# Patient Record
Sex: FEMALE | Race: WHITE | Employment: OTHER | URBAN - METROPOLITAN AREA
[De-identification: names, ages, dates, MRNs, and addresses within clinical notes are randomized per-mention and may not be internally consistent; named-entity substitution may affect disease eponyms.]

---

## 2017-01-01 ENCOUNTER — RESULTS ONLY (OUTPATIENT)
Dept: LAB | Facility: CLINIC | Age: 82
End: 2017-01-01

## 2017-01-01 ENCOUNTER — INFUSION THERAPY VISIT (OUTPATIENT)
Dept: INFUSION THERAPY | Facility: CLINIC | Age: 82
End: 2017-01-01
Attending: INTERNAL MEDICINE
Payer: COMMERCIAL

## 2017-01-01 ENCOUNTER — TRANSFERRED RECORDS (OUTPATIENT)
Dept: HEALTH INFORMATION MANAGEMENT | Facility: CLINIC | Age: 82
End: 2017-01-01

## 2017-01-01 ENCOUNTER — HOSPITAL ENCOUNTER (OUTPATIENT)
Dept: LAB | Facility: CLINIC | Age: 82
Discharge: HOME | End: 2017-09-05
Attending: INTERNAL MEDICINE | Admitting: INTERNAL MEDICINE
Payer: COMMERCIAL

## 2017-01-01 ENCOUNTER — HOSPITAL ENCOUNTER (OUTPATIENT)
Dept: LAB | Facility: CLINIC | Age: 82
Discharge: HOME | End: 2017-12-11
Attending: INTERNAL MEDICINE | Admitting: INTERNAL MEDICINE
Payer: COMMERCIAL

## 2017-01-01 ENCOUNTER — HOSPITAL ENCOUNTER (OUTPATIENT)
Facility: CLINIC | Age: 82
Discharge: HOME | End: 2017-11-27
Attending: INTERNAL MEDICINE | Admitting: INTERNAL MEDICINE
Payer: COMMERCIAL

## 2017-01-01 ENCOUNTER — HOSPITAL ENCOUNTER (OUTPATIENT)
Dept: LAB | Facility: CLINIC | Age: 82
Discharge: HOME | End: 2017-04-03
Attending: INTERNAL MEDICINE | Admitting: INTERNAL MEDICINE
Payer: COMMERCIAL

## 2017-01-01 ENCOUNTER — HOSPITAL ENCOUNTER (OUTPATIENT)
Facility: CLINIC | Age: 82
Setting detail: SPECIMEN
Discharge: HOME | End: 2017-11-03
Attending: INTERNAL MEDICINE | Admitting: INTERNAL MEDICINE
Payer: COMMERCIAL

## 2017-01-01 ENCOUNTER — HOSPITAL ENCOUNTER (OUTPATIENT)
Dept: LAB | Facility: CLINIC | Age: 82
Discharge: HOME | End: 2017-08-21
Attending: INTERNAL MEDICINE | Admitting: INTERNAL MEDICINE
Payer: COMMERCIAL

## 2017-01-01 ENCOUNTER — HOSPITAL ENCOUNTER (OUTPATIENT)
Dept: LAB | Facility: CLINIC | Age: 82
Discharge: HOME | End: 2017-07-10
Attending: INTERNAL MEDICINE | Admitting: INTERNAL MEDICINE
Payer: COMMERCIAL

## 2017-01-01 ENCOUNTER — MEDICAL CORRESPONDENCE (OUTPATIENT)
Dept: HEALTH INFORMATION MANAGEMENT | Facility: CLINIC | Age: 82
End: 2017-01-01

## 2017-01-01 ENCOUNTER — HOSPITAL ENCOUNTER (OUTPATIENT)
Dept: LAB | Facility: CLINIC | Age: 82
Discharge: HOME | End: 2017-05-30
Attending: INTERNAL MEDICINE | Admitting: INTERNAL MEDICINE
Payer: COMMERCIAL

## 2017-01-01 ENCOUNTER — HOSPITAL ENCOUNTER (OUTPATIENT)
Dept: LAB | Facility: CLINIC | Age: 82
Discharge: HOME | End: 2017-10-02
Attending: INTERNAL MEDICINE | Admitting: INTERNAL MEDICINE
Payer: COMMERCIAL

## 2017-01-01 ENCOUNTER — HOSPITAL ENCOUNTER (OUTPATIENT)
Dept: LAB | Facility: CLINIC | Age: 82
Discharge: HOME | End: 2017-03-20
Attending: INTERNAL MEDICINE | Admitting: INTERNAL MEDICINE
Payer: COMMERCIAL

## 2017-01-01 ENCOUNTER — HOSPITAL ENCOUNTER (OUTPATIENT)
Dept: LAB | Facility: CLINIC | Age: 82
Discharge: HOME | End: 2017-05-15
Attending: INTERNAL MEDICINE | Admitting: INTERNAL MEDICINE
Payer: COMMERCIAL

## 2017-01-01 ENCOUNTER — HOSPITAL ENCOUNTER (OUTPATIENT)
Dept: LAB | Facility: CLINIC | Age: 82
Discharge: HOME | End: 2017-10-18
Attending: INTERNAL MEDICINE | Admitting: INTERNAL MEDICINE
Payer: COMMERCIAL

## 2017-01-01 ENCOUNTER — HOSPITAL ENCOUNTER (OUTPATIENT)
Dept: LAB | Facility: CLINIC | Age: 82
Discharge: HOME | End: 2017-11-27
Attending: INTERNAL MEDICINE | Admitting: INTERNAL MEDICINE
Payer: COMMERCIAL

## 2017-01-01 ENCOUNTER — HOSPITAL ENCOUNTER (OUTPATIENT)
Dept: LAB | Facility: CLINIC | Age: 82
Discharge: HOME | End: 2017-12-27
Attending: INTERNAL MEDICINE | Admitting: INTERNAL MEDICINE
Payer: COMMERCIAL

## 2017-01-01 ENCOUNTER — HOSPITAL ENCOUNTER (OUTPATIENT)
Dept: LAB | Facility: CLINIC | Age: 82
Discharge: HOME | End: 2017-05-01
Attending: INTERNAL MEDICINE | Admitting: INTERNAL MEDICINE
Payer: COMMERCIAL

## 2017-01-01 ENCOUNTER — HOSPITAL ENCOUNTER (OUTPATIENT)
Dept: LAB | Facility: CLINIC | Age: 82
Discharge: HOME | End: 2017-09-18
Attending: INTERNAL MEDICINE | Admitting: INTERNAL MEDICINE
Payer: COMMERCIAL

## 2017-01-01 ENCOUNTER — HOSPITAL ENCOUNTER (OUTPATIENT)
Dept: LAB | Facility: CLINIC | Age: 82
Discharge: HOME | End: 2017-08-07
Attending: INTERNAL MEDICINE | Admitting: INTERNAL MEDICINE
Payer: COMMERCIAL

## 2017-01-01 ENCOUNTER — HOSPITAL ENCOUNTER (OUTPATIENT)
Dept: LAB | Facility: CLINIC | Age: 82
Discharge: HOME | End: 2017-06-12
Attending: FAMILY MEDICINE | Admitting: INTERNAL MEDICINE
Payer: COMMERCIAL

## 2017-01-01 ENCOUNTER — HOSPITAL ENCOUNTER (OUTPATIENT)
Dept: LAB | Facility: CLINIC | Age: 82
Discharge: HOME | End: 2017-07-24
Attending: INTERNAL MEDICINE | Admitting: INTERNAL MEDICINE
Payer: COMMERCIAL

## 2017-01-01 ENCOUNTER — HOSPITAL ENCOUNTER (OUTPATIENT)
Dept: LAB | Facility: CLINIC | Age: 82
Discharge: HOME | End: 2017-06-26
Attending: INTERNAL MEDICINE | Admitting: INTERNAL MEDICINE
Payer: COMMERCIAL

## 2017-01-01 VITALS
OXYGEN SATURATION: 95 % | SYSTOLIC BLOOD PRESSURE: 133 MMHG | DIASTOLIC BLOOD PRESSURE: 60 MMHG | RESPIRATION RATE: 18 BRPM | TEMPERATURE: 97.7 F | HEART RATE: 92 BPM

## 2017-01-01 VITALS
RESPIRATION RATE: 18 BRPM | SYSTOLIC BLOOD PRESSURE: 136 MMHG | DIASTOLIC BLOOD PRESSURE: 74 MMHG | OXYGEN SATURATION: 96 % | TEMPERATURE: 98.1 F | HEART RATE: 90 BPM

## 2017-01-01 VITALS
SYSTOLIC BLOOD PRESSURE: 115 MMHG | DIASTOLIC BLOOD PRESSURE: 51 MMHG | TEMPERATURE: 97.8 F | OXYGEN SATURATION: 100 % | RESPIRATION RATE: 16 BRPM | HEART RATE: 80 BPM

## 2017-01-01 VITALS
SYSTOLIC BLOOD PRESSURE: 165 MMHG | TEMPERATURE: 97.5 F | DIASTOLIC BLOOD PRESSURE: 90 MMHG | RESPIRATION RATE: 16 BRPM | OXYGEN SATURATION: 97 % | HEART RATE: 92 BPM

## 2017-01-01 VITALS
DIASTOLIC BLOOD PRESSURE: 67 MMHG | OXYGEN SATURATION: 98 % | HEART RATE: 90 BPM | SYSTOLIC BLOOD PRESSURE: 127 MMHG | RESPIRATION RATE: 16 BRPM | TEMPERATURE: 97.6 F

## 2017-01-01 VITALS
RESPIRATION RATE: 16 BRPM | TEMPERATURE: 97.7 F | HEART RATE: 86 BPM | DIASTOLIC BLOOD PRESSURE: 55 MMHG | SYSTOLIC BLOOD PRESSURE: 128 MMHG

## 2017-01-01 VITALS
HEART RATE: 92 BPM | OXYGEN SATURATION: 95 % | SYSTOLIC BLOOD PRESSURE: 145 MMHG | RESPIRATION RATE: 16 BRPM | TEMPERATURE: 97.6 F | DIASTOLIC BLOOD PRESSURE: 76 MMHG

## 2017-01-01 VITALS
TEMPERATURE: 98.6 F | SYSTOLIC BLOOD PRESSURE: 171 MMHG | OXYGEN SATURATION: 93 % | BODY MASS INDEX: 27.88 KG/M2 | HEART RATE: 87 BPM | HEIGHT: 59 IN | RESPIRATION RATE: 16 BRPM | DIASTOLIC BLOOD PRESSURE: 60 MMHG | WEIGHT: 138.3 LBS

## 2017-01-01 VITALS
SYSTOLIC BLOOD PRESSURE: 118 MMHG | RESPIRATION RATE: 16 BRPM | TEMPERATURE: 97.7 F | OXYGEN SATURATION: 98 % | DIASTOLIC BLOOD PRESSURE: 56 MMHG | HEART RATE: 88 BPM

## 2017-01-01 VITALS
OXYGEN SATURATION: 92 % | HEART RATE: 92 BPM | DIASTOLIC BLOOD PRESSURE: 54 MMHG | RESPIRATION RATE: 18 BRPM | TEMPERATURE: 97.6 F | SYSTOLIC BLOOD PRESSURE: 110 MMHG

## 2017-01-01 VITALS
RESPIRATION RATE: 16 BRPM | DIASTOLIC BLOOD PRESSURE: 67 MMHG | TEMPERATURE: 98.2 F | OXYGEN SATURATION: 97 % | HEART RATE: 96 BPM | SYSTOLIC BLOOD PRESSURE: 131 MMHG

## 2017-01-01 VITALS
RESPIRATION RATE: 16 BRPM | TEMPERATURE: 97.8 F | DIASTOLIC BLOOD PRESSURE: 63 MMHG | HEART RATE: 99 BPM | SYSTOLIC BLOOD PRESSURE: 152 MMHG | OXYGEN SATURATION: 99 %

## 2017-01-01 VITALS
HEART RATE: 100 BPM | TEMPERATURE: 97.4 F | SYSTOLIC BLOOD PRESSURE: 157 MMHG | DIASTOLIC BLOOD PRESSURE: 75 MMHG | OXYGEN SATURATION: 99 % | RESPIRATION RATE: 16 BRPM

## 2017-01-01 VITALS
DIASTOLIC BLOOD PRESSURE: 58 MMHG | RESPIRATION RATE: 18 BRPM | TEMPERATURE: 97.4 F | SYSTOLIC BLOOD PRESSURE: 133 MMHG | HEART RATE: 86 BPM | OXYGEN SATURATION: 95 %

## 2017-01-01 VITALS
TEMPERATURE: 98.2 F | HEART RATE: 85 BPM | DIASTOLIC BLOOD PRESSURE: 61 MMHG | SYSTOLIC BLOOD PRESSURE: 123 MMHG | RESPIRATION RATE: 20 BRPM

## 2017-01-01 VITALS
DIASTOLIC BLOOD PRESSURE: 69 MMHG | TEMPERATURE: 98 F | OXYGEN SATURATION: 93 % | SYSTOLIC BLOOD PRESSURE: 145 MMHG | HEART RATE: 91 BPM

## 2017-01-01 VITALS
RESPIRATION RATE: 16 BRPM | OXYGEN SATURATION: 95 % | DIASTOLIC BLOOD PRESSURE: 82 MMHG | SYSTOLIC BLOOD PRESSURE: 131 MMHG | HEART RATE: 90 BPM | TEMPERATURE: 97.5 F

## 2017-01-01 VITALS — SYSTOLIC BLOOD PRESSURE: 124 MMHG | TEMPERATURE: 97.7 F | DIASTOLIC BLOOD PRESSURE: 65 MMHG | HEART RATE: 95 BPM

## 2017-01-01 VITALS
TEMPERATURE: 98.5 F | RESPIRATION RATE: 18 BRPM | DIASTOLIC BLOOD PRESSURE: 73 MMHG | SYSTOLIC BLOOD PRESSURE: 123 MMHG | HEART RATE: 96 BPM | OXYGEN SATURATION: 97 %

## 2017-01-01 DIAGNOSIS — D64.9 ANEMIA: ICD-10-CM

## 2017-01-01 DIAGNOSIS — D46.9 MDS (MYELODYSPLASTIC SYNDROME) (H): ICD-10-CM

## 2017-01-01 DIAGNOSIS — D46.9 MDS (MYELODYSPLASTIC SYNDROME) (H): Primary | ICD-10-CM

## 2017-01-01 LAB
ABO + RH BLD: NORMAL
BLD GP AB SCN SERPL QL: NORMAL
BLD PROD TYP BPU: NORMAL
BLD UNIT ID BPU: 0
BLOOD BANK CMNT PATIENT-IMP: NORMAL
BLOOD PRODUCT CODE: NORMAL
BPU ID: NORMAL
NUM BPU REQUESTED: 1
NUM BPU REQUESTED: 2
SPECIMEN EXP DATE BLD: NORMAL
TRANSFUSION STATUS PATIENT QL: NORMAL

## 2017-01-01 PROCEDURE — 96366 THER/PROPH/DIAG IV INF ADDON: CPT

## 2017-01-01 PROCEDURE — 25000128 H RX IP 250 OP 636: Performed by: INTERNAL MEDICINE

## 2017-01-01 PROCEDURE — 96365 THER/PROPH/DIAG IV INF INIT: CPT

## 2017-01-01 PROCEDURE — 86923 COMPATIBILITY TEST ELECTRIC: CPT | Performed by: INTERNAL MEDICINE

## 2017-01-01 PROCEDURE — P9016 RBC LEUKOCYTES REDUCED: HCPCS | Performed by: INTERNAL MEDICINE

## 2017-01-01 PROCEDURE — 86901 BLOOD TYPING SEROLOGIC RH(D): CPT | Performed by: INTERNAL MEDICINE

## 2017-01-01 PROCEDURE — 86850 RBC ANTIBODY SCREEN: CPT | Performed by: INTERNAL MEDICINE

## 2017-01-01 PROCEDURE — 36430 TRANSFUSION BLD/BLD COMPNT: CPT

## 2017-01-01 PROCEDURE — 86900 BLOOD TYPING SEROLOGIC ABO: CPT | Performed by: INTERNAL MEDICINE

## 2017-01-01 PROCEDURE — 36415 COLL VENOUS BLD VENIPUNCTURE: CPT | Performed by: INTERNAL MEDICINE

## 2017-01-01 PROCEDURE — 40000935 ZZH STATISTIC OUTPATIENT (NON-OBS) EVE

## 2017-01-01 PROCEDURE — 25000125 ZZHC RX 250: Performed by: INTERNAL MEDICINE

## 2017-01-01 PROCEDURE — 40000936 ZZH STATISTIC OUTPATIENT (NON-OBS) NIGHT

## 2017-01-01 PROCEDURE — 40000141 ZZH STATISTIC PERIPHERAL IV START W/O US GUIDANCE

## 2017-01-01 RX ADMIN — SODIUM CHLORIDE 1000 MG: 0.9 INJECTION, SOLUTION INTRAVENOUS at 12:49

## 2017-01-01 RX ADMIN — SODIUM CHLORIDE 1000 MG: 0.9 INJECTION, SOLUTION INTRAVENOUS at 13:12

## 2017-01-01 RX ADMIN — SODIUM CHLORIDE 2000 MG: 0.9 INJECTION, SOLUTION INTRAVENOUS at 12:29

## 2017-01-01 RX ADMIN — SODIUM CHLORIDE 2000 MG: 0.9 INJECTION, SOLUTION INTRAVENOUS at 09:20

## 2017-01-01 RX ADMIN — SODIUM CHLORIDE 1000 MG: 0.9 INJECTION, SOLUTION INTRAVENOUS at 08:32

## 2017-01-01 RX ADMIN — SODIUM CHLORIDE 1000 MG: 0.9 INJECTION, SOLUTION INTRAVENOUS at 10:53

## 2017-01-01 RX ADMIN — SODIUM CHLORIDE 2000 MG: 0.9 INJECTION, SOLUTION INTRAVENOUS at 11:29

## 2017-01-01 RX ADMIN — SODIUM CHLORIDE 2000 MG: 0.9 INJECTION, SOLUTION INTRAVENOUS at 09:09

## 2017-01-01 RX ADMIN — DEFEROXAMINE MESYLATE 1000 MG: 500 INJECTION, POWDER, LYOPHILIZED, FOR SOLUTION INTRAMUSCULAR; INTRAVENOUS; SUBCUTANEOUS at 18:22

## 2017-01-01 RX ADMIN — DEFEROXAMINE MESYLATE 1000 MG: 500 INJECTION, POWDER, LYOPHILIZED, FOR SOLUTION INTRAMUSCULAR; INTRAVENOUS; SUBCUTANEOUS at 09:36

## 2017-01-01 RX ADMIN — DEFEROXAMINE MESYLATE 1000 MG: 500 INJECTION, POWDER, LYOPHILIZED, FOR SOLUTION INTRAMUSCULAR; INTRAVENOUS; SUBCUTANEOUS at 12:37

## 2017-01-01 RX ADMIN — SODIUM CHLORIDE 1000 MG: 0.9 INJECTION, SOLUTION INTRAVENOUS at 13:07

## 2017-01-01 RX ADMIN — DEFEROXAMINE MESYLATE 1000 MG: 500 INJECTION, POWDER, LYOPHILIZED, FOR SOLUTION INTRAMUSCULAR; INTRAVENOUS; SUBCUTANEOUS at 12:00

## 2017-01-01 RX ADMIN — SODIUM CHLORIDE 1000 MG: 0.9 INJECTION, SOLUTION INTRAVENOUS at 10:16

## 2017-01-01 RX ADMIN — DEFEROXAMINE MESYLATE 2000 MG: 2 INJECTION, POWDER, LYOPHILIZED, FOR SOLUTION INTRAMUSCULAR; INTRAVENOUS; SUBCUTANEOUS at 12:33

## 2017-01-01 RX ADMIN — SODIUM CHLORIDE 2000 MG: 0.9 INJECTION, SOLUTION INTRAVENOUS at 11:07

## 2017-01-01 RX ADMIN — DEFEROXAMINE MESYLATE 2000 MG: 2 INJECTION, POWDER, LYOPHILIZED, FOR SOLUTION INTRAMUSCULAR; INTRAVENOUS; SUBCUTANEOUS at 11:45

## 2017-01-01 RX ADMIN — SODIUM CHLORIDE 2000 MG: 0.9 INJECTION, SOLUTION INTRAVENOUS at 12:51

## 2017-01-01 RX ADMIN — DEFEROXAMINE MESYLATE 1000 MG: 500 INJECTION, POWDER, LYOPHILIZED, FOR SOLUTION INTRAMUSCULAR; INTRAVENOUS; SUBCUTANEOUS at 08:30

## 2017-01-01 RX ADMIN — SODIUM CHLORIDE 2000 MG: 0.9 INJECTION, SOLUTION INTRAVENOUS at 09:29

## 2017-01-18 ENCOUNTER — HOSPITAL ENCOUNTER (OUTPATIENT)
Dept: LAB | Facility: CLINIC | Age: 82
Discharge: HOME | End: 2017-01-18
Attending: INTERNAL MEDICINE | Admitting: INTERNAL MEDICINE
Payer: COMMERCIAL

## 2017-01-18 ENCOUNTER — TRANSFERRED RECORDS (OUTPATIENT)
Dept: HEALTH INFORMATION MANAGEMENT | Facility: CLINIC | Age: 82
End: 2017-01-18

## 2017-01-18 DIAGNOSIS — D46.9 MDS (MYELODYSPLASTIC SYNDROME) (H): ICD-10-CM

## 2017-01-18 LAB
ABO + RH BLD: NORMAL
ABO + RH BLD: NORMAL
BLD GP AB SCN SERPL QL: NORMAL
BLOOD BANK CMNT PATIENT-IMP: NORMAL
NUM BPU REQUESTED: 2
SPECIMEN EXP DATE BLD: NORMAL

## 2017-01-18 PROCEDURE — 86923 COMPATIBILITY TEST ELECTRIC: CPT | Performed by: INTERNAL MEDICINE

## 2017-01-18 PROCEDURE — 36415 COLL VENOUS BLD VENIPUNCTURE: CPT | Performed by: INTERNAL MEDICINE

## 2017-01-18 PROCEDURE — 86850 RBC ANTIBODY SCREEN: CPT | Performed by: INTERNAL MEDICINE

## 2017-01-18 PROCEDURE — 86900 BLOOD TYPING SEROLOGIC ABO: CPT | Performed by: INTERNAL MEDICINE

## 2017-01-18 PROCEDURE — 86901 BLOOD TYPING SEROLOGIC RH(D): CPT | Performed by: INTERNAL MEDICINE

## 2017-01-20 ENCOUNTER — INFUSION THERAPY VISIT (OUTPATIENT)
Dept: INFUSION THERAPY | Facility: CLINIC | Age: 82
End: 2017-01-20
Attending: INTERNAL MEDICINE
Payer: COMMERCIAL

## 2017-01-20 VITALS
DIASTOLIC BLOOD PRESSURE: 85 MMHG | OXYGEN SATURATION: 95 % | SYSTOLIC BLOOD PRESSURE: 162 MMHG | HEART RATE: 96 BPM | RESPIRATION RATE: 16 BRPM | TEMPERATURE: 98.2 F

## 2017-01-20 DIAGNOSIS — D64.9 ANEMIA: ICD-10-CM

## 2017-01-20 DIAGNOSIS — D46.9 MDS (MYELODYSPLASTIC SYNDROME) (H): Primary | ICD-10-CM

## 2017-01-20 LAB
BLD PROD TYP BPU: NORMAL
BLD PROD TYP BPU: NORMAL
BLD UNIT ID BPU: 0
BLD UNIT ID BPU: 0
BLOOD PRODUCT CODE: NORMAL
BLOOD PRODUCT CODE: NORMAL
BPU ID: NORMAL
BPU ID: NORMAL
TRANSFUSION STATUS PATIENT QL: NORMAL

## 2017-01-20 PROCEDURE — 25000128 H RX IP 250 OP 636: Performed by: INTERNAL MEDICINE

## 2017-01-20 PROCEDURE — 25000125 ZZHC RX 250: Performed by: INTERNAL MEDICINE

## 2017-01-20 PROCEDURE — 36430 TRANSFUSION BLD/BLD COMPNT: CPT

## 2017-01-20 PROCEDURE — P9016 RBC LEUKOCYTES REDUCED: HCPCS | Performed by: INTERNAL MEDICINE

## 2017-01-20 PROCEDURE — 96366 THER/PROPH/DIAG IV INF ADDON: CPT

## 2017-01-20 PROCEDURE — 96365 THER/PROPH/DIAG IV INF INIT: CPT

## 2017-01-20 RX ADMIN — DEFEROXAMINE MESYLATE 1000 MG: 500 INJECTION, POWDER, LYOPHILIZED, FOR SOLUTION INTRAMUSCULAR; INTRAVENOUS; SUBCUTANEOUS at 11:16

## 2017-01-20 NOTE — PROGRESS NOTES
Infusion Nursing Note:  Josy Thomson presents today for 2 units of PRBC's and Desferal.    Patient seen by provider today: No   present during visit today: Not Applicable.    Note: Patient feeling well today.    Intravenous Access:  Peripheral IV placed- 2 placed, one line for the blood and one line for the Desferal.    Treatment Conditions:  Blood transfusion consent signed 10/14/16.  Hemoglobin 3.9.      Post Infusion Assessment:  Patient tolerated transfusion without incident.  Blood return noted pre and post infusion.  Site patent and intact, free from redness, edema or discomfort.  No evidence of extravasations.  Access discontinued per protocol.    Discharge Plan:   Discharge instructions reviewed with: Patient and daughter.  Patient and/or family verbalized understanding of discharge instructions and all questions answered.  Patient discharged in stable condition accompanied by: daughter.  Departure Mode: Wheelchair.    Yanni Masterson RN

## 2017-02-01 ENCOUNTER — TRANSFERRED RECORDS (OUTPATIENT)
Dept: HEALTH INFORMATION MANAGEMENT | Facility: CLINIC | Age: 82
End: 2017-02-01

## 2017-02-01 ENCOUNTER — HOSPITAL ENCOUNTER (OUTPATIENT)
Dept: LAB | Facility: CLINIC | Age: 82
Discharge: HOME | End: 2017-02-01
Attending: INTERNAL MEDICINE | Admitting: INTERNAL MEDICINE
Payer: COMMERCIAL

## 2017-02-01 ENCOUNTER — MEDICAL CORRESPONDENCE (OUTPATIENT)
Dept: HEALTH INFORMATION MANAGEMENT | Facility: CLINIC | Age: 82
End: 2017-02-01

## 2017-02-01 DIAGNOSIS — D46.9 MDS (MYELODYSPLASTIC SYNDROME) (H): ICD-10-CM

## 2017-02-01 DIAGNOSIS — D46.9 MDS (MYELODYSPLASTIC SYNDROME) (H): Primary | ICD-10-CM

## 2017-02-01 LAB
ABO + RH BLD: NORMAL
ABO + RH BLD: NORMAL
BLD GP AB SCN SERPL QL: NORMAL
BLD PROD TYP BPU: NORMAL
BLOOD BANK CMNT PATIENT-IMP: NORMAL
NUM BPU REQUESTED: 2
SPECIMEN EXP DATE BLD: NORMAL

## 2017-02-01 PROCEDURE — 86850 RBC ANTIBODY SCREEN: CPT | Performed by: INTERNAL MEDICINE

## 2017-02-01 PROCEDURE — 36415 COLL VENOUS BLD VENIPUNCTURE: CPT | Performed by: INTERNAL MEDICINE

## 2017-02-01 PROCEDURE — 86923 COMPATIBILITY TEST ELECTRIC: CPT | Performed by: INTERNAL MEDICINE

## 2017-02-01 PROCEDURE — 86901 BLOOD TYPING SEROLOGIC RH(D): CPT | Performed by: INTERNAL MEDICINE

## 2017-02-01 PROCEDURE — 86900 BLOOD TYPING SEROLOGIC ABO: CPT | Performed by: INTERNAL MEDICINE

## 2017-02-03 ENCOUNTER — INFUSION THERAPY VISIT (OUTPATIENT)
Dept: INFUSION THERAPY | Facility: CLINIC | Age: 82
End: 2017-02-03
Attending: PEDIATRICS
Payer: COMMERCIAL

## 2017-02-03 VITALS
OXYGEN SATURATION: 98 % | RESPIRATION RATE: 18 BRPM | HEART RATE: 93 BPM | SYSTOLIC BLOOD PRESSURE: 115 MMHG | TEMPERATURE: 97.9 F | DIASTOLIC BLOOD PRESSURE: 47 MMHG

## 2017-02-03 DIAGNOSIS — D46.9 MDS (MYELODYSPLASTIC SYNDROME) (H): Primary | ICD-10-CM

## 2017-02-03 DIAGNOSIS — D64.9 ANEMIA: ICD-10-CM

## 2017-02-03 PROCEDURE — 96365 THER/PROPH/DIAG IV INF INIT: CPT

## 2017-02-03 PROCEDURE — P9016 RBC LEUKOCYTES REDUCED: HCPCS | Performed by: INTERNAL MEDICINE

## 2017-02-03 PROCEDURE — 96366 THER/PROPH/DIAG IV INF ADDON: CPT

## 2017-02-03 PROCEDURE — 25000128 H RX IP 250 OP 636: Performed by: INTERNAL MEDICINE

## 2017-02-03 PROCEDURE — 36430 TRANSFUSION BLD/BLD COMPNT: CPT

## 2017-02-03 RX ADMIN — DEFEROXAMINE MESYLATE 1000 MG: 500 INJECTION, POWDER, LYOPHILIZED, FOR SOLUTION INTRAMUSCULAR; INTRAVENOUS; SUBCUTANEOUS at 08:41

## 2017-02-03 NOTE — PROGRESS NOTES
Infusion Nursing Note:  Josy Thomson presents today for 2 unit's PRBC's with Desferal.    Patient seen by provider today: No   present during visit today: Not Applicable.    Note: N/A.    Intravenous Access:  Peripheral IV placed x's 2.    Treatment Conditions:  HGB    4.7   Blood transfusion consent signed 10/14/2016.      Post Infusion Assessment:  Patient tolerated infusion without incident.  Blood return noted pre and post infusion.  Site patent and intact, free from redness, edema or discomfort.  No evidence of extravasations.  Access discontinued per protocol.    Discharge Plan:   Discharge instructions reviewed with: Patient and Family.  Patient and/or family verbalized understanding of discharge instructions and all questions answered.  AVS to patient via [employer].  Patient will return as needed for next appointment.   Patient discharged in stable condition accompanied by: daughter.  Departure Mode: Wheelchair.    Mili Dickerson RN

## 2017-02-27 ENCOUNTER — TRANSFERRED RECORDS (OUTPATIENT)
Dept: HEALTH INFORMATION MANAGEMENT | Facility: CLINIC | Age: 82
End: 2017-02-27

## 2017-03-01 ENCOUNTER — HOSPITAL ENCOUNTER (OUTPATIENT)
Dept: LAB | Facility: CLINIC | Age: 82
Discharge: HOME | End: 2017-03-01
Attending: INTERNAL MEDICINE | Admitting: INTERNAL MEDICINE
Payer: COMMERCIAL

## 2017-03-01 DIAGNOSIS — D46.9 MDS (MYELODYSPLASTIC SYNDROME) (H): ICD-10-CM

## 2017-03-01 PROCEDURE — 36415 COLL VENOUS BLD VENIPUNCTURE: CPT | Performed by: INTERNAL MEDICINE

## 2017-03-01 PROCEDURE — 86901 BLOOD TYPING SEROLOGIC RH(D): CPT | Performed by: INTERNAL MEDICINE

## 2017-03-01 PROCEDURE — 86923 COMPATIBILITY TEST ELECTRIC: CPT | Performed by: INTERNAL MEDICINE

## 2017-03-01 PROCEDURE — 86850 RBC ANTIBODY SCREEN: CPT | Performed by: INTERNAL MEDICINE

## 2017-03-01 PROCEDURE — 86900 BLOOD TYPING SEROLOGIC ABO: CPT | Performed by: INTERNAL MEDICINE

## 2017-03-03 ENCOUNTER — INFUSION THERAPY VISIT (OUTPATIENT)
Dept: INFUSION THERAPY | Facility: CLINIC | Age: 82
End: 2017-03-03
Attending: INTERNAL MEDICINE
Payer: COMMERCIAL

## 2017-03-03 VITALS
OXYGEN SATURATION: 98 % | HEART RATE: 102 BPM | TEMPERATURE: 98.2 F | RESPIRATION RATE: 18 BRPM | SYSTOLIC BLOOD PRESSURE: 147 MMHG | DIASTOLIC BLOOD PRESSURE: 68 MMHG

## 2017-03-03 DIAGNOSIS — D64.9 ANEMIA: ICD-10-CM

## 2017-03-03 DIAGNOSIS — D46.9 MDS (MYELODYSPLASTIC SYNDROME) (H): Primary | ICD-10-CM

## 2017-03-03 PROCEDURE — 96365 THER/PROPH/DIAG IV INF INIT: CPT

## 2017-03-03 PROCEDURE — P9016 RBC LEUKOCYTES REDUCED: HCPCS | Performed by: INTERNAL MEDICINE

## 2017-03-03 PROCEDURE — 25000128 H RX IP 250 OP 636: Performed by: INTERNAL MEDICINE

## 2017-03-03 PROCEDURE — 36430 TRANSFUSION BLD/BLD COMPNT: CPT

## 2017-03-03 PROCEDURE — 96366 THER/PROPH/DIAG IV INF ADDON: CPT

## 2017-03-03 RX ADMIN — DEFEROXAMINE MESYLATE 1000 MG: 500 INJECTION, POWDER, LYOPHILIZED, FOR SOLUTION INTRAMUSCULAR; INTRAVENOUS; SUBCUTANEOUS at 08:26

## 2017-03-03 NOTE — PROGRESS NOTES
Infusion Nursing Note:  Josy Thomson presents today for 2 units of PRBC's and Desferal.    Patient seen by provider today: No   present during visit today: Not Applicable.    Note: N/A.    Intravenous Access:  Peripheral IV placed- 2 placed, one line for the blood and one line for the Desferal.    Treatment Conditions:  Blood transfusion consent signed 10/14/16.      Post Infusion Assessment:  Patient tolerated transfusion without incident.  Blood return noted pre and post infusion.  Site patent and intact, free from redness, edema or discomfort.  No evidence of extravasations.  Access discontinued per protocol.    Discharge Plan:   Discharge instructions reviewed with: Patient.  Patient and/or family verbalized understanding of discharge instructions and all questions answered.  Patient discharged in stable condition accompanied by: daughter.  Departure Mode: Wheelchair.    Yanni Masterson RN

## 2017-03-03 NOTE — MR AVS SNAPSHOT
"              After Visit Summary   3/3/2017    Josy Thomson    MRN: [id number]           Patient Information     Date Of Birth          12/12/1927        Visit Information        Provider Department      3/3/2017 8:00 AM RH INFUSION CHAIR 3 [facility] Infusion Services        Today's Diagnoses     MDS (myelodysplastic syndrome) (H)    -  1    Anemia           Follow-ups after your visit        Future tests that were ordered for you today     Open Standing Orders        Priority Remaining Interval Expires Ordered    Red blood cell prepare order unit Routine 99/100 CONDITIONAL (SPECIFY) BLOOD  2/28/2017            Who to contact     If you have questions or need follow up information about today's clinic visit or your schedule please contact [facility] INFUSION SERVICES directly at [phone].  Normal or non-critical lab and imaging results will be communicated to you by SumZerohart, letter or phone within 4 business days after the clinic has received the results. If you do not hear from us within 7 days, please contact the clinic through PublikDemandt or phone. If you have a critical or abnormal lab result, we will notify you by phone as soon as possible.  Submit refill requests through Yushino or call your pharmacy and they will forward the refill request to us. Please allow 3 business days for your refill to be completed.          Additional Information About Your Visit        SumZerohart Information     Yushino lets you send messages to your doctor, view your test results, renew your prescriptions, schedule appointments and more. To sign up, go to [email or web address] . Click on \"Log in\" on the left side of the screen, which will take you to the Welcome page. Then click on \"Sign up Now\" on the right side of the page.     You will be asked to enter the access code listed below, as well as some personal information. Please follow the directions to create your username and password.   "   Your access code is: [email or web address]  Expires: 2017  8:34 AM     Your access code will  in 90 days. If you need help or a new code, please call your [place] clinic or [phone].        Care EveryWhere ID     This is your Care EveryWhere ID. This could be used by other organizations to access your [place] medical records  [id number]        Your Vitals Were     Pulse Temperature Respirations Pulse Oximetry          102 98.2  F (36.8  C) (Tympanic) 18 98%         Blood Pressure from Last 3 Encounters:   17 147/68   17 115/47   17 162/85    Weight from Last 3 Encounters:   11/17/15 64.9 kg (143 lb)   10/12/15 63.5 kg (140 lb)   10/27/12 77.1 kg (170 lb)              We Performed the Following     Obtain affirmation of informed consent     Red blood cell prepare order unit     Transfuse red blood cell unit     Transfuse red blood cell unit     Treatment Conditions        Primary Care Provider Office Phone # Fax #    Abeba Bradford [phone]       [facility] [address] [place] [address]        Thank you!     Thank you for choosing [facility] INFUSION SERVICES  for your care. Our goal is always to provide you with excellent care. Hearing back from our patients is one way we can continue to improve our services. Please take a few minutes to complete the written survey that you may receive in the mail after your visit with us. Thank you!             Your Updated Medication List - Protect others around you: Learn how to safely use, store and throw away your medicines at www.disposemymeds.org.          This list is accurate as of: 3/3/17 12:02 PM.  Always use your most recent med list.                   Brand Name Dispense Instructions for use    acetaminophen 325 MG tablet    TYLENOL    250 tablet    Take 2 tablets by mouth every 4 hours as needed.       B COMPLEX 1 PO      Take by mouth daily       calcium carb 1250 mg (500 mg  jourdan)/vitamin D 200 units 500-200 MG-UNIT per tablet    OSCAL with D     Take 1 tablet by mouth 2 times daily (with meals).       cyanocobalamin 1000 MCG/ML injection    VITAMIN B12     Inject 1 mL into the muscle every 30 days       DARBEPOETIN KEVIN-POLYSORBATE IJ          EFFEXOR XR 75 MG 24 hr capsule   Generic drug:  venlafaxine      Take 75 mg by mouth daily.       EXJADE PO      Take 1,500 mg by mouth every morning (before breakfast)       HYDROMORPHONE HCL PO          losartan-hydrochlorothiazide 100-25 MG per tablet    HYZAAR     Take 1 tablet by mouth daily.       metoprolol 100 MG 24 hr tablet    TOPROL-XL     Take 100 mg by mouth daily.       omeprazole 20 MG CR capsule    priLOSEC     Take 40 mg by mouth daily       VITAMIN MIXTURE PO      Take 2 tablets by mouth daily Occuvite for eye health

## 2017-03-22 NOTE — MR AVS SNAPSHOT
"              After Visit Summary   3/22/2017    Josy Thomson    MRN: [id number]           Patient Information     Date Of Birth          12/12/1927        Visit Information        Provider Department      3/22/2017 9:00 AM RH INFUSION CHAIR 7 [facility] Infusion Services        Today's Diagnoses     MDS (myelodysplastic syndrome) (H)    -  1       Follow-ups after your visit        Future tests that were ordered for you today     Open Standing Orders        Priority Remaining Interval Expires Ordered    Red blood cell prepare order unit Routine 99/100 CONDITIONAL (SPECIFY) BLOOD  3/20/2017            Who to contact     If you have questions or need follow up information about today's clinic visit or your schedule please contact [facility] INFUSION SERVICES directly at [phone].  Normal or non-critical lab and imaging results will be communicated to you by Movilehart, letter or phone within 4 business days after the clinic has received the results. If you do not hear from us within 7 days, please contact the clinic through [employer] or phone. If you have a critical or abnormal lab result, we will notify you by phone as soon as possible.  Submit refill requests through [employer] or call your pharmacy and they will forward the refill request to us. Please allow 3 business days for your refill to be completed.          Additional Information About Your Visit        Movilehart Information     General [employer] lets you send messages to your doctor, view your test results, renew your prescriptions, schedule appointments and more. To sign up, go to [email or web address] . Click on \"Log in\" on the left side of the screen, which will take you to the Welcome page. Then click on \"Sign up Now\" on the right side of the page.     You will be asked to enter the access code listed below, as well as some personal information. Please follow the directions to create your username and password.     Your access " code is: [email or web address]  Expires: 2017  9:34 AM     Your access code will  in 90 days. If you need help or a new code, please call your [place] clinic or [phone].        Care EveryWhere ID     This is your Care EveryWhere ID. This could be used by other organizations to access your [place] medical records  [id number]        Your Vitals Were     Pulse Temperature Respirations Pulse Oximetry          92 97.5  F (36.4  C) (Tympanic) 16 97%         Blood Pressure from Last 3 Encounters:   17 165/90   17 147/68   17 115/47    Weight from Last 3 Encounters:   11/17/15 64.9 kg (143 lb)   10/12/15 63.5 kg (140 lb)   10/27/12 77.1 kg (170 lb)              We Performed the Following     Obtain affirmation of informed consent     Red blood cell prepare order unit     Transfuse red blood cell unit     Transfuse red blood cell unit     Treatment Conditions        Primary Care Provider Office Phone # Fax #    Abeba Meron Bradford [phone]       [facility] [address] [place] [address]        Thank you!     Thank you for choosing [facility] INFUSION SERVICES  for your care. Our goal is always to provide you with excellent care. Hearing back from our patients is one way we can continue to improve our services. Please take a few minutes to complete the written survey that you may receive in the mail after your visit with us. Thank you!             Your Updated Medication List - Protect others around you: Learn how to safely use, store and throw away your medicines at www.disposemymeds.org.          This list is accurate as of: 3/22/17  1:19 PM.  Always use your most recent med list.                   Brand Name Dispense Instructions for use    acetaminophen 325 MG tablet    TYLENOL    250 tablet    Take 2 tablets by mouth every 4 hours as needed.       B COMPLEX 1 PO      Take by mouth daily       calcium carb 1250 mg (500 mg [place])/vitamin D 200  units 500-200 MG-UNIT per tablet    OSCAL with D     Take 1 tablet by mouth 2 times daily (with meals).       cyanocobalamin 1000 MCG/ML injection    VITAMIN B12     Inject 1 mL into the muscle every 30 days       DARBEPOETIN KEVIN-POLYSORBATE IJ          EFFEXOR XR 75 MG 24 hr capsule   Generic drug:  venlafaxine      Take 75 mg by mouth daily.       EXJADE PO      Take 1,500 mg by mouth every morning (before breakfast)       HYDROMORPHONE HCL PO          losartan-hydrochlorothiazide 100-25 MG per tablet    HYZAAR     Take 1 tablet by mouth daily.       metoprolol 100 MG 24 hr tablet    TOPROL-XL     Take 100 mg by mouth daily.       omeprazole 20 MG CR capsule    priLOSEC     Take 40 mg by mouth daily       VITAMIN MIXTURE PO      Take 2 tablets by mouth daily Occuvite for eye health

## 2017-03-22 NOTE — PROGRESS NOTES
Infusion Nursing Note:  Josy Thomson presents today for 2 units prbc with desferal.    Patient seen by provider today: No   present during visit today: Not Applicable.    Note: N/A.    Intravenous Access:  Peripheral IV placed.    Treatment Conditions:  Blood transfusion consent signed 10/12/16.  hgb  4.4      Post Infusion Assessment:  Patient tolerated infusion without incident.  Site patent and intact, free from redness, edema or discomfort.  No evidence of extravasations.  Access discontinued per protocol.    Discharge Plan:   Patient and/or family verbalized understanding of discharge instructions and all questions answered.  Patient discharged in stable condition accompanied by: self and daughter.  Departure Mode: Wheelchair.    Donna Mccullough RN

## 2017-04-04 NOTE — PROGRESS NOTES
Infusion Nursing Note:  Josy Thomson presents today for 2 units PRBC and desferal.    Patient seen by provider today: No   present during visit today: Not Applicable.    Note: N/A.    Intravenous Access:  2 Peripheral IV placed.    Treatment Conditions:  Blood transfusion consent signed 10/14/16.      Post Infusion Assessment:  Patient tolerated infusion without incident.  Site patent and intact, free from redness, edema or discomfort.  No evidence of extravasations.  Access discontinued per protocol.    Discharge Plan:   Patient declined prescription refills.  Discharge instructions reviewed with: Patient.  Patient and/or family verbalized understanding of discharge instructions and all questions answered.  Copy of AVS reviewed with patient and/or family.   Patient discharged in stable condition accompanied by: daughter.  Departure Mode: Wheelchair.    Kadi Sears RN

## 2017-04-04 NOTE — MR AVS SNAPSHOT
"              After Visit Summary   4/4/2017    Josy Thomson    MRN: [id number]           Patient Information     Date Of Birth          12/12/1927        Visit Information        Provider Department      4/4/2017 8:00 AM RH INFUSION CHAIR 9 [facility] Infusion Services        Today's Diagnoses     MDS (myelodysplastic syndrome) (H)    -  1    Anemia           Follow-ups after your visit        Future tests that were ordered for you today     Open Standing Orders        Priority Remaining Interval Expires Ordered    Red blood cell prepare order unit Routine 99/100 CONDITIONAL (SPECIFY) BLOOD  4/3/2017            Who to contact     If you have questions or need follow up information about today's clinic visit or your schedule please contact [facility] INFUSION SERVICES directly at [phone].  Normal or non-critical lab and imaging results will be communicated to you by MK2Mediahart, letter or phone within 4 business days after the clinic has received the results. If you do not hear from us within 7 days, please contact the clinic through LoraxAgt or phone. If you have a critical or abnormal lab result, we will notify you by phone as soon as possible.  Submit refill requests through T[employer] or call your pharmacy and they will forward the refill request to us. Please allow 3 business days for your refill to be completed.          Additional Information About Your Visit        MK2Mediahart Information     [employer] lets you send messages to your doctor, view your test results, renew your prescriptions, schedule appointments and more. To sign up, go to [email or web address] . Click on \"Log in\" on the left side of the screen, which will take you to the Welcome page. Then click on \"Sign up Now\" on the right side of the page.     You will be asked to enter the access code listed below, as well as some personal information. Please follow the directions to create your username and password.   "   Your access code is: [email or web address]  Expires: 2017  9:34 AM     Your access code will  in 90 days. If you need help or a new code, please call your [place] clinic or [phone].        Care EveryWhere ID     This is your Care EveryWhere ID. This could be used by other organizations to access your [place] medical records  [id number]        Your Vitals Were     Pulse Temperature Respirations Pulse Oximetry          92 97.6  F (36.4  C) (Tympanic) 16 95%         Blood Pressure from Last 3 Encounters:   17 145/76   17 165/90   17 147/68    Weight from Last 3 Encounters:   11/17/15 64.9 kg (143 lb)   10/12/15 63.5 kg (140 lb)   10/27/12 77.1 kg (170 lb)              We Performed the Following     Obtain affirmation of informed consent     Red blood cell prepare order unit     Transfuse red blood cell unit     Transfuse red blood cell unit     Treatment Conditions        Primary Care Provider Office Phone # Fax #    Abeba Bradford [phone]       [facility] [address] [place] [address]        Thank you!     Thank you for choosing [facility] INFUSION SERVICES  for your care. Our goal is always to provide you with excellent care. Hearing back from our patients is one way we can continue to improve our services. Please take a few minutes to complete the written survey that you may receive in the mail after your visit with us. Thank you!             Your Updated Medication List - Protect others around you: Learn how to safely use, store and throw away your medicines at www.disposemymeds.org.          This list is accurate as of: 17 11:30 AM.  Always use your most recent med list.                   Brand Name Dispense Instructions for use    acetaminophen 325 MG tablet    TYLENOL    250 tablet    Take 2 tablets by mouth every 4 hours as needed.       B COMPLEX 1 PO      Take by mouth daily       calcium carb 1250 mg (500 mg  jourdan)/vitamin D 200 units 500-200 MG-UNIT per tablet    OSCAL with D     Take 1 tablet by mouth 2 times daily (with meals).       cyanocobalamin 1000 MCG/ML injection    VITAMIN B12     Inject 1 mL into the muscle every 30 days       DARBEPOETIN KEVIN-POLYSORBATE IJ          EFFEXOR XR 75 MG 24 hr capsule   Generic drug:  venlafaxine      Take 75 mg by mouth daily.       EXJADE PO      Take 1,500 mg by mouth every morning (before breakfast)       HYDROMORPHONE HCL PO          losartan-hydrochlorothiazide 100-25 MG per tablet    HYZAAR     Take 1 tablet by mouth daily.       metoprolol 100 MG 24 hr tablet    TOPROL-XL     Take 100 mg by mouth daily.       omeprazole 20 MG CR capsule    priLOSEC     Take 40 mg by mouth daily       VITAMIN MIXTURE PO      Take 2 tablets by mouth daily Occuvite for eye health

## 2017-05-02 NOTE — MR AVS SNAPSHOT
"              After Visit Summary   5/2/2017    Josy Thomson    MRN: [id number]           Patient Information     Date Of Birth          12/12/1927        Visit Information        Provider Department      5/2/2017 12:00 PM RH INFUSION CHAIR 1 [facility]        Today's Diagnoses     MDS (myelodysplastic syndrome) (H)    -  1    Anemia           Follow-ups after your visit        Future tests that were ordered for you today     Open Standing Orders        Priority Remaining Interval Expires Ordered    ABO/Rh type and screen Routine 24/25 prn 5/1/2018 5/1/2017    Red blood cell prepare order unit Routine 99/100 CONDITIONAL (SPECIFY) BLOOD  5/1/2017            Who to contact     If you have questions or need follow up information about today's clinic visit or your schedule please contact [facility] INF[facility] directly at [phone].  Normal or non-critical lab and imaging results will be communicated to you by iCeuticahart, letter or phone within 4 business days after the clinic has received the results. If you do not hear from us within 7 days, please contact the clinic through Navdyt or phone. If you have a critical or abnormal lab result, we will notify you by phone as soon as possible.  Submit refill requests through Encite or call your pharmacy and they will forward the refill request to us. Please allow 3 business days for your refill to be completed.          Additional Information About Your Visit        MyChart Information     Encite lets you send messages to your doctor, view your test results, renew your prescriptions, schedule appointments and more. To sign up, go to [email or web address] . Click on \"Log in\" on the left side of the screen, which will take you to the Welcome page. Then click on \"Sign up Now\" on the right side of the page.     You will be asked to enter the access code listed below, as well as some personal information. Please " follow the directions to create your username and password.     Your access code is: [email or web address]  Expires: 2017  9:34 AM     Your access code will  in 90 days. If you need help or a new code, please call your [place] clinic or [phone].        Care EveryWhere ID     This is your Care EveryWhere ID. This could be used by other organizations to access your [place] medical records  [id number]        Your Vitals Were     Pulse Temperature Respirations Pulse Oximetry          96 98.5  F (36.9  C) (Tympanic) 18 97%         Blood Pressure from Last 3 Encounters:   17 123/73   17 145/76   17 165/90    Weight from Last 3 Encounters:   11/17/15 64.9 kg (143 lb)   10/12/15 63.5 kg (140 lb)   10/27/12 77.1 kg (170 lb)              We Performed the Following     Obtain affirmation of informed consent     Red blood cell prepare order unit     Transfuse red blood cell unit     Transfuse red blood cell unit     Treatment Conditions          Today's Medication Changes          These changes are accurate as of: 17  4:11 PM.  If you have any questions, ask your nurse or doctor.               Stop taking these medicines if you haven't already. Please contact your care team if you have questions.     JOSS VALENZUELA                    Primary Care Provider Office Phone # Fax #    Abeba Meron Bradford [phone]       [facility] [address] [place] [address]        Thank you!     Thank you for choosing [facility] INFUSION SERVICES  for your care. Our goal is always to provide you with excellent care. Hearing back from our patients is one way we can continue to improve our services. Please take a few minutes to complete the written survey that you may receive in the mail after your visit with us. Thank you!             Your Updated Medication List - Protect others around you: Learn how to safely use, store and throw away your medicines at  www.disposemymeds.org.          This list is accurate as of: 5/2/17  4:11 PM.  Always use your most recent med list.                   Brand Name Dispense Instructions for use    acetaminophen 325 MG tablet    TYLENOL    250 tablet    Take 2 tablets by mouth every 4 hours as needed.       B COMPLEX 1 PO      Take by mouth daily       calcium carb 1250 mg (500 mg [place])/vitamin D 200 units 500-200 MG-UNIT per tablet    OSCAL with D     Take 1 tablet by mouth 2 times daily (with meals).       cyanocobalamin 1000 MCG/ML injection    VITAMIN B12     Inject 1 mL into the muscle every 30 days       DARBEPOETIN KEVIN-POLYSORBATE IJ          deferoxamine      Inject 1,000 mg into the vein once With blood transfusions       EFFEXOR XR 75 MG 24 hr capsule   Generic drug:  venlafaxine      Take 75 mg by mouth daily.       HYDROMORPHONE HCL PO          losartan-hydrochlorothiazide 100-25 MG per tablet    HYZAAR     Take 1 tablet by mouth daily.       metoprolol 100 MG 24 hr tablet    TOPROL-XL     Take 100 mg by mouth daily.       omeprazole 20 MG CR capsule    priLOSEC     Take 40 mg by mouth daily       VITAMIN MIXTURE PO      Take 2 tablets by mouth daily Occuvite for eye health

## 2017-05-02 NOTE — PROGRESS NOTES
Infusion Nursing Note:  Josy Thomson presents today for 2 units PRBC with desferral.    Patient seen by provider today: No   present during visit today: Not Applicable.    Note: HGB 4.0    Intravenous Access:  Peripheral IVs placed.    Treatment Conditions:  Blood transfusion consent signed 10/12/16.  Had desferral 1000mg over time of infusion with different site    Post Infusion Assessment:  Patient tolerated infusion without incident.  Blood return noted pre and post infusion.  Site patent and intact, free from redness, edema or discomfort.  Access discontinued per protocol.    Discharge Plan:   Discharge instructions reviewed with: Patient and Family.  Patient and/or family verbalized understanding of discharge instructions and all questions answered.  Patient discharged in stable condition accompanied by: self and daughters.  Departure Mode: Ambulatory.    Kacie Brooks RN

## 2017-05-17 NOTE — MR AVS SNAPSHOT
"              After Visit Summary   5/17/2017    Josy Thomson    MRN: [id number]           Patient Information     Date Of Birth          12/12/1927        Visit Information        Provider Department      5/17/2017 11:00 AM RH INFUSION CHAIR 3 [facility] Infusion [facility]        Today's Diagnoses     MDS (myelodysplastic syndrome) (H)    -  1    Anemia           Follow-ups after your visit        Future tests that were ordered for you today     Open Standing Orders        Priority Remaining Interval Expires Ordered    Red blood cell prepare order unit Routine 99/100 CONDITIONAL (SPECIFY) BLOOD  5/15/2017            Who to contact     If you have questions or need follow up information about today's clinic visit or your schedule please contact [facility] IN[facility] directly at [phone].  Normal or non-critical lab and imaging results will be communicated to you by Encisionhart, letter or phone within 4 business days after the clinic has received the results. If you do not hear from us within 7 days, please contact the clinic through e[employer]st or phone. If you have a critical or abnormal lab result, we will notify you by phone as soon as possible.  Submit refill requests through [employer] or call your pharmacy and they will forward the refill request to us. Please allow 3 business days for your refill to be completed.          Additional Information About Your Visit        EncisionharCloudOpt Information     [employer] lets you send messages to your doctor, view your test results, renew your prescriptions, schedule appointments and more. To sign up, go to [email or web address] . Click on \"Log in\" on the left side of the screen, which will take you to the Welcome page. Then click on \"Sign up Now\" on the right side of the page.     You will be asked to enter the access code listed below, as well as some personal information. Please follow the directions to create your username and " password.     Your access code is: [email or web address]  Expires: 8/15/2017  4:29 PM     Your access code will  in 90 days. If you need help or a new code, please call your [place] clinic or [phone].        Care EveryWhere ID     This is your Care EveryWhere ID. This could be used by other organizations to access your [place] medical records  [id number]        Your Vitals Were     Pulse Temperature Respirations Pulse Oximetry          92 97.6  F (36.4  C) (Tympanic) 18 92%         Blood Pressure from Last 3 Encounters:   17 110/54   17 123/73   17 145/76    Weight from Last 3 Encounters:   11/17/15 64.9 kg (143 lb)   10/12/15 63.5 kg (140 lb)   10/27/12 77.1 kg (170 lb)              We Performed the Following     Red blood cell prepare order unit     Transfuse red blood cell unit     Transfuse red blood cell unit        Primary Care Provider Office Phone # Fax #    Abeba Meron Bradford [phone]       [facility] [address] [place] [address]        Thank you!     Thank you for choosing [facility] INFUSION [facility]  for your care. Our goal is always to provide you with excellent care. Hearing back from our patients is one way we can continue to improve our services. Please take a few minutes to complete the written survey that you may receive in the mail after your visit with us. Thank you!             Your Updated Medication List - Protect others around you: Learn how to safely use, store and throw away your medicines at www.disposemymeds.org.          This list is accurate as of: 17  4:29 PM.  Always use your most recent med list.                   Brand Name Dispense Instructions for use    acetaminophen 325 MG tablet    TYLENOL    250 tablet    Take 2 tablets by mouth every 4 hours as needed.       B COMPLEX 1 PO      Take by mouth daily       calcium carb 1250 mg (500 mg [place])/vitamin D 200 units 500-200 MG-UNIT per tablet     OSCAL with D     Take 1 tablet by mouth 2 times daily (with meals).       cyanocobalamin 1000 MCG/ML injection    VITAMIN B12     Inject 1 mL into the muscle every 30 days       DARBEPOETIN KEVIN-POLYSORBATE IJ          deferoxamine      Inject 1,000 mg into the vein once With blood transfusions       EFFEXOR XR 75 MG 24 hr capsule   Generic drug:  venlafaxine      Take 75 mg by mouth daily.       HYDROMORPHONE HCL PO          losartan-hydrochlorothiazide 100-25 MG per tablet    HYZAAR     Take 1 tablet by mouth daily.       metoprolol 100 MG 24 hr tablet    TOPROL-XL     Take 100 mg by mouth daily.       omeprazole 20 MG CR capsule    priLOSEC     Take 40 mg by mouth daily       VITAMIN MIXTURE PO      Take 2 tablets by mouth daily Occuvite for eye health

## 2017-05-17 NOTE — PROGRESS NOTES
Infusion Nursing Note:  Josy Thomson presents today for 2 units PRBC.    Patient seen by provider today: No   present during visit today: Not Applicable.    Note: Both units started at 150ml/hr with max rate of 250ml/hr.    Intravenous Access:  Peripheral IV placed.    Treatment Conditions:  Blood transfusion consent signed 10/14/17.      Post Infusion Assessment:  Patient tolerated infusion without incident.  Blood return noted pre and post infusion.  Site patent and intact, free from redness, edema or discomfort.  No evidence of extravasations.  Access discontinued per protocol.    Discharge Plan:   Patient declined prescription refills.  Discharge instructions reviewed with: Patient.  Patient and/or family verbalized understanding of discharge instructions and all questions answered.  Patient discharged in stable condition accompanied by: daughter.  Departure Mode: Ambulatory.    Kadi Sears RN

## 2017-05-31 NOTE — PROGRESS NOTES
Infusion Nursing Note:  Josy Thomson presents today for 1 unit PRBC/Desferral.    Patient seen by provider today: No   present during visit today: Not Applicable.    Note: Pt's daughter states iron elevated and holding units to  One and not 2 units.  assissted by Daughter to bathroom x2    Intravenous Access:  Peripheral IV placed.x2    Treatment Conditions:  Results reviewed, labs MET treatment parameters, ok to proceed with treatment.  Blood transfusion consent signed 10/12/16  4.4 hgb 5/30.      Post Infusion Assessment:  Patient tolerated infusions without incident.  Blood return noted pre and post infusion.  Site patent and intact, free from redness, edema or discomfort.  Access discontinued per protocol.    Discharge Plan:   Discharge instructions reviewed with: Patient and Family.  Patient and/or family verbalized understanding of discharge instructions and all questions answered.  Patient discharged in stable condition accompanied by: self and daughter.  Departure Mode: Wheelchair.    Kacie Brooks RN      Above hgb was per geovanny 4.4 on 5/15/17  5/30 hgb 5.2  -desferral ran over 2 hors    .Kacie Brooks RN

## 2017-05-31 NOTE — MR AVS SNAPSHOT
"              After Visit Summary   5/31/2017    Josy Thomson    MRN: [id number]           Patient Information     Date Of Birth          12/12/1927        Visit Information        Provider Department      5/31/2017 11:00 AM RH INFUSION CHAIR 10 [facility] Infusion [facility]        Today's Diagnoses     MDS (myelodysplastic syndrome) (H)    -  1    Anemia           Follow-ups after your visit        Future tests that were ordered for you today     Open Standing Orders        Priority Remaining Interval Expires Ordered    Red blood cell prepare order unit Routine 99/100 CONDITIONAL (SPECIFY) BLOOD  5/30/2017            Who to contact     If you have questions or need follow up information about today's clinic visit or your schedule please contact [facility] INFUSION [facility] directly at [phone].  Normal or non-critical lab and imaging results will be communicated to you by Frockadvisorhart, letter or phone within 4 business days after the clinic has received the results. If you do not hear from us within 7 days, please contact the clinic through [employer] or phone. If you have a critical or abnormal lab result, we will notify you by phone as soon as possible.  Submit refill requests through CloudTalk or call your pharmacy and they will forward the refill request to us. Please allow 3 business days for your refill to be completed.          Additional Information About Your Visit        FrockadvisorharTaiMed [employer]ologics Information     CloudTalk lets you send messages to your doctor, view your test results, renew your prescriptions, schedule appointments and more. To sign up, go to [email or web address] . Click on \"Log in\" on the left side of the screen, which will take you to the Welcome page. Then click on \"Sign up Now\" on the right side of the page.     You will be asked to enter the access code listed below, as well as some personal information. Please follow the directions to create your username and " password.     Your access code is: [email or web address]  Expires: 8/15/2017  4:29 PM     Your access code will  in 90 days. If you need help or a new code, please call your [place] clinic or [phone].        Care EveryWhere ID     This is your Care EveryWhere ID. This could be used by other organizations to access your [place] medical records  [id number]        Your Vitals Were     Pulse Temperature Respirations Pulse Oximetry          92 97.7  F (36.5  C) (Tympanic) 18 95%         Blood Pressure from Last 3 Encounters:   17 133/60   17 110/54   17 123/73    Weight from Last 3 Encounters:   11/17/15 64.9 kg (143 lb)   10/12/15 63.5 kg (140 lb)   10/27/12 77.1 kg (170 lb)              We Performed the Following     Transfuse red blood cell unit        Primary Care Provider Office Phone # Fax #    Abeba Meron Bradford [phone]       [facility] [address] [place] [address]        Thank you!     Thank you for choosing [facility] INFUSION SERVICES  for your care. Our goal is always to provide you with excellent care. Hearing back from our patients is one way we can continue to improve our services. Please take a few minutes to complete the written survey that you may receive in the mail after your visit with us. Thank you!             Your Updated Medication List - Protect others around you: Learn how to safely use, store and throw away your medicines at www.disposemymeds.org.          This list is accurate as of: 17  5:28 PM.  Always use your most recent med list.                   Brand Name Dispense Instructions for use    acetaminophen 325 MG tablet    TYLENOL    250 tablet    Take 2 tablets by mouth every 4 hours as needed.       B COMPLEX 1 PO      Take by mouth daily       calcium carb 1250 mg (500 mg [place])/vitamin D 200 units 500-200 MG-UNIT per tablet    OSCAL with D     Take 1 tablet by mouth 2 times daily (with meals).        cyanocobalamin 1000 MCG/ML injection    VITAMIN B12     Inject 1 mL into the muscle every 30 days       DARBEPOETIN KEVIN-POLYSORBATE IJ          deferoxamine      Inject 1,000 mg into the vein once With blood transfusions       EFFEXOR XR 75 MG 24 hr capsule   Generic drug:  venlafaxine      Take 75 mg by mouth daily.       HYDROMORPHONE HCL PO          losartan-hydrochlorothiazide 100-25 MG per tablet    HYZAAR     Take 1 tablet by mouth daily.       metoprolol 100 MG 24 hr tablet    TOPROL-XL     Take 100 mg by mouth daily.       omeprazole 20 MG CR capsule    priLOSEC     Take 40 mg by mouth daily       VITAMIN MIXTURE PO      Take 2 tablets by mouth daily Occuvite for eye health

## 2017-06-14 NOTE — MR AVS SNAPSHOT
"              After Visit Summary   2017    Josy Thomson    MRN: [id number]           Patient Information     Date Of Birth          1927        Visit Information        Provider Department      2017 9:00 AM RH INFUSION CHAIR 7 [facility] Infusion [facility]        Today's Diagnoses     MDS (myelodysplastic syndrome) (H)    -  1    Anemia           Follow-ups after your visit        Who to contact     If you have questions or need follow up information about today's clinic visit or your schedule please contact [facility] INFUSION SERVICES directly at [phone].  Normal or non-critical lab and imaging results will be communicated to you by Nirvanixhart, letter or phone within 4 business days after the clinic has received the results. If you do not hear from us within 7 days, please contact the clinic through [employer] or phone. If you have a critical or abnormal lab result, we will notify you by phone as soon as possible.  Submit refill requests through [employer] or call your pharmacy and they will forward the refill request to us. Please allow 3 business days for your refill to be completed.          Additional Information About Your Visit        MyChart Information     [employer] lets you send messages to your doctor, view your test results, renew your prescriptions, schedule appointments and more. To sign up, go to [email or web address] . Click on \"Log in\" on the left side of the screen, which will take you to the Welcome page. Then click on \"Sign up Now\" on the right side of the page.     You will be asked to enter the access code listed below, as well as some personal information. Please follow the directions to create your username and password.     Your access code is: [email or web address]  Expires: 8/15/2017  4:29 PM     Your access code will  in 90 days. If you need help or a new code, please call your [place] clinic or [phone].        Care EveryWhere ID     " This is your Care EveryWhere ID. This could be used by other organizations to access your [place] medical records  [id number]        Your Vitals Were     Pulse Temperature Respirations Pulse Oximetry          96 98.2  F (36.8  C) (Tympanic) 16 97%         Blood Pressure from Last 3 Encounters:   06/14/17 131/67   05/31/17 133/60   05/17/17 110/54    Weight from Last 3 Encounters:   11/17/15 64.9 kg (143 lb)   10/12/15 63.5 kg (140 lb)   10/27/12 77.1 kg (170 lb)              We Performed the Following     Transfuse red blood cell unit     Transfuse red blood cell unit        Primary Care Provider Office Phone # Fax #    Abeba Bradford [phone]       [facility] [address] [place] [address]        Thank you!     Thank you for choosing [facility] INFUSION SERVICES  for your care. Our goal is always to provide you with excellent care. Hearing back from our patients is one way we can continue to improve our services. Please take a few minutes to complete the written survey that you may receive in the mail after your visit with us. Thank you!             Your Updated Medication List - Protect others around you: Learn how to safely use, store and throw away your medicines at www.disposemymeds.org.          This list is accurate as of: 6/14/17  1:03 PM.  Always use your most recent med list.                   Brand Name Dispense Instructions for use    acetaminophen 325 MG tablet    TYLENOL    250 tablet    Take 2 tablets by mouth every 4 hours as needed.       B COMPLEX 1 PO      Take by mouth daily       calcium carb 1250 mg (500 mg [place])/vitamin D 200 units 500-200 MG-UNIT per tablet    OSCAL with D     Take 1 tablet by mouth 2 times daily (with meals).       cyanocobalamin 1000 MCG/ML injection    VITAMIN B12     Inject 1 mL into the muscle every 30 days       DARBEPOETIN KEVIN-POLYSORBATE IJ          deferoxamine      Inject 1,000 mg into the vein once  With blood transfusions       EFFEXOR XR 75 MG 24 hr capsule   Generic drug:  venlafaxine      Take 75 mg by mouth daily.       HYDROMORPHONE HCL PO          losartan-hydrochlorothiazide 100-25 MG per tablet    HYZAAR     Take 1 tablet by mouth daily.       metoprolol 100 MG 24 hr tablet    TOPROL-XL     Take 100 mg by mouth daily.       omeprazole 20 MG CR capsule    priLOSEC     Take 40 mg by mouth daily       VITAMIN MIXTURE PO      Take 2 tablets by mouth daily Occuvite for eye health

## 2017-06-14 NOTE — PROGRESS NOTES
Infusion Nursing Note:  Josy Thomson presents today for 2 units PRBC, desferal.    Patient seen by provider today: No   present during visit today: Not Applicable.    Note: N/A.    Intravenous Access:  Peripheral IV placed x 2.    Treatment Conditions:  Blood transfusion consent signed 10/14/17.    HGB 4.9      Post Infusion Assessment:  Patient tolerated infusion without incident.  Blood return noted pre and post infusion.  Site patent and intact, free from redness, edema or discomfort.  No evidence of extravasations.  Access discontinued per protocol.    Discharge Plan:   Patient discharged in stable condition accompanied by: self and daughter.  Departure Mode: Wheelchair.    Rachel Ordonez RN

## 2017-06-28 NOTE — MR AVS SNAPSHOT
"              After Visit Summary   6/28/2017    Josy Thomson    MRN: [id number]           Patient Information     Date Of Birth          12/12/1927        Visit Information        Provider Department      6/28/2017 12:30 PM [facility] INFUSION CHAIR 7 [facility]        Today's Diagnoses     MDS (myelodysplastic syndrome) (H)    -  1    Anemia           Follow-ups after your visit        Future tests that were ordered for you today     Open Standing Orders        Priority Remaining Interval Expires Ordered    Red blood cell prepare order unit Routine 98/100 CONDITIONAL (SPECIFY) BLOOD  6/26/2017            Who to contact     If you have questions or need follow up information about today's clinic visit or your schedule please contact [facility] INFUSION SERVICES directly at [phone].  Normal or non-critical lab and imaging results will be communicated to you by Askuityhart, letter or phone within 4 business days after the clinic has received the results. If you do not hear from us within 7 days, please contact the clinic through TestPlantt or phone. If you have a critical or abnormal lab result, we will notify you by phone as soon as possible.  Submit refill requests through [employer]astic[employer] or call your pharmacy and they will forward the refill request to us. Please allow 3 business days for your refill to be completed.          Additional Information About Your Visit        AskuityharShippo Information     [employer] lets you send messages to your doctor, view your test results, renew your prescriptions, schedule appointments and more. To sign up, go to [email or web address] . Click on \"Log in\" on the left side of the screen, which will take you to the Welcome page. Then click on \"Sign up Now\" on the right side of the page.     You will be asked to enter the access code listed below, as well as some personal information. Please follow the directions to create your username and " password.     Your access code is: [email or web address]  Expires: 8/15/2017  4:29 PM     Your access code will  in 90 days. If you need help or a new code, please call your [place] clinic or [phone].        Care EveryWhere ID     This is your Care EveryWhere ID. This could be used by other organizations to access your [place] medical records  [id number]        Your Vitals Were     Pulse Temperature Respirations Pulse Oximetry          88 97.7  F (36.5  C) (Tympanic) 16 98%         Blood Pressure from Last 3 Encounters:   17 118/56   17 131/67   17 133/60    Weight from Last 3 Encounters:   11/17/15 64.9 kg (143 lb)   10/12/15 63.5 kg (140 lb)   10/27/12 77.1 kg (170 lb)              We Performed the Following     Transfuse red blood cell unit        Primary Care Provider Office Phone # Fax #    Abeba Meron Bradford [phone]       [facility] [address] [place] [address]        Equal Access to Services     [facility]: Hadii aad ku hadasho Soomaali, waaxda luqadaha, qaybta kaalmada adeegyada, zia milliganin hayaan otoniel sheets . So [facility] [phone].    ATENCIÓN: Si habla español, tiene a joe disposición servicios gratuitos de asistencia lingüística. [facility] [phone].    We comply with applicable federal civil rights laws and [place] laws. We do not discriminate on the basis of race, color, national origin, age, disability sex, sexual orientation or gender identity.            Thank you!     Thank you for choosing [facility] INFUSION SERVICES  for your care. Our goal is always to provide you with excellent care. Hearing back from our patients is one way we can continue to improve our services. Please take a few minutes to complete the written survey that you may receive in the mail after your visit with us. Thank you!             Your Updated Medication List - Protect others around you: Learn how to safely use, store and  throw away your medicines at www.disposemymeds.org.          This list is accurate as of: 6/28/17  2:27 PM.  Always use your most recent med list.                   Brand Name Dispense Instructions for use Diagnosis    acetaminophen 325 MG tablet    TYLENOL    250 tablet    Take 2 tablets by mouth every 4 hours as needed.    OA (osteoarthritis)       B COMPLEX 1 PO      Take by mouth daily        calcium carb 1250 mg (500 mg [place])/vitamin D 200 units 500-200 MG-UNIT per tablet    OSCAL with D     Take 1 tablet by mouth 2 times daily (with meals).        cyanocobalamin 1000 MCG/ML injection    VITAMIN B12     Inject 1 mL into the muscle every 30 days        DARBEPOETIN KEVIN-POLYSORBATE IJ           deferoxamine      Inject 1,000 mg into the vein once With blood transfusions        EFFEXOR XR 75 MG 24 hr capsule   Generic drug:  venlafaxine      Take 75 mg by mouth daily.        HYDROMORPHONE HCL PO           losartan-hydrochlorothiazide 100-25 MG per tablet    HYZAAR     Take 1 tablet by mouth daily.        metoprolol 100 MG 24 hr tablet    TOPROL-XL     Take 100 mg by mouth daily.        omeprazole 20 MG CR capsule    priLOSEC     Take 40 mg by mouth daily        VITAMIN MIXTURE PO      Take 2 tablets by mouth daily Occuvite for eye health

## 2017-06-28 NOTE — PROGRESS NOTES
Infusion Nursing Note:  Josy Thomson presents today for desferal and 1 unit PRBC.    Patient seen by provider today: No   present during visit today: Not Applicable.    Note: N/A.    Intravenous Access:  Peripheral IV placed.    Treatment Conditions:  Blood transfusion consent signed 10/14/16.      Post Infusion Assessment:  Patient tolerated infusion without incident.  Blood return noted pre and post infusion.  Site patent and intact, free from redness, edema or discomfort.  No evidence of extravasations.  Access discontinued per protocol.    Discharge Plan:   Patient declined prescription refills.  Discharge instructions reviewed with: Patient.  Patient and/or family verbalized understanding of discharge instructions and all questions answered.  Patient discharged in stable condition accompanied by: daughter.  Departure Mode: Wheelchair.    Kadi Sears RN

## 2017-07-12 NOTE — PROGRESS NOTES
Infusion Nursing Note:  Josy Thomson presents today for 2 units prbc and Desferal  Patient seen by provider today: No   present during visit today: Not Applicable.    Note: N/A.    Intravenous Access:  Peripheral IV placed x 2    Treatment Conditions:  Blood transfusion consent signed 10/12/16  hgb 4.8      Post Infusion Assessment:  Patient tolerated infusion without incident.  Site patent and intact, free from redness, edema or discomfort.  No evidence of extravasations.  Access discontinued per protocol.    Discharge Plan:   Patient and/or family verbalized understanding of discharge instructions and all questions answered.  AVS to patient via ClarisonicHART.  Patient will return prn for next appointment.   Patient discharged in stable condition accompanied by: self and daughter.  Departure Mode: Wheelchair.    Donna Mccullough RN

## 2017-07-12 NOTE — MR AVS SNAPSHOT
"              After Visit Summary   7/12/2017    Josy Thomson    MRN: [id number]           Patient Information     Date Of Birth          12/12/1927        Visit Information        Provider Department      7/12/2017 8:00 AM RH INFUSION CHAIR 10 [facility]        Today's Diagnoses     MDS (myelodysplastic syndrome) (H)    -  1    Anemia           Follow-ups after your visit        Future tests that were ordered for you today     Open Standing Orders        Priority Remaining Interval Expires Ordered    Red blood cell prepare order unit Routine 99/100 CONDITIONAL (SPECIFY) BLOOD  7/10/2017            Who to contact     If you have questions or need follow up information about today's clinic visit or your schedule please contact [facility] INFUSION SERVICES directly at [phone].  Normal or non-critical lab and imaging results will be communicated to you by Solarte Healthhart, letter or phone within 4 business days after the clinic has received the results. If you do not hear from us within 7 days, please contact the clinic through Rani Therapeutic[employer] or phone. If you have a critical or abnormal lab result, we will notify you by phone as soon as possible.  Submit refill requests through [email or web address] or call your pharmacy and they will forward the refill request to us. Please allow 3 business days for your refill to be completed.          Additional Information About Your Visit        Solarte HealthharZyga Information     [email or web address] lets you send messages to your doctor, view your test results, renew your prescriptions, schedule appointments and more. To sign up, go to [email or web address] . Click on \"Log in\" on the left side of the screen, which will take you to the Welcome page. Then click on \"Sign up Now\" on the right side of the page.     You will be asked to enter the access code listed below, as well as some personal information. Please follow the directions to create your username and " password.     Your access code is: [email or web address]  Expires: 8/15/2017  4:29 PM     Your access code will  in 90 days. If you need help or a new code, please call your [place] clinic or [phone].        Care EveryWhere ID     This is your Care EveryWhere ID. This could be used by other organizations to access your [place] medical records  [id number]        Your Vitals Were     Pulse Temperature Respirations Pulse Oximetry          86 97.4  F (36.3  C) (Tympanic) 18 95%         Blood Pressure from Last 3 Encounters:   17 133/58   17 118/56   17 131/67    Weight from Last 3 Encounters:   11/17/15 64.9 kg (143 lb)   10/12/15 63.5 kg (140 lb)   10/27/12 77.1 kg (170 lb)              We Performed the Following     Transfuse red blood cell unit     Transfuse red blood cell unit        Primary Care Provider Office Phone # Fax #    Abeba Meron Bradford [phone]       [facility] [address] [place] [address]        Equal Access to Services     [facility]: Hadii aad ku hadasho Soomaali, waaxda luqadaha, qaybta kaalmada adeegyada, waxay idiin hayaan aderosalind colladoarajesus lariley . So [facility] [phone].    ATENCIÓN: Si habla español, tiene a joe disposición servicios gratuitos de asistencia lingüística. Llame al [phone].    We comply with applicable federal civil rights laws and [place] laws. We do not discriminate on the basis of race, color, national origin, age, disability sex, sexual orientation or gender identity.            Thank you!     Thank you for choosing [facility] INFUSION SERVICES  for your care. Our goal is always to provide you with excellent care. Hearing back from our patients is one way we can continue to improve our services. Please take a few minutes to complete the written survey that you may receive in the mail after your visit with us. Thank you!             Your Updated Medication List - Protect others around you: Learn  how to safely use, store and throw away your medicines at www.disposemymeds.org.          This list is accurate as of: 7/12/17  2:42 PM.  Always use your most recent med list.                   Brand Name Dispense Instructions for use Diagnosis    acetaminophen 325 MG tablet    TYLENOL    250 tablet    Take 2 tablets by mouth every 4 hours as needed.    OA (osteoarthritis)       B COMPLEX 1 PO      Take by mouth daily        calcium carb 1250 mg (500 mg [place])/vitamin D 200 units 500-200 MG-UNIT per tablet    OSCAL with D     Take 1 tablet by mouth 2 times daily (with meals).        cyanocobalamin 1000 MCG/ML injection    VITAMIN B12     Inject 1 mL into the muscle every 30 days        DARBEPOETIN KEVIN-POLYSORBATE IJ           deferoxamine      Inject 1,000 mg into the vein once With blood transfusions        EFFEXOR XR 75 MG 24 hr capsule   Generic drug:  venlafaxine      Take 75 mg by mouth daily.        HYDROMORPHONE HCL PO           losartan-hydrochlorothiazide 100-25 MG per tablet    HYZAAR     Take 1 tablet by mouth daily.        metoprolol 100 MG 24 hr tablet    TOPROL-XL     Take 100 mg by mouth daily.        omeprazole 20 MG CR capsule    priLOSEC     Take 40 mg by mouth daily        VITAMIN MIXTURE PO      Take 2 tablets by mouth daily Occuvite for eye health

## 2017-07-25 NOTE — PROGRESS NOTES
Infusion Nursing Note:  Josy Thomson presents today for 2 units PRBC/desferral.    Patient seen by provider today: No   present during visit today: Not Applicable.    Note: hgb 5.1.    Intravenous Access:  Peripheral IV placedx2    Treatment Conditions:  Blood transfusion consent signed 10/ 12 /16.      Post Infusion Assessment:  Patient tolerated infusion without incident.  Blood return noted pre and post infusion.  Site patent and intact, free from redness, edema or discomfort.  No evidence of extravasations.  Access discontinued per protocol.    Discharge Plan:   Discharge instructions reviewed with: Patient and Family.  Patient and/or family verbalized understanding of discharge instructions and all questions answered.  Patient discharged in stable condition accompanied by: self and daughter.  Departure Mode: Wheelchair.    Kacie Brooks RN

## 2017-08-09 NOTE — PROGRESS NOTES
Infusion Nursing Note:  Josy Thomson presents today for 1 unit packed red blood cells and Desferal.    Patient seen by provider today: No   present during visit today: Not Applicable.    Note: 2 gm of Desferal infused over 2.5 hours. Blood ran at 150 ml/hr for entire infusion simply to finish blood at approx time of Desferal completion.     Intravenous Access:  Peripheral IV placed.    Treatment Conditions:  Blood transfusion consent signed 10/14/17.  Hgb 5.3 on 8/7/17.      Post Infusion Assessment:  Patient tolerated infusion without incident.  Blood return noted pre and post infusion.  Site patent and intact, free from redness, edema or discomfort.  No evidence of extravasations.  Access discontinued per protocol.    Discharge Plan:   Patient declined prescription refills.  Copy of AVS reviewed with patient and/or family.  Patient will f/u with her provider regarding future appt's. None scheduled at this time.   Patient discharged in stable condition accompanied by: daughter.  Departure Mode: Wheelchair.    Wanda Lombardi RN

## 2017-08-09 NOTE — MR AVS SNAPSHOT
"              After Visit Summary   8/9/2017    Josy Thomson    MRN: [id number]           Patient Information     Date Of Birth          12/12/1927        Visit Information        Provider Department      8/9/2017 10:30 AM RH INFUSION CHAIR 1 [facility]fusion [facility]        Today's Diagnoses     MDS (myelodysplastic syndrome) (H)    -  1    Anemia           Follow-ups after your visit        Future tests that were ordered for you today     Open Standing Orders        Priority Remaining Interval Expires Ordered    Red blood cell prepare order unit Routine 99/100 CONDITIONAL (SPECIFY) BLOOD  8/9/2017            Who to contact     If you have questions or need follow up information about today's clinic visit or your schedule please contact [facility] INFUSION SERVICES directly at [phone].  Normal or non-critical lab and imaging results will be communicated to you by "[employer]hart, letter or phone within 4 business days after the clinic has received the results. If you do not hear from us within 7 days, please contact the clinic through ArQulet or phone. If you have a critical or abnormal lab result, we will notify you by phone as soon as possible.  Submit refill requests through CheckBonus or call your pharmacy and they will forward the refill request to us. Please allow 3 business days for your refill to be completed.          Additional Information About Your Visit        "[employer]harAeonmed Medical Treatment Information     CheckBonus lets you send messages to your doctor, view your test results, renew your prescriptions, schedule appointments and more. To sign up, go to [email or web address] . Click on \"Log in\" on the left side of the screen, which will take you to the Welcome page. Then click on \"Sign up Now\" on the right side of the page.     You will be asked to enter the access code listed below, as well as some personal information. Please follow the directions to create your username and password.   "   Your access code is: [email or web address]  Expires: 8/15/2017  4:29 PM     Your access code will  in 90 days. If you need help or a new code, please call your [place] clinic or [phone].        Care EveryWhere ID     This is your Care EveryWhere ID. This could be used by other organizations to access your [place] medical records  [id number]        Your Vitals Were     Pulse Temperature Respirations Pulse Oximetry          80 97.8  F (36.6  C) (Tympanic) 16 100%         Blood Pressure from Last 3 Encounters:   17 115/51   17 136/74   17 133/58    Weight from Last 3 Encounters:   11/17/15 64.9 kg (143 lb)   10/12/15 63.5 kg (140 lb)   10/27/12 77.1 kg (170 lb)              We Performed the Following     Transfuse red blood cell unit        Primary Care Provider Office Phone # Fax #    Abeba Meron Bradford [phone]       [facility] [address] [place] [address]        Equal Access to Services     [facility] : Hadii aad ku hadasho Soomaali, waaxda luqadaha, qaybta kaalmada adeegyada, zia sheets . So [facility] [phone].    ATENCIÓN: Si habla español, tiene a joe disposición servicios gratuitos de asistencia lingüística. Ll[facility] [phone].    We comply with applicable federal civil rights laws and [place] laws. We do not discriminate on the basis of race, color, national origin, age, disability sex, sexual orientation or gender identity.            Thank you!     Thank you for choosing [facility] INF[facility]  for your care. Our goal is always to provide you with excellent care. Hearing back from our patients is one way we can continue to improve our services. Please take a few minutes to complete the written survey that you may receive in the mail after your visit with us. Thank you!             Your Updated Medication List - Protect others around you: Learn how to safely use, store and throw away your  medicines at www.disposemymeds.org.          This list is accurate as of: 8/9/17  3:26 PM.  Always use your most recent med list.                   Brand Name Dispense Instructions for use Diagnosis    acetaminophen 325 MG tablet    TYLENOL    250 tablet    Take 2 tablets by mouth every 4 hours as needed.    OA (osteoarthritis)       B COMPLEX 1 PO      Take by mouth daily        calcium carb 1250 mg (500 mg [place])/vitamin D 200 units 500-200 MG-UNIT per tablet    OSCAL with D     Take 1 tablet by mouth 2 times daily (with meals).        cyanocobalamin 1000 MCG/ML injection    VITAMIN B12     Inject 1 mL into the muscle every 30 days        DARBEPOETIN KEVIN-POLYSORBATE IJ           deferoxamine      Inject 1,000 mg into the vein once With blood transfusions        EFFEXOR XR 75 MG 24 hr capsule   Generic drug:  venlafaxine      Take 75 mg by mouth daily.        HYDROMORPHONE HCL PO           losartan-hydrochlorothiazide 100-25 MG per tablet    HYZAAR     Take 1 tablet by mouth daily.        metoprolol 100 MG 24 hr tablet    TOPROL-XL     Take 100 mg by mouth daily.        omeprazole 20 MG CR capsule    priLOSEC     Take 40 mg by mouth daily        VITAMIN MIXTURE PO      Take 2 tablets by mouth daily Occuvite for eye health

## 2017-08-23 NOTE — PROGRESS NOTES
Infusion Nursing Note:  Josy Thomson presents today for 2 units prbc with Desferal.    Patient seen by provider today: No   present during visit today: Not Applicable.    Note: N/A.    Intravenous Access:  Peripheral IV placed x 2    Treatment Conditions:  Blood transfusion consent signed 10/12/16.  hgb 4.6      Post Infusion Assessment:  Patient tolerated infusion without incident.  Site patent and intact, free from redness, edema or discomfort.  No evidence of extravasations.  Access discontinued per protocol.    Discharge Plan:   Patient and/or family verbalized understanding of discharge instructions and all questions answered.  Copy of AVS reviewed with patient and/or family.  Patient will return prnfor next appointment.  Patient discharged in stable condition accompanied by: self and daughter.  Departure Mode: Wheelchair.    Donna Mccullough RN

## 2017-08-23 NOTE — MR AVS SNAPSHOT
"              After Visit Summary   8/23/2017    Josy Thomson    MRN: [id number]           Patient Information     Date Of Birth          12/12/1927        Visit Information        Provider Department      8/23/2017 11:00 AM RH INFUSION CHAIR 10 [facility] Infusion [facility]        Today's Diagnoses     MDS (myelodysplastic syndrome) (H)    -  1    Anemia           Follow-ups after your visit        Future tests that were ordered for you today     Open Standing Orders        Priority Remaining Interval Expires Ordered    Red blood cell prepare order unit Routine 99/100 CONDITIONAL (SPECIFY) BLOOD  8/22/2017            Who to contact     If you have questions or need follow up information about today's clinic visit or your schedule please contact [facility] INFUSION [facility] directly at [phone].  Normal or non-critical lab and imaging results will be communicated to you by Ocean Seedhart, letter or phone within 4 business days after the clinic has received the results. If you do not hear from us within 7 days, please contact the clinic through yourdeliveryt or phone. If you have a critical or abnormal lab result, we will notify you by phone as soon as possible.  Submit refill requests through What's Trending or call your pharmacy and they will forward the refill request to us. Please allow 3 business days for your refill to be completed.          Additional Information About Your Visit        Ocean SeedharServoy Information     What's Trending lets you send messages to your doctor, view your test results, renew your prescriptions, schedule appointments and more. To sign up, go to [email or web address] . Click on \"Log in\" on the left side of the screen, which will take you to the Welcome page. Then click on \"Sign up Now\" on the right side of the page.     You will be asked to enter the access code listed below, as well as some personal information. Please follow the directions to create your username and " password.     Your access code is: [email or web address]  Expires: 2017  3:49 PM     Your access code will  in 90 days. If you need help or a new code, please call your [facility]] or [phone].        Care EveryWhere ID     This is your Care EveryWhere ID. This could be used by other organizations to access your [place] medical records  [id number]        Your Vitals Were     Pulse Temperature Respirations Pulse Oximetry          90 97.6  F (36.4  C) (Tympanic) 16 98%         Blood Pressure from Last 3 Encounters:   17 127/67   17 115/51   17 136/74    Weight from Last 3 Encounters:   11/17/15 64.9 kg (143 lb)   10/12/15 63.5 kg (140 lb)   10/27/12 77.1 kg (170 lb)              We Performed the Following     Transfuse red blood cell unit     Transfuse red blood cell unit        Primary Care Provider Office Phone # Fax #    Abeba Meron Bradford [phone]       [facility] [address] [place] [address]        Equal Access to Services     [facility]: Hadii aad ku hadasho Soomaali, waaxda luqadaha, qaybta kaalmada adeegyada, waxay idiin hayaan otoniel sheets . So [facility] [phone].    ATENCIÓN: Si habla español, tiene a joe disposición servicios gratuitos de asistencia lingüística. Llame al [phone].    We comply with applicable federal civil rights laws and [place] laws. We do not discriminate on the basis of race, color, national origin, age, disability sex, sexual orientation or gender identity.            Thank you!     Thank you for choosing [facility] INFUSION SERVICES  for your care. Our goal is always to provide you with excellent care. Hearing back from our patients is one way we can continue to improve our services. Please take a few minutes to complete the written survey that you may receive in the mail after your visit with us. Thank you!             Your Updated Medication List - Protect others around you:  Learn how to safely use, store and throw away your medicines at www.disposemymeds.org.          This list is accurate as of: 8/23/17  3:49 PM.  Always use your most recent med list.                   Brand Name Dispense Instructions for use Diagnosis    acetaminophen 325 MG tablet    TYLENOL    250 tablet    Take 2 tablets by mouth every 4 hours as needed.    OA (osteoarthritis)       B COMPLEX 1 PO      Take by mouth daily        calcium carb 1250 mg (500 mg [place])/vitamin D 200 units 500-200 MG-UNIT per tablet    OSCAL with D     Take 1 tablet by mouth 2 times daily (with meals).        cyanocobalamin 1000 MCG/ML injection    VITAMIN B12     Inject 1 mL into the muscle every 30 days        DARBEPOETIN KEVIN-POLYSORBATE IJ           deferoxamine      Inject 1,000 mg into the vein once With blood transfusions        EFFEXOR XR 75 MG 24 hr capsule   Generic drug:  venlafaxine      Take 75 mg by mouth daily.        HYDROMORPHONE HCL PO           losartan-hydrochlorothiazide 100-25 MG per tablet    HYZAAR     Take 1 tablet by mouth daily.        metoprolol 100 MG 24 hr tablet    TOPROL-XL     Take 100 mg by mouth daily.        omeprazole 20 MG CR capsule    priLOSEC     Take 40 mg by mouth daily        VITAMIN MIXTURE PO      Take 2 tablets by mouth daily Occuvite for eye health

## 2017-09-06 NOTE — PROGRESS NOTES
Infusion Nursing Note:  Josy Thomson presents today for Desferal, 1 unit prbc.    Patient seen by provider today: No   present during visit today: Not Applicable.    Note: N/A.    Intravenous Access:  Peripheral IV placed x 2    Treatment Conditions:  Blood transfusion consent signed 10/12/16  hgb 5.1      Post Infusion Assessment:  Patient tolerated infusion without incident.  Site patent and intact, free from redness, edema or discomfort.  No evidence of extravasations.  Access discontinued per protocol.    Discharge Plan:   Patient and/or family verbalized understanding of discharge instructions and all questions answered.  Patient discharged in stable condition accompanied by: self and daughter.  Departure Mode: Wheelchair.    Donna Mccullough RN

## 2017-09-06 NOTE — MR AVS SNAPSHOT
"              After Visit Summary   9/6/2017    Josy Thomson    MRN: [id number]           Patient Information     Date Of Birth          12/12/1927        Visit Information        Provider Department      9/6/2017 10:30 AM RH INFUSION CHAIR 5 [facility] Infusion [facility]        Today's Diagnoses     MDS (myelodysplastic syndrome) (H)    -  1    Anemia           Follow-ups after your visit        Future tests that were ordered for you today     Open Standing Orders        Priority Remaining Interval Expires Ordered    Red blood cell prepare order unit Routine 99/100 CONDITIONAL (SPECIFY) BLOOD  9/5/2017            Who to contact     If you have questions or need follow up information about today's clinic visit or your schedule please contact [facility] INFUSION SERVICES directly at [phone].  Normal or non-critical lab and imaging results will be communicated to you by YOGASMOGAhart, letter or phone within 4 business days after the clinic has received the results. If you do not hear from us within 7 days, please contact the clinic through Mercantilat or phone. If you have a critical or abnormal lab result, we will notify you by phone as soon as possible.  Submit refill requests through Pillars4Life or call your pharmacy and they will forward the refill request to us. Please allow 3 business days for your refill to be completed.          Additional Information About Your Visit        YOGASMOGAharWorld First Information     Pillars4Life lets you send messages to your doctor, view your test results, renew your prescriptions, schedule appointments and more. To sign up, go to [email or web address] . Click on \"Log in\" on the left side of the screen, which will take you to the Welcome page. Then click on \"Sign up Now\" on the right side of the page.     You will be asked to enter the access code listed below, as well as some personal information. Please follow the directions to create your username and password.   "   Your access code is: [email or web address]  Expires: 2017  3:49 PM     Your access code will  in 90 days. If you need help or a new code, please call your [place] clinic or [phone].        Care EveryWhere ID     This is your Care EveryWhere ID. This could be used by other organizations to access your [place] medical records  [id number]        Your Vitals Were     Pulse Temperature Respirations Pulse Oximetry          99 97.8  F (36.6  C) (Tympanic) 16 99%         Blood Pressure from Last 3 Encounters:   17 152/63   17 127/67   17 115/51    Weight from Last 3 Encounters:   11/17/15 64.9 kg (143 lb)   10/12/15 63.5 kg (140 lb)   10/27/12 77.1 kg (170 lb)              We Performed the Following     Transfuse red blood cell unit        Primary Care Provider Office Phone # Fax #    Abeba Meron Bradford [phone]       [facility] [address] [place] [address]        Equal Access to Services     [facility] : Hadii aad ku hadasho Soomaali, waaxda luqadaha, qaybta kaalmada adeaaron, zia sheets . So [facility] [phone].    ATENCIÓN: Si habla español, tiene a joe disposición servicios gratuitos de asistencia lingüística. Ll[facility] [phone].    We comply with applicable federal civil rights laws and [place] laws. We do not discriminate on the basis of race, color, national origin, age, disability sex, sexual orientation or gender identity.            Thank you!     Thank you for choosing [facility]  for your care. Our goal is always to provide you with excellent care. Hearing back from our patients is one way we can continue to improve our services. Please take a few minutes to complete the written survey that you may receive in the mail after your visit with us. Thank you!             Your Updated Medication List - Protect others around you: Learn how to safely use, store and throw away your  medicines at www.disposemymeds.org.          This list is accurate as of: 9/6/17  1:03 PM.  Always use your most recent med list.                   Brand Name Dispense Instructions for use Diagnosis    acetaminophen 325 MG tablet    TYLENOL    250 tablet    Take 2 tablets by mouth every 4 hours as needed.    OA (osteoarthritis)       B COMPLEX 1 PO      Take by mouth daily        calcium carb 1250 mg (500 mg [place])/vitamin D 200 units 500-200 MG-UNIT per tablet    OSCAL with D     Take 1 tablet by mouth 2 times daily (with meals).        cyanocobalamin 1000 MCG/ML injection    VITAMIN B12     Inject 1 mL into the muscle every 30 days        DARBEPOETIN KEVIN-POLYSORBATE IJ           deferoxamine      Inject 1,000 mg into the vein once With blood transfusions        EFFEXOR XR 75 MG 24 hr capsule   Generic drug:  venlafaxine      Take 75 mg by mouth daily.        HYDROMORPHONE HCL PO           losartan-hydrochlorothiazide 100-25 MG per tablet    HYZAAR     Take 1 tablet by mouth daily.        metoprolol 100 MG 24 hr tablet    TOPROL-XL     Take 100 mg by mouth daily.        omeprazole 20 MG CR capsule    priLOSEC     Take 40 mg by mouth daily        VITAMIN MIXTURE PO      Take 2 tablets by mouth daily Occuvite for eye health

## 2017-09-19 NOTE — MR AVS SNAPSHOT
"              After Visit Summary   9/19/2017    Josy Thomson    MRN: [id number]           Patient Information     Date Of Birth          12/12/1927        Visit Information        Provider Department      9/19/2017 8:00 AM RH INFUSION CHAIR 8 [facility] Infusion [facility]        Today's Diagnoses     MDS (myelodysplastic syndrome) (H)    -  1    Anemia           Follow-ups after your visit        Future tests that were ordered for you today     Open Standing Orders        Priority Remaining Interval Expires Ordered    Red blood cell prepare order unit Routine 98/100 CONDITIONAL (SPECIFY) BLOOD  9/18/2017            Who to contact     If you have questions or need follow up information about today's clinic visit or your schedule please contact [facility] INFUSION SERVICES directly at [phone].  Normal or non-critical lab and imaging results will be communicated to you by SASH Senior Home Sale Serviceshart, letter or phone within 4 business days after the clinic has received the results. If you do not hear from us within 7 days, please contact the clinic through Easy Taxit or phone. If you have a critical or abnormal lab result, we will notify you by phone as soon as possible.  Submit refill requests through Advise Only or call your pharmacy and they will forward the refill request to us. Please allow 3 business days for your refill to be completed.          Additional Information About Your Visit        SASH Senior Home Sale ServicesharStratatech Corporation Information     Advise Only lets you send messages to your doctor, view your test results, renew your prescriptions, schedule appointments and more. To sign up, go to [email or web address] . Click on \"Log in\" on the left side of the screen, which will take you to the Welcome page. Then click on \"Sign up Now\" on the right side of the page.     You will be asked to enter the access code listed below, as well as some personal information. Please follow the directions to create your username and password.   "   Your access code is: [email or web address]  Expires: 2017  3:49 PM     Your access code will  in 90 days. If you need help or a new code, please call your [place] clinic or [phone].        Care EveryWhere ID     This is your Care EveryWhere ID. This could be used by other organizations to access your [place] medical records  [id number]        Your Vitals Were     Pulse Temperature Respirations Pulse Oximetry          100 97.4  F (36.3  C) (Tympanic) 16 99%         Blood Pressure from Last 3 Encounters:   17 157/75   17 152/63   17 127/67    Weight from Last 3 Encounters:   11/17/15 64.9 kg (143 lb)   10/12/15 63.5 kg (140 lb)   10/27/12 77.1 kg (170 lb)              We Performed the Following     Transfuse red blood cell unit     Transfuse red blood cell unit        Primary Care Provider Office Phone # Fax #    bAeba Meron Bradford [phone]       [facility] [address] [place] [address]        Equal Access to Services     [facility] : Hadii aad ku hadasho Soomaali, waaxda luqadaha, qaybta kaalmada adeegyada, zia serrato hayhienn otoniel sheets . So [facility] [phone].    ATENCIÓN: Si habla español, tiene a joe disposición servicios gratuitos de asistencia lingüística. Llame al [phone].    We comply with applicable federal civil rights laws and [place] laws. We do not discriminate on the basis of race, color, national origin, age, disability sex, sexual orientation or gender identity.            Thank you!     Thank you for choosing [facility] INFUSION SERVICES  for your care. Our goal is always to provide you with excellent care. Hearing back from our patients is one way we can continue to improve our services. Please take a few minutes to complete the written survey that you may receive in the mail after your visit with us. Thank you!             Your Updated Medication List - Protect others around you: Learn how to  safely use, store and throw away your medicines at www.disposemymeds.org.          This list is accurate as of: 9/19/17 12:48 PM.  Always use your most recent med list.                   Brand Name Dispense Instructions for use Diagnosis    acetaminophen 325 MG tablet    TYLENOL    250 tablet    Take 2 tablets by mouth every 4 hours as needed.    OA (osteoarthritis)       B COMPLEX 1 PO      Take by mouth daily        calcium carb 1250 mg (500 mg [place])/vitamin D 200 units 500-200 MG-UNIT per tablet    OSCAL with D     Take 1 tablet by mouth 2 times daily (with meals).        cyanocobalamin 1000 MCG/ML injection    VITAMIN B12     Inject 1 mL into the muscle every 30 days        DARBEPOETIN KEVIN-POLYSORBATE IJ           deferoxamine      Inject 1,000 mg into the vein once With blood transfusions        EFFEXOR XR 75 MG 24 hr capsule   Generic drug:  venlafaxine      Take 75 mg by mouth daily.        HYDROMORPHONE HCL PO           losartan-hydrochlorothiazide 100-25 MG per tablet    HYZAAR     Take 1 tablet by mouth daily.        metoprolol 100 MG 24 hr tablet    TOPROL-XL     Take 100 mg by mouth daily.        omeprazole 20 MG CR capsule    priLOSEC     Take 40 mg by mouth daily        VITAMIN MIXTURE PO      Take 2 tablets by mouth daily Occuvite for eye health

## 2017-09-19 NOTE — PROGRESS NOTES
Infusion Nursing Note:  Josy Thomson presents today for 2 units of PRBC's and Desferal.    Patient seen by provider today: No   present during visit today: Not Applicable.    Note: N/A.    Intravenous Access:  Peripheral IV placed. 2 PIVs placed- one for Desferal and 1 for PRBC's.    Treatment Conditions:  Blood transfusion consent signed 10/12/16.  Hemoglobin 4.3.      Post Infusion Assessment:  Patient tolerated infusion without incident.  Blood return noted pre and post infusion.  Site patent and intact, free from redness, edema or discomfort.  No evidence of extravasations.  Access discontinued per protocol.    Discharge Plan:   Discharge instructions reviewed with: Patient and Family.  Patient and/or family verbalized understanding of discharge instructions and all questions answered.  Patient discharged in stable condition accompanied by: daughter.  Departure Mode: Wheelchair.    Yanni Masterson RN

## 2017-10-04 NOTE — MR AVS SNAPSHOT
"              After Visit Summary   10/4/2017    Josy Thomson    MRN: [id number]           Patient Information     Date Of Birth          12/12/1927        Visit Information        Provider Department      10/4/2017 12:00 PM RH INFUSION CHAIR 5 [facility] Infusion Services        Today's Diagnoses     MDS (myelodysplastic syndrome) (H)    -  1    Anemia           Follow-ups after your visit        Future tests that were ordered for you today     Open Standing Orders        Priority Remaining Interval Expires Ordered    Red blood cell prepare order unit Routine 99/100 CONDITIONAL (SPECIFY) BLOOD  10/3/2017            Who to contact     If you have questions or need follow up information about today's clinic visit or your schedule please contact [facility] INFUSION [facility] directly at [phone].  Normal or non-critical lab and imaging results will be communicated to you by Juvent Regenerative Technologies Corporationhart, letter or phone within 4 business days after the clinic has received the results. If you do not hear from us within 7 days, please contact the clinic through ContentWatcht or phone. If you have a critical or abnormal lab result, we will notify you by phone as soon as possible.  Submit refill requests through Convoe or call your pharmacy and they will forward the refill request to us. Please allow 3 business days for your refill to be completed.          Additional Information About Your Visit        Juvent Regenerative Technologies CorporationharStarfish Retention Solutions Information     Convoe lets you send messages to your doctor, view your test results, renew your prescriptions, schedule appointments and more. To sign up, go to [email or web address] . Click on \"Log in\" on the left side of the screen, which will take you to the Welcome page. Then click on \"Sign up Now\" on the right side of the page.     You will be asked to enter the access code listed below, as well as some personal information. Please follow the directions to create your username and " password.     Your access code is: [email or web address]  Expires: 2017  3:49 PM     Your access code will  in 90 days. If you need help or a new code, please call your [facility] or [phone].        Care EveryWhere ID     This is your Care EveryWhere ID. This could be used by other organizations to access your [place] medical records  [id number]        Your Vitals Were     Pulse Temperature                95 97.7  F (36.5  C) (Tympanic)           Blood Pressure from Last 3 Encounters:   10/04/17 124/65   17 157/75   17 152/63    Weight from Last 3 Encounters:   11/17/15 64.9 kg (143 lb)   10/12/15 63.5 kg (140 lb)   10/27/12 77.1 kg (170 lb)              We Performed the Following     Transfuse red blood cell unit     Transfuse red blood cell unit        Primary Care Provider Office Phone # Fax #    Abeba Merno Bradford [phone]       [facility] [address] [place] [address]        Equal Access to Services     [facility] : Hadii aad ku hadasho Soomaali, waaxda luqadaha, qaybta kaalmada adeegyada, zia serrato haylucy sheets . So [facility] [phone].    ATENCIÓN: Si habla español, tiene a joe disposición servicios gratuitos de asistencia lingüística. Llame al [phone].    We comply with applicable federal civil rights laws and [place] laws. We do not discriminate on the basis of race, color, national origin, age, disability, sex, sexual orientation, or gender identity.            Thank you!     Thank you for choosing [facility] INFUSION SERVICES  for your care. Our goal is always to provide you with excellent care. Hearing back from our patients is one way we can continue to improve our services. Please take a few minutes to complete the written survey that you may receive in the mail after your visit with us. Thank you!             Your Updated Medication List - Protect others around you: Learn how to safely use,  store and throw away your medicines at www.disposemymeds.org.          This list is accurate as of: 10/4/17  4:38 PM.  Always use your most recent med list.                   Brand Name Dispense Instructions for use Diagnosis    acetaminophen 325 MG tablet    TYLENOL    250 tablet    Take 2 tablets by mouth every 4 hours as needed.    OA (osteoarthritis)       B COMPLEX 1 PO      Take by mouth daily        calcium carb 1250 mg (500 mg [place])/vitamin D 200 units 500-200 MG-UNIT per tablet    OSCAL with D     Take 1 tablet by mouth 2 times daily (with meals).        cyanocobalamin 1000 MCG/ML injection    VITAMIN B12     Inject 1 mL into the muscle every 30 days        DARBEPOETIN KEVIN-POLYSORBATE IJ           deferoxamine      Inject 1,000 mg into the vein once With blood transfusions        EFFEXOR XR 75 MG 24 hr capsule   Generic drug:  venlafaxine      Take 75 mg by mouth daily.        HYDROMORPHONE HCL PO           losartan-hydrochlorothiazide 100-25 MG per tablet    HYZAAR     Take 1 tablet by mouth daily.        metoprolol 100 MG 24 hr tablet    TOPROL-XL     Take 100 mg by mouth daily.        omeprazole 20 MG CR capsule    priLOSEC     Take 40 mg by mouth daily        VITAMIN MIXTURE PO      Take 2 tablets by mouth daily Occuvite for eye health

## 2017-10-04 NOTE — PROGRESS NOTES
Infusion Nursing Note:  Josy Thomson presents today for 2U PRBC and Desferal..    Patient seen by provider today: No   present during visit today: Not Applicable.    Note: NA    Intravenous Access:  Peripheral IV placed X2--1 for blood and 1 for Desferal.    Treatment Conditions:  Blood transfusion consent signed 10/14/17.  Hgb 4.7 on 10/2/17 at [facility].      Post Infusion Assessment:  Patient tolerated infusion without incident.  Blood return noted pre and post infusion.  Site patent and intact, free from redness, edema or discomfort.  No evidence of extravasations.  Access discontinued per protocol.    Discharge Plan:   Discharge instructions reviewed with: Patient and daughter.  Patient discharged in stable condition accompanied by: daughter.  Departure Mode: Wheelchair.    Wanda Darnell RN

## 2017-10-20 NOTE — PROGRESS NOTES
Infusion Nursing Note:  Josy Thomson presents today for 2U PRBC and Desferral.    Patient seen by provider today: No   present during visit today: Not Applicable.    Note: N/A.    Intravenous Access:  Peripheral IV placed X2    Treatment Conditions:  Blood transfusion consent signed 10/20/17.  Hgb 4.7 on 10/16/17 at [facility].      Post Infusion Assessment:  Patient tolerated infusion without incident.  Blood return noted pre and post infusion.  Site patent and intact, free from redness, edema or discomfort.  No evidence of extravasations.  Access discontinued per protocol.    Discharge Plan:   Discharge instructions reviewed with: Patient and daughters.  Patient and/or family verbalized understanding of discharge instructions and all questions answered.  Patient discharged in stable condition accompanied by: daughter.  Departure Mode: Wheelchair.    Wanda Darnell RN

## 2017-10-20 NOTE — MR AVS SNAPSHOT
"              After Visit Summary   10/20/2017    Josy Thomson    MRN: [id number]           Patient Information     Date Of Birth          12/12/1927        Visit Information        Provider Department      10/20/2017 12:30 PM RH INFUSION CHAIR 11 [facility] Infusion Services        Today's Diagnoses     MDS (myelodysplastic syndrome) (H)    -  1    Anemia           Follow-ups after your visit        Future tests that were ordered for you today     Open Standing Orders        Priority Remaining Interval Expires Ordered    Red blood cell prepare order unit Routine 99/100 CONDITIONAL (SPECIFY) BLOOD  10/17/2017            Who to contact     If you have questions or need follow up information about today's clinic visit or your schedule please contact  INFUSION SERVICES directly at [phone].  Normal or non-critical lab and imaging results will be communicated to you by Aspen Aerogelshart, letter or phone within 4 business days after the clinic has received the results. If you do not hear from us within 7 days, please contact the clinic through XL Hybridst or phone. If you have a critical or abnormal lab result, we will notify you by phone as soon as possible.  Submit refill requests through paymio or call your pharmacy and they will forward the refill request to us. Please allow 3 business days for your refill to be completed.          Additional Information About Your Visit        Aspen AerogelsharStunable Information     paymio lets you send messages to your doctor, view your test results, renew your prescriptions, schedule appointments and more. To sign up, go to [email or web address] . Click on \"Log in\" on the left side of the screen, which will take you to the Welcome page. Then click on \"Sign up Now\" on the right side of the page.     You will be asked to enter the access code listed below, as well as some personal information. Please follow the directions to create your username and " password.     Your access code is: [email or web address]  Expires: 2017  3:49 PM     Your access code will  in 90 days. If you need help or a new code, please call your [facility] or [phone].        Care EveryWhere ID     This is your Care EveryWhere ID. This could be used by other organizations to access your [place] medical records  [id number]        Your Vitals Were     Pulse Temperature Respirations             86 97.7  F (36.5  C) (Tympanic) 16          Blood Pressure from Last 3 Encounters:   10/20/17 128/55   10/04/17 124/65   17 157/75    Weight from Last 3 Encounters:   11/17/15 64.9 kg (143 lb)   10/12/15 63.5 kg (140 lb)   10/27/12 77.1 kg (170 lb)              We Performed the Following     Transfuse red blood cell unit     Transfuse red blood cell unit        Primary Care Provider Office Phone # Fax #    Abeba Meron Bradford [phone]       [facility] [address] [place] [address]        Equal Access to Services     [facility]: Hadii aad ku hadasho Soomaali, waaxda luqadaha, qaybta kaalmada adeegyada, zia serrato hayhienn otoniel sheets . So [facility] [phone].    ATENCIÓN: Si habla español, tiene a joe disposición servicios gratuitos de asistencia lingüística. Llame al [phone].    We comply with applicable federal civil rights laws and [place] laws. We do not discriminate on the basis of race, color, national origin, age, disability, sex, sexual orientation, or gender identity.            Thank you!     Thank you for choosing [facility] INFUSION SERVICES  for your care. Our goal is always to provide you with excellent care. Hearing back from our patients is one way we can continue to improve our services. Please take a few minutes to complete the written survey that you may receive in the mail after your visit with us. Thank you!             Your Updated Medication List - Protect others around you: Learn how to  safely use, store and throw away your medicines at www.disposemymeds.org.          This list is accurate as of: 10/20/17  4:32 PM.  Always use your most recent med list.                   Brand Name Dispense Instructions for use Diagnosis    acetaminophen 325 MG tablet    TYLENOL    250 tablet    Take 2 tablets by mouth every 4 hours as needed.    OA (osteoarthritis)       B COMPLEX 1 PO      Take by mouth daily        calcium carb 1250 mg (500 mg [place])/vitamin D 200 units 500-200 MG-UNIT per tablet    OSCAL with D     Take 1 tablet by mouth 2 times daily (with meals).        cyanocobalamin 1000 MCG/ML injection    VITAMIN B12     Inject 1 mL into the muscle every 30 days        DARBEPOETIN KEVIN-POLYSORBATE IJ           deferoxamine      Inject 1,000 mg into the vein once With blood transfusions        EFFEXOR XR 75 MG 24 hr capsule   Generic drug:  venlafaxine      Take 75 mg by mouth daily.        HYDROMORPHONE HCL PO           losartan-hydrochlorothiazide 100-25 MG per tablet    HYZAAR     Take 1 tablet by mouth daily.        metoprolol 100 MG 24 hr tablet    TOPROL-XL     Take 100 mg by mouth daily.        omeprazole 20 MG CR capsule    priLOSEC     Take 40 mg by mouth daily        VITAMIN MIXTURE PO      Take 2 tablets by mouth daily Occuvite for eye health

## 2017-11-03 NOTE — PROGRESS NOTES
Infusion Nursing Note:  Josy Thomson presents today for blood transfusion  Patient seen by provider today: No   present during visit today: Not Applicable.    Note: N/A.    Intravenous Access:  Peripheral IV placed.    Treatment Conditions:  Lab Results   Component Value Date    HGB 6.3 10/12/2015     Lab Results   Component Value Date    WBC 2.5 10/12/2015      Lab Results   Component Value Date    ANEU 1.3 10/12/2015     Lab Results   Component Value Date     10/12/2015      Results reviewed, labs MET treatment parameters, ok to proceed with treatment.        Post Infusion Assessment:  Patient tolerated infusion without incident.  Site patent and intact, free from redness, edema or discomfort.  No evidence of extravasations.  Access discontinued per protocol.    Discharge Plan:   Discharge instructions reviewed with: Patient and Family.  Patient and/or family verbalized understanding of discharge instructions and all questions answered.  Copy of AVS reviewed with patient and/or family.  Patient will return prn for next appointment.  Patient discharged in stable condition accompanied by: daughter.  Departure Mode: Wheelchair.    Alyssa Riddle RN

## 2017-11-03 NOTE — MR AVS SNAPSHOT
"              After Visit Summary   11/3/2017    Josy Thomson    MRN: [id number]           Patient Information     Date Of Birth          1927        Visit Information        Provider Department      11/3/2017 10:30 AM  INFUSION CHAIR 9 [facility]        Today's Diagnoses     MDS (myelodysplastic syndrome) (H)    -  1    Anemia           Follow-ups after your visit        Who to contact     If you have questions or need follow up information about today's clinic visit or your schedule please contact [facility] directly at [phone].  Normal or non-critical lab and imaging results will be communicated to you by Pecabuhart, letter or phone within 4 business days after the clinic has received the results. If you do not hear from us within 7 days, please contact the clinic through Soluble Systemst or phone. If you have a critical or abnormal lab result, we will notify you by phone as soon as possible.  Submit refill requests through Honey or call your pharmacy and they will forward the refill request to us. Please allow 3 business days for your refill to be completed.          Additional Information About Your Visit        MyChart Information     Honey lets you send messages to your doctor, view your test results, renew your prescriptions, schedule appointments and more. To sign up, go to [email or web address] . Click on \"Log in\" on the left side of the screen, which will take you to the Welcome page. Then click on \"Sign up Now\" on the right side of the page.     You will be asked to enter the access code listed below, as well as some personal information. Please follow the directions to create your username and password.     Your access code is: [email or web address]  Expires: 2017  3:49 PM     Your access code will  in 90 days. If you need help or a new code, please call your [place] clinic or [phone].        Care EveryWhere ID     This " is your Care EveryWhere ID. This could be used by other organizations to access your [place] medical records  [id number]        Your Vitals Were     Pulse Temperature Respirations             85 98.2  F (36.8  C) (Oral) 20          Blood Pressure from Last 3 Encounters:   11/03/17 123/61   10/20/17 128/55   10/04/17 124/65    Weight from Last 3 Encounters:   11/17/15 64.9 kg (143 lb)   10/12/15 63.5 kg (140 lb)   10/27/12 77.1 kg (170 lb)              We Performed the Following     ABO/Rh type and screen     Blood component     Blood component     Transfuse red blood cell unit     Transfuse red blood cell unit        Primary Care Provider Office Phone # Fax #    Abeba Bradford [phone]       [facility] [address] [place] [address]        Equal Access to Services     [facility]: Hadii aad ku hadasho Soomaali, waaxda luqadaha, qaybta kaalmada adeegyada, waxay idiin hayaan otoniel sheets . So [facility] [phone].    ATENCIÓN: Si habla español, tiene a joe disposición servicios gratuitos de asistencia lingüística. Llame al [phone].    We comply with applicable federal civil rights laws and [place] laws. We do not discriminate on the basis of race, color, national origin, age, disability, sex, sexual orientation, or gender identity.            Thank you!     Thank you for choosing [facility] INFUSION CENTER  for your care. Our goal is always to provide you with excellent care. Hearing back from our patients is one way we can continue to improve our services. Please take a few minutes to complete the written survey that you may receive in the mail after your visit with us. Thank you!             Your Updated Medication List - Protect others around you: Learn how to safely use, store and throw away your medicines at www.disposemymeds.org.          This list is accurate as of: 11/3/17  4:13 PM.  Always use your most recent med list.                    Brand Name Dispense Instructions for use Diagnosis    acetaminophen 325 MG tablet    TYLENOL    250 tablet    Take 2 tablets by mouth every 4 hours as needed.    OA (osteoarthritis)       B COMPLEX 1 PO      Take by mouth daily        Calcium carb-Vitamin D 500 mg [place]-200 units 500-200 MG-UNIT per tablet    OSCAL with D;Oyster Shell Calcium     Take 1 tablet by mouth 2 times daily (with meals).        cyanocobalamin 1000 MCG/ML injection    VITAMIN B12     Inject 1 mL into the muscle every 30 days        DARBEPOETIN KEVIN-POLYSORBATE IJ           deferoxamine      Inject 1,000 mg into the vein once With blood transfusions        EFFEXOR XR 75 MG 24 hr capsule   Generic drug:  venlafaxine      Take 75 mg by mouth daily.        HYDROMORPHONE HCL PO           losartan-hydrochlorothiazide 100-25 MG per tablet    HYZAAR     Take 1 tablet by mouth daily.        metoprolol 100 MG 24 hr tablet    TOPROL-XL     Take 100 mg by mouth daily.        omeprazole 20 MG CR capsule    priLOSEC     Take 40 mg by mouth daily        VITAMIN MIXTURE PO      Take 2 tablets by mouth daily Occuvite for eye health

## 2017-11-28 NOTE — PLAN OF CARE
Problem: Patient Care Overview  Goal: Plan of Care/Patient Progress Review  Outcome: Improving  Pt is A+O x 4. Very [place]. VSS on RA. Completed 1 unit PRBCs without reactions. Second unit PRBCs is currently infusing, no reaction, will cont to monitor.  Desferal infused. Tolerated regular diet. Daughter Jazmine is at bedside. Plan to d/c home tonight.

## 2017-12-13 NOTE — MR AVS SNAPSHOT
After Visit Summary   12/13/2017    Josy Thomson    MRN: [id number]           Patient Information     Date Of Birth          12/12/1927        Visit Information        Provider Department      12/13/2017 12:30 PM RH INFUSION CHAIR 10 [facility] Infusion Services        Today's Diagnoses     MDS (myelodysplastic syndrome) (H)    -  1    Anemia           Follow-ups after your visit        Your next 10 appointments already scheduled     Dec 29, 2017 12:30 PM CST   Level 4 with RH INFUSION CHAIR 6   [facility] Infusion [facility] ([facility])    [facility]  [address]  [facility] [address]   [phone]              Future tests that were ordered for you today     Open Standing Orders        Priority Remaining Interval Expires Ordered    Red blood cell prepare order unit Routine 99/100 CONDITIONAL (SPECIFY) BLOOD  12/12/2017            Who to contact     If you have questions or need follow up information about today's clinic visit or your schedule please contact [facility] INFUSION SERVICES directly at [phone].  Normal or non-critical lab and imaging results will be communicated to you by Hopscotchhart, letter or phone within 4 business days after the clinic has received the results. If you do not hear from us within 7 days, please contact the clinic through SensorTecht or phone. If you have a critical or abnormal lab result, we will notify you by phone as soon as possible.  Submit refill requests through Rolith or call your pharmacy and they will forward the refill request to us. Please allow 3 business days for your refill to be completed.          Additional Information About Your Visit        Hopscotchhart Information     Rolith lets you send messages to your doctor, view your test results, renew your prescriptions, schedule appointments and more. To sign up, go to [email or web address] . Click on  "\"Log in\" on the left side of the screen, which will take you to the Welcome page. Then click on \"Sign up Now\" on the right side of the page.     You will be asked to enter the access code listed below, as well as some personal information. Please follow the directions to create your username and password.     Your access code is: [email or web address]  Expires: 3/13/2018  4:02 PM     Your access code will  in 90 days. If you need help or a new code, please call your [place] clinic or [phone].        Care EveryWhere ID     This is your Care EveryWhere ID. This could be used by other organizations to access your [place] medical records  [id number]        Your Vitals Were     Pulse Temperature Respirations Pulse Oximetry          90 97.5  F (36.4  C) (Tympanic) 16 95%         Blood Pressure from Last 3 Encounters:   17 131/82   17 171/60   17 123/61    Weight from Last 3 Encounters:   17 62.7 kg (138 lb 4.8 oz)   11/17/15 64.9 kg (143 lb)   10/12/15 63.5 kg (140 lb)              We Performed the Following     Transfuse red blood cell unit     Transfuse red blood cell unit        Primary Care Provider Office Phone # Fax #    Abeba Bradford [phone]       [facility] [address] [facility] [address]        Equal Access to Services     [facility]: Hadii aad ku hadasho Soomaali, waaxda luqadaha, qaybta kaalmada adeegyada, zia bello aderosalind sheets . So [facility] [phone].    ATENCIÓN: Si teresola katina, tiene a joe disposición servicios gratuitos de asistencia lingüística. Lexi al [phone].    We comply with applicable federal civil rights laws and [place] laws. We do not discriminate on the basis of race, color, national origin, age, disability, sex, sexual orientation, or gender identity.            Thank you!     Thank you for choosing [facility] INFUSION SERVICES  for your care. Our goal is always to provide you " with excellent care. Hearing back from our patients is one way we can continue to improve our services. Please take a few minutes to complete the written survey that you may receive in the mail after your visit with us. Thank you!             Your Updated Medication List - Protect others around you: Learn how to safely use, store and throw away your medicines at www.disposemymeds.org.          This list is accurate as of: 12/13/17  4:02 PM.  Always use your most recent med list.                   Brand Name Dispense Instructions for use Diagnosis    acetaminophen 325 MG tablet    TYLENOL    250 tablet    Take 2 tablets by mouth every 4 hours as needed.    OA (osteoarthritis)       B COMPLEX 1 PO      Take by mouth daily        Calcium carb-Vitamin D 500 mg [place]-200 units 500-200 MG-UNIT per tablet    OSCAL with D;Oyster Shell Calcium     Take 1 tablet by mouth 2 times daily (with meals).        cyanocobalamin 1000 MCG/ML injection    VITAMIN B12     Inject 1 mL into the muscle every 30 days        DARBEPOETIN KEVIN-POLYSORBATE IJ           deferoxamine      Inject 1,000 mg into the vein once With blood transfusions        EFFEXOR XR 75 MG 24 hr capsule   Generic drug:  venlafaxine      Take 75 mg by mouth daily.        HYDROMORPHONE HCL PO           losartan-hydrochlorothiazide 100-25 MG per tablet    HYZAAR     Take 1 tablet by mouth daily.        metoprolol 100 MG 24 hr tablet    TOPROL-XL     Take 100 mg by mouth daily.        omeprazole 20 MG CR capsule    priLOSEC     Take 40 mg by mouth daily        VITAMIN MIXTURE PO      Take 2 tablets by mouth daily Occuvite for eye health

## 2017-12-13 NOTE — PROGRESS NOTES
Infusion Nursing Note:  Josy Thomson presents today for 2 units packed red blood cells and 1 gm Desferral.    Patient seen by provider today: No   present during visit today: Not Applicable.    Note: Blood transfused at 250 ml/hr as in previous infusions at [facility]. Desferral given over 2 hours.    Intravenous Access:  Peripheral IV placed X 2    Treatment Conditions:  Blood transfusion consent signed 10/20/17.  Hgb 4.5 on 12/12/17      Post Infusion Assessment:  Patient tolerated infusion without incident.  Blood return noted pre and post infusion.  Site patent and intact, free from redness, edema or discomfort.  No evidence of extravasations.  Access discontinued per protocol.    Discharge Plan:   Copy of AVS reviewed with patient and/or family.  Patient will return 12/29/17 for next appointment.    Wanda Lombardi RN

## 2017-12-29 NOTE — MR AVS SNAPSHOT
After Visit Summary   12/29/2017    Josy Thomson    MRN: [id number]           Patient Information     Date Of Birth          12/12/1927        Visit Information        Provider Department      12/29/2017 12:30 PM RH INFUSION CHAIR 6 [facility] Infusion Services        Today's Diagnoses     MDS (myelodysplastic syndrome) (H)    -  1    Anemia           Follow-ups after your visit        Your next 10 appointments already scheduled     Uday 10, 2018 12:00 PM CST   Level 4 with RH INFUSION CHAIR 10   [facility] Infusion Services ([facility])    [facility] [place]  [address]  [address]  [place] [address]   [phone]              Future tests that were ordered for you today     Open Standing Orders        Priority Remaining Interval Expires Ordered    Red blood cell prepare order unit Routine 99/100 CONDITIONAL (SPECIFY) BLOOD  12/28/2017            Who to contact     If you have questions or need follow up information about today's clinic visit or your schedule please contact [facility] INFUSION SERVICES directly at [phone].  Normal or non-critical lab and imaging results will be communicated to you by Leapfrog Onlinehart, letter or phone within 4 business days after the clinic has received the results. If you do not hear from us within 7 days, please contact the clinic through Fetch Technologiest or phone. If you have a critical or abnormal lab result, we will notify you by phone as soon as possible.  Submit refill requests through wali or call your pharmacy and they will forward the refill request to us. Please allow 3 business days for your refill to be completed.          Additional Information About Your Visit        Leapfrog Onlinehart Information     wali lets you send messages to your doctor, view your test results, renew your prescriptions, schedule appointments and more. To sign up, go to [email or web address] . Click on  "\"Log in\" on the left side of the screen, which will take you to the Welcome page. Then click on \"Sign up Now\" on the right side of the page.     You will be asked to enter the access code listed below, as well as some personal information. Please follow the directions to create your username and password.     Your access code is: [email or web address]  Expires: 3/13/2018  4:02 PM     Your access code will  in 90 days. If you need help or a new code, please call your [place] clinic or [phone].        Care EveryWhere ID     This is your Care EveryWhere ID. This could be used by other organizations to access your [facility] medical records  [id number]        Your Vitals Were     Pulse Temperature Pulse Oximetry             91 98  F (36.7  C) 93%          Blood Pressure from Last 3 Encounters:   17 145/69   17 131/82   17 171/60    Weight from Last 3 Encounters:   17 62.7 kg (138 lb 4.8 oz)   11/17/15 64.9 kg (143 lb)   10/12/15 63.5 kg (140 lb)              We Performed the Following     Transfuse red blood cell unit     Transfuse red blood cell unit        Primary Care Provider Office Phone # Fax #    Abeba Bradford [phone]       [facility] [address] [place] [address]        Equal Access to Services     [facility]: Hadii aad ku hadasho Soomaali, waaxda luqadaha, qaybta kaalmada adeegyada, waxay ama sheets . So [facility] [phone].    ATENCIÓN: Si habla español, tiene a joe disposición servicios gratuitos de asistencia lingüística. Llame al [phone].    We comply with applicable federal civil rights laws and [place] laws. We do not discriminate on the basis of race, color, national origin, age, disability, sex, sexual orientation, or gender identity.            Thank you!     Thank you for choosing [facility] INFUSION SERVICES  for your care. Our goal is always to provide you with excellent care. " Hearing back from our patients is one way we can continue to improve our services. Please take a few minutes to complete the written survey that you may receive in the mail after your visit with us. Thank you!             Your Updated Medication List - Protect others around you: Learn how to safely use, store and throw away your medicines at www.disposemymeds.org.          This list is accurate as of: 12/29/17  4:20 PM.  Always use your most recent med list.                   Brand Name Dispense Instructions for use Diagnosis    acetaminophen 325 MG tablet    TYLENOL    250 tablet    Take 2 tablets by mouth every 4 hours as needed.    OA (osteoarthritis)       B COMPLEX 1 PO      Take by mouth daily        Calcium carb-Vitamin D 500 mg [place]-200 units 500-200 MG-UNIT per tablet    OSCAL with D;Oyster Shell Calcium     Take 1 tablet by mouth 2 times daily (with meals).        cyanocobalamin 1000 MCG/ML injection    VITAMIN B12     Inject 1 mL into the muscle every 30 days        DARBEPOETIN KEVIN-POLYSORBATE IJ           deferoxamine      Inject 1,000 mg into the vein once With blood transfusions        EFFEXOR XR 75 MG 24 hr capsule   Generic drug:  venlafaxine      Take 75 mg by mouth daily.        HYDROMORPHONE HCL PO           losartan-hydrochlorothiazide 100-25 MG per tablet    HYZAAR     Take 1 tablet by mouth daily.        metoprolol 100 MG 24 hr tablet    TOPROL-XL     Take 100 mg by mouth daily.        omeprazole 20 MG CR capsule    priLOSEC     Take 40 mg by mouth daily        VITAMIN MIXTURE PO      Take 2 tablets by mouth daily Occuvite for eye health

## 2017-12-29 NOTE — PROGRESS NOTES
Infusion Nursing Note:  Josy Thomson presents today for 2 units PRBC's and Desferal.    Patient seen by provider today: No   present during visit today: Not Applicable.    Note: Has c/o increased fatigue and slight SOB with exertion.    Intravenous Access:  Peripheral IV placed.    Treatment Conditions:  Blood transfusion consent signed 10/20/17.      Post Infusion Assessment:  Patient tolerated infusion without incident.  Site patent and intact, free from redness, edema or discomfort.  No evidence of extravasations.  Access discontinued per protocol.    Discharge Plan:   Discharge instructions reviewed with: Patient and Family.  Patient discharged in stable condition accompanied by: daughter.  Departure Mode: Wheelchair.    FRANK DOAN RN

## 2018-01-01 ENCOUNTER — APPOINTMENT (OUTPATIENT)
Dept: PHYSICAL THERAPY | Facility: CLINIC | Age: 83
DRG: 640 | End: 2018-01-01
Payer: COMMERCIAL

## 2018-01-01 ENCOUNTER — APPOINTMENT (OUTPATIENT)
Dept: GENERAL RADIOLOGY | Facility: CLINIC | Age: 83
DRG: 871 | End: 2018-01-01
Attending: EMERGENCY MEDICINE
Payer: COMMERCIAL

## 2018-01-01 ENCOUNTER — APPOINTMENT (OUTPATIENT)
Dept: GENERAL RADIOLOGY | Facility: CLINIC | Age: 83
DRG: 640 | End: 2018-01-01
Attending: HOSPITALIST
Payer: COMMERCIAL

## 2018-01-01 ENCOUNTER — TRANSFERRED RECORDS (OUTPATIENT)
Dept: HEALTH INFORMATION MANAGEMENT | Facility: CLINIC | Age: 83
End: 2018-01-01

## 2018-01-01 ENCOUNTER — APPOINTMENT (OUTPATIENT)
Dept: CT IMAGING | Facility: CLINIC | Age: 83
DRG: 871 | End: 2018-01-01
Attending: EMERGENCY MEDICINE
Payer: COMMERCIAL

## 2018-01-01 ENCOUNTER — APPOINTMENT (OUTPATIENT)
Dept: CT IMAGING | Facility: CLINIC | Age: 83
DRG: 640 | End: 2018-01-01
Attending: EMERGENCY MEDICINE
Payer: COMMERCIAL

## 2018-01-01 ENCOUNTER — HOSPITAL ENCOUNTER (OUTPATIENT)
Dept: LAB | Facility: CLINIC | Age: 83
Discharge: HOME | End: 2018-01-22
Attending: INTERNAL MEDICINE | Admitting: INTERNAL MEDICINE
Payer: COMMERCIAL

## 2018-01-01 ENCOUNTER — HOSPITAL ENCOUNTER (OUTPATIENT)
Dept: LAB | Facility: CLINIC | Age: 83
Discharge: HOME | End: 2018-01-08
Attending: INTERNAL MEDICINE | Admitting: INTERNAL MEDICINE
Payer: COMMERCIAL

## 2018-01-01 ENCOUNTER — INFUSION THERAPY VISIT (OUTPATIENT)
Dept: INFUSION THERAPY | Facility: CLINIC | Age: 83
End: 2018-01-01
Attending: INTERNAL MEDICINE
Payer: COMMERCIAL

## 2018-01-01 ENCOUNTER — HOSPITAL LABORATORY (OUTPATIENT)
Dept: OTHER | Facility: CLINIC | Age: 83
End: 2018-01-01

## 2018-01-01 ENCOUNTER — HOSPITAL ENCOUNTER (INPATIENT)
Facility: CLINIC | Age: 83
LOS: 6 days | Discharge: NURSING, REHAB OR LONG-TERM CARE | DRG: 640 | End: 2018-02-24
Attending: EMERGENCY MEDICINE | Admitting: HOSPITALIST
Payer: COMMERCIAL

## 2018-01-01 ENCOUNTER — HOSPITAL ENCOUNTER (INPATIENT)
Facility: CLINIC | Age: 83
LOS: 3 days | Discharge: HOSPICE | DRG: 871 | End: 2018-03-06
Attending: EMERGENCY MEDICINE | Admitting: INTERNAL MEDICINE
Payer: COMMERCIAL

## 2018-01-01 ENCOUNTER — APPOINTMENT (OUTPATIENT)
Dept: PHYSICAL THERAPY | Facility: CLINIC | Age: 83
DRG: 640 | End: 2018-01-01
Attending: PHYSICIAN ASSISTANT
Payer: COMMERCIAL

## 2018-01-01 ENCOUNTER — HOSPITAL ENCOUNTER (OUTPATIENT)
Dept: LAB | Facility: CLINIC | Age: 83
Discharge: HOME | End: 2018-02-05
Attending: INTERNAL MEDICINE | Admitting: INTERNAL MEDICINE
Payer: COMMERCIAL

## 2018-01-01 ENCOUNTER — DOCUMENTATION ONLY (OUTPATIENT)
Dept: OTHER | Facility: CLINIC | Age: 83
End: 2018-01-01

## 2018-01-01 VITALS
TEMPERATURE: 97.6 F | DIASTOLIC BLOOD PRESSURE: 72 MMHG | SYSTOLIC BLOOD PRESSURE: 143 MMHG | RESPIRATION RATE: 18 BRPM | HEART RATE: 82 BPM

## 2018-01-01 VITALS
OXYGEN SATURATION: 94 % | DIASTOLIC BLOOD PRESSURE: 80 MMHG | TEMPERATURE: 98.1 F | SYSTOLIC BLOOD PRESSURE: 160 MMHG | RESPIRATION RATE: 18 BRPM

## 2018-01-01 VITALS
OXYGEN SATURATION: 97 % | RESPIRATION RATE: 16 BRPM | WEIGHT: 131.7 LBS | SYSTOLIC BLOOD PRESSURE: 177 MMHG | DIASTOLIC BLOOD PRESSURE: 77 MMHG | HEART RATE: 111 BPM | BODY MASS INDEX: 26.6 KG/M2 | TEMPERATURE: 96.3 F

## 2018-01-01 VITALS
BODY MASS INDEX: 25.28 KG/M2 | HEART RATE: 98 BPM | HEIGHT: 59 IN | RESPIRATION RATE: 18 BRPM | DIASTOLIC BLOOD PRESSURE: 52 MMHG | WEIGHT: 125.4 LBS | SYSTOLIC BLOOD PRESSURE: 140 MMHG | OXYGEN SATURATION: 93 % | TEMPERATURE: 98.6 F

## 2018-01-01 VITALS
TEMPERATURE: 97.9 F | HEART RATE: 92 BPM | OXYGEN SATURATION: 99 % | RESPIRATION RATE: 16 BRPM | SYSTOLIC BLOOD PRESSURE: 132 MMHG | DIASTOLIC BLOOD PRESSURE: 59 MMHG

## 2018-01-01 DIAGNOSIS — E86.0 DEHYDRATION: ICD-10-CM

## 2018-01-01 DIAGNOSIS — J18.9 PNEUMONIA OF BOTH LOWER LOBES DUE TO INFECTIOUS ORGANISM: ICD-10-CM

## 2018-01-01 DIAGNOSIS — D64.9 ANEMIA, UNSPECIFIED TYPE: ICD-10-CM

## 2018-01-01 DIAGNOSIS — N17.9 ACUTE RENAL FAILURE, UNSPECIFIED ACUTE RENAL FAILURE TYPE (H): ICD-10-CM

## 2018-01-01 DIAGNOSIS — Z71.89 ADVANCED DIRECTIVES, COUNSELING/DISCUSSION: Chronic | ICD-10-CM

## 2018-01-01 DIAGNOSIS — D46.9 MDS (MYELODYSPLASTIC SYNDROME) (H): Primary | ICD-10-CM

## 2018-01-01 DIAGNOSIS — R41.0 CONFUSION: ICD-10-CM

## 2018-01-01 DIAGNOSIS — R41.0 ACUTE DELIRIUM: ICD-10-CM

## 2018-01-01 DIAGNOSIS — D64.9 ANEMIA: ICD-10-CM

## 2018-01-01 DIAGNOSIS — Z51.5 END OF LIFE CARE: Primary | ICD-10-CM

## 2018-01-01 DIAGNOSIS — D46.9 MDS (MYELODYSPLASTIC SYNDROME) (H): ICD-10-CM

## 2018-01-01 DIAGNOSIS — R65.20 SEVERE SEPSIS (H): ICD-10-CM

## 2018-01-01 DIAGNOSIS — D61.818 PANCYTOPENIA (H): ICD-10-CM

## 2018-01-01 DIAGNOSIS — A41.9 SEVERE SEPSIS (H): ICD-10-CM

## 2018-01-01 LAB
ABO + RH BLD: NORMAL
ACANTHOCYTES BLD QL SMEAR: ABNORMAL
ALBUMIN SERPL-MCNC: 1.7 G/DL (ref 3.4–5)
ALBUMIN SERPL-MCNC: 3.3 G/DL (ref 3.4–5)
ALBUMIN UR-MCNC: 10 MG/DL
ALBUMIN UR-MCNC: NEGATIVE MG/DL
ALBUMIN UR-MCNC: NEGATIVE MG/DL
ALP SERPL-CCNC: 101 U/L (ref 40–150)
ALP SERPL-CCNC: 152 U/L (ref 40–150)
ALT SERPL W P-5'-P-CCNC: 349 U/L (ref 0–50)
ALT SERPL W P-5'-P-CCNC: 38 U/L (ref 0–50)
AMORPH CRY #/AREA URNS HPF: ABNORMAL /HPF
ANION GAP SERPL CALCULATED.3IONS-SCNC: 10 MMOL/L (ref 3–14)
ANION GAP SERPL CALCULATED.3IONS-SCNC: 10 MMOL/L (ref 6–17)
ANION GAP SERPL CALCULATED.3IONS-SCNC: 2 MMOL/L (ref 3–14)
ANION GAP SERPL CALCULATED.3IONS-SCNC: 3 MMOL/L (ref 3–14)
ANION GAP SERPL CALCULATED.3IONS-SCNC: 4 MMOL/L (ref 3–14)
ANION GAP SERPL CALCULATED.3IONS-SCNC: 4 MMOL/L (ref 3–14)
ANION GAP SERPL CALCULATED.3IONS-SCNC: 5 MMOL/L (ref 3–14)
ANION GAP SERPL CALCULATED.3IONS-SCNC: 5 MMOL/L (ref 3–14)
ANION GAP SERPL CALCULATED.3IONS-SCNC: 6 MMOL/L (ref 3–14)
ANION GAP SERPL CALCULATED.3IONS-SCNC: 7 MMOL/L (ref 3–14)
ANION GAP SERPL CALCULATED.3IONS-SCNC: 7 MMOL/L (ref 3–14)
ANION GAP SERPL CALCULATED.3IONS-SCNC: 9 MMOL/L (ref 3–14)
ANION GAP SERPL CALCULATED.3IONS-SCNC: 9 MMOL/L (ref 3–14)
ANISOCYTOSIS BLD QL SMEAR: SLIGHT
ANISOCYTOSIS BLD QL SMEAR: SLIGHT
APPEARANCE UR: ABNORMAL
APPEARANCE UR: CLEAR
APPEARANCE UR: CLEAR
AST SERPL W P-5'-P-CCNC: 258 U/L (ref 0–45)
AST SERPL W P-5'-P-CCNC: 27 U/L (ref 0–45)
BACTERIA #/AREA URNS HPF: ABNORMAL /HPF
BACTERIA #/AREA URNS HPF: ABNORMAL /HPF
BACTERIA SPEC CULT: NO GROWTH
BACTERIA SPEC CULT: NORMAL
BACTERIA SPEC CULT: NORMAL
BASOPHILS # BLD AUTO: 0 10E9/L (ref 0–0.2)
BASOPHILS # BLD AUTO: 0.1 10E9/L (ref 0–0.2)
BASOPHILS NFR BLD AUTO: 0 %
BASOPHILS NFR BLD AUTO: 1 %
BASOPHILS NFR BLD AUTO: 1.9 %
BASOPHILS NFR BLD AUTO: 2 %
BASOPHILS NFR BLD AUTO: 3 %
BASOPHILS NFR BLD AUTO: 6 %
BILIRUB SERPL-MCNC: 0.9 MG/DL (ref 0.2–1.3)
BILIRUB SERPL-MCNC: 1 MG/DL (ref 0.2–1.3)
BILIRUB UR QL STRIP: NEGATIVE
BLD GP AB SCN SERPL QL: NORMAL
BLD PROD TYP BPU: NORMAL
BLD UNIT ID BPU: 0
BLOOD BANK CMNT PATIENT-IMP: NORMAL
BLOOD PRODUCT CODE: NORMAL
BPU ID: NORMAL
BUN SERPL-MCNC: 16 MG/DL (ref 7–30)
BUN SERPL-MCNC: 17 MG/DL (ref 7–30)
BUN SERPL-MCNC: 20 MG/DL (ref 7–30)
BUN SERPL-MCNC: 23 MG/DL (ref 7–30)
BUN SERPL-MCNC: 27 MG/DL (ref 7–30)
BUN SERPL-MCNC: 30 MG/DL (ref 7–30)
BUN SERPL-MCNC: 35 MG/DL (ref 7–30)
BUN SERPL-MCNC: 65 MG/DL (ref 7–30)
BUN SERPL-MCNC: 75 MG/DL (ref 7–30)
BUN SERPL-MCNC: 86 MG/DL (ref 7–30)
BUN SERPL-MCNC: 86 MG/DL (ref 7–30)
BUN SERPL-MCNC: 87 MG/DL (ref 7–30)
BUN SERPL-MCNC: 88 MG/DL (ref 7–30)
C COLI+JEJUNI+LARI FUSA STL QL NAA+PROBE: NOT DETECTED
C DIFF TOX B STL QL: NEGATIVE
CA-I BLD-SCNC: 4.4 MG/DL (ref 4.4–5.2)
CALCIUM SERPL-MCNC: 8.2 MG/DL (ref 8.5–10.1)
CALCIUM SERPL-MCNC: 8.3 MG/DL (ref 8.5–10.1)
CALCIUM SERPL-MCNC: 8.3 MG/DL (ref 8.5–10.1)
CALCIUM SERPL-MCNC: 8.5 MG/DL (ref 8.5–10.1)
CALCIUM SERPL-MCNC: 8.6 MG/DL (ref 8.5–10.1)
CALCIUM SERPL-MCNC: 8.7 MG/DL (ref 8.5–10.1)
CALCIUM SERPL-MCNC: 8.9 MG/DL (ref 8.5–10.1)
CALCIUM SERPL-MCNC: 8.9 MG/DL (ref 8.5–10.1)
CALCIUM SERPL-MCNC: 9 MG/DL (ref 8.5–10.1)
CHLORIDE BLD-SCNC: 99 MMOL/L (ref 94–109)
CHLORIDE SERPL-SCNC: 100 MMOL/L (ref 94–109)
CHLORIDE SERPL-SCNC: 101 MMOL/L (ref 94–109)
CHLORIDE SERPL-SCNC: 103 MMOL/L (ref 94–109)
CHLORIDE SERPL-SCNC: 104 MMOL/L (ref 94–109)
CHLORIDE SERPL-SCNC: 105 MMOL/L (ref 94–109)
CHLORIDE SERPL-SCNC: 106 MMOL/L (ref 94–109)
CHLORIDE SERPL-SCNC: 107 MMOL/L (ref 94–109)
CHLORIDE SERPL-SCNC: 108 MMOL/L (ref 94–109)
CHLORIDE SERPL-SCNC: 113 MMOL/L (ref 94–109)
CHLORIDE SERPL-SCNC: 118 MMOL/L (ref 94–109)
CO2 BLD-SCNC: 27 MMOL/L (ref 20–32)
CO2 BLDCOV-SCNC: 25 MMOL/L (ref 21–28)
CO2 SERPL-SCNC: 23 MMOL/L (ref 20–32)
CO2 SERPL-SCNC: 23 MMOL/L (ref 20–32)
CO2 SERPL-SCNC: 25 MMOL/L (ref 20–32)
CO2 SERPL-SCNC: 26 MMOL/L (ref 20–32)
CO2 SERPL-SCNC: 26 MMOL/L (ref 20–32)
CO2 SERPL-SCNC: 28 MMOL/L (ref 20–32)
CO2 SERPL-SCNC: 30 MMOL/L (ref 20–32)
CO2 SERPL-SCNC: 30 MMOL/L (ref 20–32)
CO2 SERPL-SCNC: 31 MMOL/L (ref 20–32)
CO2 SERPL-SCNC: 31 MMOL/L (ref 20–32)
CO2 SERPL-SCNC: 32 MMOL/L (ref 20–32)
CO2 SERPL-SCNC: 33 MMOL/L (ref 20–32)
COLOR UR AUTO: ABNORMAL
COLOR UR AUTO: YELLOW
COLOR UR AUTO: YELLOW
CREAT BLD-MCNC: 3.4 MG/DL (ref 0.52–1.04)
CREAT SERPL-MCNC: 0.59 MG/DL (ref 0.52–1.04)
CREAT SERPL-MCNC: 0.65 MG/DL (ref 0.52–1.04)
CREAT SERPL-MCNC: 0.68 MG/DL (ref 0.52–1.04)
CREAT SERPL-MCNC: 0.7 MG/DL (ref 0.52–1.04)
CREAT SERPL-MCNC: 0.75 MG/DL (ref 0.52–1.04)
CREAT SERPL-MCNC: 0.78 MG/DL (ref 0.52–1.04)
CREAT SERPL-MCNC: 0.88 MG/DL (ref 0.52–1.04)
CREAT SERPL-MCNC: 1.86 MG/DL (ref 0.52–1.04)
CREAT SERPL-MCNC: 2.03 MG/DL (ref 0.52–1.04)
CREAT SERPL-MCNC: 2.35 MG/DL (ref 0.52–1.04)
CREAT SERPL-MCNC: 2.45 MG/DL (ref 0.52–1.04)
CREAT SERPL-MCNC: 2.67 MG/DL (ref 0.52–1.04)
DIFFERENTIAL METHOD BLD: ABNORMAL
DOHLE BOD BLD QL SMEAR: PRESENT
EC STX1 GENE STL QL NAA+PROBE: NOT DETECTED
EC STX2 GENE STL QL NAA+PROBE: NOT DETECTED
ENTERIC PATHOGEN COMMENT: NORMAL
EOSINOPHIL # BLD AUTO: 0 10E9/L (ref 0–0.7)
EOSINOPHIL NFR BLD AUTO: 0 %
EOSINOPHIL NFR BLD AUTO: 0 %
EOSINOPHIL NFR BLD AUTO: 1 %
EOSINOPHIL NFR BLD AUTO: 1.9 %
EOSINOPHIL NFR BLD AUTO: 2 %
EOSINOPHIL NFR BLD AUTO: 3 %
EOSINOPHIL NFR BLD AUTO: 6 %
ERYTHROCYTE [DISTWIDTH] IN BLOOD BY AUTOMATED COUNT: 13.9 % (ref 10–15)
ERYTHROCYTE [DISTWIDTH] IN BLOOD BY AUTOMATED COUNT: 13.9 % (ref 10–15)
ERYTHROCYTE [DISTWIDTH] IN BLOOD BY AUTOMATED COUNT: 14 % (ref 10–15)
ERYTHROCYTE [DISTWIDTH] IN BLOOD BY AUTOMATED COUNT: 14 % (ref 10–15)
ERYTHROCYTE [DISTWIDTH] IN BLOOD BY AUTOMATED COUNT: 14.1 % (ref 10–15)
ERYTHROCYTE [DISTWIDTH] IN BLOOD BY AUTOMATED COUNT: 14.2 % (ref 10–15)
ERYTHROCYTE [DISTWIDTH] IN BLOOD BY AUTOMATED COUNT: 14.6 % (ref 10–15)
ERYTHROCYTE [DISTWIDTH] IN BLOOD BY AUTOMATED COUNT: 15 % (ref 10–15)
ERYTHROCYTE [DISTWIDTH] IN BLOOD BY AUTOMATED COUNT: 15.1 % (ref 10–15)
ERYTHROCYTE [DISTWIDTH] IN BLOOD BY AUTOMATED COUNT: 15.4 % (ref 10–15)
ERYTHROCYTE [SEDIMENTATION RATE] IN BLOOD BY WESTERGREN METHOD: 89 MM/H (ref 0–30)
FLUAV+FLUBV AG SPEC QL: NEGATIVE
FLUAV+FLUBV AG SPEC QL: NEGATIVE
FLUAV+FLUBV RNA SPEC QL NAA+PROBE: NEGATIVE
FLUAV+FLUBV RNA SPEC QL NAA+PROBE: POSITIVE
GFR SERPL CREATININE-BSD FRML MDRD: 13 ML/MIN/1.7M2
GFR SERPL CREATININE-BSD FRML MDRD: 17 ML/MIN/1.7M2
GFR SERPL CREATININE-BSD FRML MDRD: 19 ML/MIN/1.7M2
GFR SERPL CREATININE-BSD FRML MDRD: 19 ML/MIN/1.7M2
GFR SERPL CREATININE-BSD FRML MDRD: 23 ML/MIN/1.7M2
GFR SERPL CREATININE-BSD FRML MDRD: 25 ML/MIN/1.7M2
GFR SERPL CREATININE-BSD FRML MDRD: 61 ML/MIN/1.7M2
GFR SERPL CREATININE-BSD FRML MDRD: 69 ML/MIN/1.7M2
GFR SERPL CREATININE-BSD FRML MDRD: 73 ML/MIN/1.7M2
GFR SERPL CREATININE-BSD FRML MDRD: 78 ML/MIN/1.7M2
GFR SERPL CREATININE-BSD FRML MDRD: 81 ML/MIN/1.7M2
GFR SERPL CREATININE-BSD FRML MDRD: 85 ML/MIN/1.7M2
GFR SERPL CREATININE-BSD FRML MDRD: >90 ML/MIN/1.7M2
GLUCOSE BLD-MCNC: 75 MG/DL (ref 70–99)
GLUCOSE BLDC GLUCOMTR-MCNC: 100 MG/DL (ref 70–99)
GLUCOSE BLDC GLUCOMTR-MCNC: 140 MG/DL (ref 70–99)
GLUCOSE BLDC GLUCOMTR-MCNC: 75 MG/DL (ref 70–99)
GLUCOSE SERPL-MCNC: 103 MG/DL (ref 70–99)
GLUCOSE SERPL-MCNC: 105 MG/DL (ref 70–99)
GLUCOSE SERPL-MCNC: 108 MG/DL (ref 70–99)
GLUCOSE SERPL-MCNC: 109 MG/DL (ref 70–99)
GLUCOSE SERPL-MCNC: 117 MG/DL (ref 70–99)
GLUCOSE SERPL-MCNC: 118 MG/DL (ref 70–99)
GLUCOSE SERPL-MCNC: 120 MG/DL (ref 70–99)
GLUCOSE SERPL-MCNC: 121 MG/DL (ref 70–99)
GLUCOSE SERPL-MCNC: 133 MG/DL (ref 70–99)
GLUCOSE SERPL-MCNC: 74 MG/DL (ref 70–99)
GLUCOSE SERPL-MCNC: 95 MG/DL (ref 70–99)
GLUCOSE SERPL-MCNC: 96 MG/DL (ref 70–99)
GLUCOSE UR STRIP-MCNC: NEGATIVE MG/DL
HCT VFR BLD AUTO: 20.5 % (ref 35–47)
HCT VFR BLD AUTO: 21.3 % (ref 35–47)
HCT VFR BLD AUTO: 21.6 % (ref 35–47)
HCT VFR BLD AUTO: 21.6 % (ref 35–47)
HCT VFR BLD AUTO: 21.9 % (ref 35–47)
HCT VFR BLD AUTO: 22 % (ref 35–47)
HCT VFR BLD AUTO: 22.2 % (ref 35–47)
HCT VFR BLD AUTO: 22.9 % (ref 35–47)
HCT VFR BLD AUTO: 23.3 % (ref 35–47)
HCT VFR BLD AUTO: 23.6 % (ref 35–47)
HCT VFR BLD AUTO: 24.6 % (ref 35–47)
HCT VFR BLD AUTO: 24.8 % (ref 35–47)
HCT VFR BLD CALC: 55 %PCV (ref 35–47)
HGB BLD CALC-MCNC: 18.7 G/DL (ref 11.7–15.7)
HGB BLD-MCNC: 6.6 G/DL (ref 11.7–15.7)
HGB BLD-MCNC: 7 G/DL (ref 11.7–15.7)
HGB BLD-MCNC: 7.1 G/DL (ref 11.7–15.7)
HGB BLD-MCNC: 7.2 G/DL (ref 11.7–15.7)
HGB BLD-MCNC: 7.4 G/DL (ref 11.7–15.7)
HGB BLD-MCNC: 7.4 G/DL (ref 11.7–15.7)
HGB BLD-MCNC: 7.6 G/DL (ref 11.7–15.7)
HGB BLD-MCNC: 7.7 G/DL (ref 11.7–15.7)
HGB BLD-MCNC: 7.7 G/DL (ref 11.7–15.7)
HGB BLD-MCNC: 8 G/DL (ref 11.7–15.7)
HGB BLD-MCNC: 8.1 G/DL (ref 11.7–15.7)
HGB BLD-MCNC: 8.3 G/DL (ref 11.7–15.7)
HGB BLD-MCNC: 8.6 G/DL (ref 11.7–15.7)
HGB UR QL STRIP: NEGATIVE
HYALINE CASTS #/AREA URNS LPF: 5 /LPF (ref 0–2)
HYPOCHROMIA BLD QL: PRESENT
IMM GRANULOCYTES # BLD: 0 10E9/L (ref 0–0.4)
IMM GRANULOCYTES # BLD: 0 10E9/L (ref 0–0.4)
IMM GRANULOCYTES NFR BLD: 0 %
IMM GRANULOCYTES NFR BLD: 0 %
INTERPRETATION ECG - MUSE: NORMAL
INTERPRETATION ECG - MUSE: NORMAL
KETONES UR STRIP-MCNC: NEGATIVE MG/DL
LACTATE BLD-SCNC: 0.6 MMOL/L (ref 0.7–2)
LACTATE BLD-SCNC: 0.6 MMOL/L (ref 0.7–2)
LACTATE BLD-SCNC: 0.7 MMOL/L (ref 0.7–2)
LACTATE BLD-SCNC: 1.1 MMOL/L (ref 0.7–2.1)
LACTATE BLD-SCNC: 1.2 MMOL/L (ref 0.7–2)
LACTATE BLD-SCNC: 1.3 MMOL/L (ref 0.7–2)
LACTATE BLD-SCNC: 1.8 MMOL/L (ref 0.7–2)
LACTATE BLD-SCNC: 1.8 MMOL/L (ref 0.7–2)
LEUKOCYTE ESTERASE UR QL STRIP: ABNORMAL
LEUKOCYTE ESTERASE UR QL STRIP: NEGATIVE
LEUKOCYTE ESTERASE UR QL STRIP: NEGATIVE
LIPASE SERPL-CCNC: 26 U/L (ref 73–393)
LYMPHOCYTES # BLD AUTO: 0.1 10E9/L (ref 0.8–5.3)
LYMPHOCYTES # BLD AUTO: 0.2 10E9/L (ref 0.8–5.3)
LYMPHOCYTES # BLD AUTO: 0.3 10E9/L (ref 0.8–5.3)
LYMPHOCYTES # BLD AUTO: 0.3 10E9/L (ref 0.8–5.3)
LYMPHOCYTES # BLD AUTO: 0.4 10E9/L (ref 0.8–5.3)
LYMPHOCYTES NFR BLD AUTO: 18 %
LYMPHOCYTES NFR BLD AUTO: 21 %
LYMPHOCYTES NFR BLD AUTO: 27 %
LYMPHOCYTES NFR BLD AUTO: 27 %
LYMPHOCYTES NFR BLD AUTO: 30 %
LYMPHOCYTES NFR BLD AUTO: 30.8 %
LYMPHOCYTES NFR BLD AUTO: 32.4 %
LYMPHOCYTES NFR BLD AUTO: 38 %
LYMPHOCYTES NFR BLD AUTO: 40 %
Lab: NORMAL
MACROCYTES BLD QL SMEAR: PRESENT
MCH RBC QN AUTO: 28.3 PG (ref 26.5–33)
MCH RBC QN AUTO: 28.4 PG (ref 26.5–33)
MCH RBC QN AUTO: 28.5 PG (ref 26.5–33)
MCH RBC QN AUTO: 28.5 PG (ref 26.5–33)
MCH RBC QN AUTO: 28.6 PG (ref 26.5–33)
MCH RBC QN AUTO: 28.6 PG (ref 26.5–33)
MCH RBC QN AUTO: 28.8 PG (ref 26.5–33)
MCH RBC QN AUTO: 28.9 PG (ref 26.5–33)
MCH RBC QN AUTO: 28.9 PG (ref 26.5–33)
MCH RBC QN AUTO: 29 PG (ref 26.5–33)
MCH RBC QN AUTO: 29.1 PG (ref 26.5–33)
MCH RBC QN AUTO: 29.1 PG (ref 26.5–33)
MCHC RBC AUTO-ENTMCNC: 32 G/DL (ref 31.5–36.5)
MCHC RBC AUTO-ENTMCNC: 32.2 G/DL (ref 31.5–36.5)
MCHC RBC AUTO-ENTMCNC: 32.2 G/DL (ref 31.5–36.5)
MCHC RBC AUTO-ENTMCNC: 32.4 G/DL (ref 31.5–36.5)
MCHC RBC AUTO-ENTMCNC: 32.4 G/DL (ref 31.5–36.5)
MCHC RBC AUTO-ENTMCNC: 32.5 G/DL (ref 31.5–36.5)
MCHC RBC AUTO-ENTMCNC: 32.7 G/DL (ref 31.5–36.5)
MCHC RBC AUTO-ENTMCNC: 32.9 G/DL (ref 31.5–36.5)
MCHC RBC AUTO-ENTMCNC: 32.9 G/DL (ref 31.5–36.5)
MCHC RBC AUTO-ENTMCNC: 33 G/DL (ref 31.5–36.5)
MCHC RBC AUTO-ENTMCNC: 33.6 G/DL (ref 31.5–36.5)
MCHC RBC AUTO-ENTMCNC: 33.6 G/DL (ref 31.5–36.5)
MCV RBC AUTO: 86 FL (ref 78–100)
MCV RBC AUTO: 86 FL (ref 78–100)
MCV RBC AUTO: 87 FL (ref 78–100)
MCV RBC AUTO: 88 FL (ref 78–100)
MCV RBC AUTO: 89 FL (ref 78–100)
METAMYELOCYTES # BLD: 0 10E9/L
METAMYELOCYTES # BLD: 0.1 10E9/L
METAMYELOCYTES NFR BLD MANUAL: 14 %
METAMYELOCYTES NFR BLD MANUAL: 2 %
MICROCYTES BLD QL SMEAR: PRESENT
MICROCYTES BLD QL SMEAR: PRESENT
MONOCYTES # BLD AUTO: 0 10E9/L (ref 0–1.3)
MONOCYTES # BLD AUTO: 0.1 10E9/L (ref 0–1.3)
MONOCYTES NFR BLD AUTO: 1 %
MONOCYTES NFR BLD AUTO: 10 %
MONOCYTES NFR BLD AUTO: 2 %
MONOCYTES NFR BLD AUTO: 2 %
MONOCYTES NFR BLD AUTO: 6 %
MONOCYTES NFR BLD AUTO: 6 %
MONOCYTES NFR BLD AUTO: 9 %
MONOCYTES NFR BLD AUTO: 9.3 %
MONOCYTES NFR BLD AUTO: 9.8 %
MUCOUS THREADS #/AREA URNS LPF: PRESENT /LPF
MYELOCYTES # BLD: 0 10E9/L
MYELOCYTES NFR BLD MANUAL: 1 %
NEUTROPHILS # BLD AUTO: 0.3 10E9/L (ref 1.6–8.3)
NEUTROPHILS # BLD AUTO: 0.5 10E9/L (ref 1.6–8.3)
NEUTROPHILS # BLD AUTO: 0.5 10E9/L (ref 1.6–8.3)
NEUTROPHILS # BLD AUTO: 0.6 10E9/L (ref 1.6–8.3)
NEUTROPHILS NFR BLD AUTO: 48 %
NEUTROPHILS NFR BLD AUTO: 54.8 %
NEUTROPHILS NFR BLD AUTO: 55 %
NEUTROPHILS NFR BLD AUTO: 56 %
NEUTROPHILS NFR BLD AUTO: 56.1 %
NEUTROPHILS NFR BLD AUTO: 57 %
NEUTROPHILS NFR BLD AUTO: 60 %
NEUTROPHILS NFR BLD AUTO: 65 %
NEUTROPHILS NFR BLD AUTO: 78 %
NITRATE UR QL: NEGATIVE
NOROV GI+II ORF1-ORF2 JNC STL QL NAA+PR: NOT DETECTED
NRBC # BLD AUTO: 0 10*3/UL
NRBC # BLD AUTO: 0 10*3/UL
NRBC BLD AUTO-RTO: 0 /100
NRBC BLD AUTO-RTO: 0 /100
NT-PROBNP SERPL-MCNC: ABNORMAL PG/ML (ref 0–1800)
NUM BPU REQUESTED: 1
NUM BPU REQUESTED: 2
PCO2 BLDV: 47 MM HG (ref 40–50)
PH BLDV: 7.34 PH (ref 7.32–7.43)
PH UR STRIP: 5 PH (ref 5–7)
PH UR STRIP: 5.5 PH (ref 5–7)
PH UR STRIP: 6 PH (ref 5–7)
PLATELET # BLD AUTO: 16 10E9/L (ref 150–450)
PLATELET # BLD AUTO: 18 10E9/L (ref 150–450)
PLATELET # BLD AUTO: 23 10E9/L (ref 150–450)
PLATELET # BLD AUTO: 31 10E9/L (ref 150–450)
PLATELET # BLD AUTO: 36 10E9/L (ref 150–450)
PLATELET # BLD AUTO: 37 10E9/L (ref 150–450)
PLATELET # BLD AUTO: 40 10E9/L (ref 150–450)
PLATELET # BLD AUTO: 40 10E9/L (ref 150–450)
PLATELET # BLD AUTO: 43 10E9/L (ref 150–450)
PLATELET # BLD AUTO: 44 10E9/L (ref 150–450)
PLATELET # BLD AUTO: 45 10E9/L (ref 150–450)
PLATELET # BLD AUTO: 46 10E9/L (ref 150–450)
PLATELET # BLD EST: ABNORMAL 10*3/UL
PO2 BLDV: 43 MM HG (ref 25–47)
POTASSIUM BLD-SCNC: 4.5 MMOL/L (ref 3.4–5.3)
POTASSIUM SERPL-SCNC: 3.4 MMOL/L (ref 3.4–5.3)
POTASSIUM SERPL-SCNC: 3.5 MMOL/L (ref 3.4–5.3)
POTASSIUM SERPL-SCNC: 3.6 MMOL/L (ref 3.4–5.3)
POTASSIUM SERPL-SCNC: 3.6 MMOL/L (ref 3.4–5.3)
POTASSIUM SERPL-SCNC: 3.7 MMOL/L (ref 3.4–5.3)
POTASSIUM SERPL-SCNC: 3.8 MMOL/L (ref 3.4–5.3)
POTASSIUM SERPL-SCNC: 3.9 MMOL/L (ref 3.4–5.3)
POTASSIUM SERPL-SCNC: 3.9 MMOL/L (ref 3.4–5.3)
POTASSIUM SERPL-SCNC: 4 MMOL/L (ref 3.4–5.3)
POTASSIUM SERPL-SCNC: 4.1 MMOL/L (ref 3.4–5.3)
POTASSIUM SERPL-SCNC: 4.4 MMOL/L (ref 3.4–5.3)
POTASSIUM SERPL-SCNC: 4.6 MMOL/L (ref 3.4–5.3)
PROT SERPL-MCNC: 5.8 G/DL (ref 6.8–8.8)
PROT SERPL-MCNC: 6.7 G/DL (ref 6.8–8.8)
RBC # BLD AUTO: 2.33 10E12/L (ref 3.8–5.2)
RBC # BLD AUTO: 2.42 10E12/L (ref 3.8–5.2)
RBC # BLD AUTO: 2.44 10E12/L (ref 3.8–5.2)
RBC # BLD AUTO: 2.45 10E12/L (ref 3.8–5.2)
RBC # BLD AUTO: 2.45 10E12/L (ref 3.8–5.2)
RBC # BLD AUTO: 2.53 10E12/L (ref 3.8–5.2)
RBC # BLD AUTO: 2.55 10E12/L (ref 3.8–5.2)
RBC # BLD AUTO: 2.65 10E12/L (ref 3.8–5.2)
RBC # BLD AUTO: 2.67 10E12/L (ref 3.8–5.2)
RBC # BLD AUTO: 2.67 10E12/L (ref 3.8–5.2)
RBC # BLD AUTO: 2.8 10E12/L (ref 3.8–5.2)
RBC # BLD AUTO: 2.82 10E12/L (ref 3.8–5.2)
RBC #/AREA URNS AUTO: 1 /HPF (ref 0–2)
RBC #/AREA URNS AUTO: 2 /HPF (ref 0–2)
RBC #/AREA URNS AUTO: 2 /HPF (ref 0–2)
RBC MORPH BLD: ABNORMAL
RSV RNA SPEC NAA+PROBE: NEGATIVE
RVA NSP5 STL QL NAA+PROBE: NOT DETECTED
SALMONELLA SP RPOD STL QL NAA+PROBE: NOT DETECTED
SAO2 % BLDV FROM PO2: 76 %
SHIGELLA SP+EIEC IPAH STL QL NAA+PROBE: NOT DETECTED
SODIUM BLD-SCNC: 136 MMOL/L (ref 133–144)
SODIUM SERPL-SCNC: 136 MMOL/L (ref 133–144)
SODIUM SERPL-SCNC: 136 MMOL/L (ref 133–144)
SODIUM SERPL-SCNC: 138 MMOL/L (ref 133–144)
SODIUM SERPL-SCNC: 140 MMOL/L (ref 133–144)
SODIUM SERPL-SCNC: 141 MMOL/L (ref 133–144)
SODIUM SERPL-SCNC: 142 MMOL/L (ref 133–144)
SODIUM SERPL-SCNC: 145 MMOL/L (ref 133–144)
SODIUM SERPL-SCNC: 148 MMOL/L (ref 133–144)
SOURCE: ABNORMAL
SP GR UR STRIP: 1.01 (ref 1–1.03)
SP GR UR STRIP: 1.02 (ref 1–1.03)
SP GR UR STRIP: 1.02 (ref 1–1.03)
SPECIMEN EXP DATE BLD: NORMAL
SPECIMEN SOURCE: ABNORMAL
SPECIMEN SOURCE: NORMAL
SQUAMOUS #/AREA URNS AUTO: 1 /HPF (ref 0–1)
SQUAMOUS #/AREA URNS AUTO: 2 /HPF (ref 0–1)
SQUAMOUS #/AREA URNS AUTO: <1 /HPF (ref 0–1)
TOXIC GRANULES BLD QL SMEAR: PRESENT
TRANSFUSION STATUS PATIENT QL: NORMAL
TROPONIN I SERPL-MCNC: <0.015 UG/L (ref 0–0.04)
TROPONIN I SERPL-MCNC: <0.015 UG/L (ref 0–0.04)
TSH SERPL DL<=0.005 MIU/L-ACNC: 0.9 MU/L (ref 0.4–4)
UROBILINOGEN UR STRIP-MCNC: 2 MG/DL (ref 0–2)
UROBILINOGEN UR STRIP-MCNC: 4 MG/DL (ref 0–2)
UROBILINOGEN UR STRIP-MCNC: NORMAL MG/DL (ref 0–2)
V CHOL+PARA RFBL+TRKH+TNAA STL QL NAA+PR: NOT DETECTED
VANCOMYCIN SERPL-MCNC: 9.6 MG/L
WBC # BLD AUTO: 0.3 10E9/L (ref 4–11)
WBC # BLD AUTO: 0.5 10E9/L (ref 4–11)
WBC # BLD AUTO: 0.5 10E9/L (ref 4–11)
WBC # BLD AUTO: 0.6 10E9/L (ref 4–11)
WBC # BLD AUTO: 0.7 10E9/L (ref 4–11)
WBC # BLD AUTO: 0.8 10E9/L (ref 4–11)
WBC # BLD AUTO: 0.8 10E9/L (ref 4–11)
WBC # BLD AUTO: 0.9 10E9/L (ref 4–11)
WBC # BLD AUTO: 1 10E9/L (ref 4–11)
WBC # BLD AUTO: 1 10E9/L (ref 4–11)
WBC # BLD AUTO: 1.1 10E9/L (ref 4–11)
WBC # BLD AUTO: 1.1 10E9/L (ref 4–11)
WBC #/AREA URNS AUTO: 1 /HPF (ref 0–2)
WBC #/AREA URNS AUTO: 3 /HPF (ref 0–5)
WBC #/AREA URNS AUTO: 5 /HPF (ref 0–2)
Y ENTERO RECN STL QL NAA+PROBE: NOT DETECTED

## 2018-01-01 PROCEDURE — 25000132 ZZH RX MED GY IP 250 OP 250 PS 637: Performed by: PHYSICIAN ASSISTANT

## 2018-01-01 PROCEDURE — 85025 COMPLETE CBC W/AUTO DIFF WBC: CPT | Performed by: INTERNAL MEDICINE

## 2018-01-01 PROCEDURE — 76705 ECHO EXAM OF ABDOMEN: CPT

## 2018-01-01 PROCEDURE — 96367 TX/PROPH/DG ADDL SEQ IV INF: CPT

## 2018-01-01 PROCEDURE — 80048 BASIC METABOLIC PNL TOTAL CA: CPT | Performed by: INTERNAL MEDICINE

## 2018-01-01 PROCEDURE — 86900 BLOOD TYPING SEROLOGIC ABO: CPT | Performed by: INTERNAL MEDICINE

## 2018-01-01 PROCEDURE — 36415 COLL VENOUS BLD VENIPUNCTURE: CPT | Performed by: INTERNAL MEDICINE

## 2018-01-01 PROCEDURE — 99232 SBSQ HOSP IP/OBS MODERATE 35: CPT | Performed by: INTERNAL MEDICINE

## 2018-01-01 PROCEDURE — 25000132 ZZH RX MED GY IP 250 OP 250 PS 637: Performed by: CLINICAL NURSE SPECIALIST

## 2018-01-01 PROCEDURE — 00000146 ZZHCL STATISTIC GLUCOSE BY METER IP

## 2018-01-01 PROCEDURE — 25000128 H RX IP 250 OP 636: Performed by: INTERNAL MEDICINE

## 2018-01-01 PROCEDURE — 99233 SBSQ HOSP IP/OBS HIGH 50: CPT | Performed by: HOSPITALIST

## 2018-01-01 PROCEDURE — 25000128 H RX IP 250 OP 636: Performed by: EMERGENCY MEDICINE

## 2018-01-01 PROCEDURE — 96361 HYDRATE IV INFUSION ADD-ON: CPT

## 2018-01-01 PROCEDURE — 81001 URINALYSIS AUTO W/SCOPE: CPT | Performed by: EMERGENCY MEDICINE

## 2018-01-01 PROCEDURE — 97162 PT EVAL MOD COMPLEX 30 MIN: CPT | Mod: GP | Performed by: PHYSICAL THERAPIST

## 2018-01-01 PROCEDURE — G0378 HOSPITAL OBSERVATION PER HR: HCPCS

## 2018-01-01 PROCEDURE — 99233 SBSQ HOSP IP/OBS HIGH 50: CPT | Performed by: INTERNAL MEDICINE

## 2018-01-01 PROCEDURE — 97161 PT EVAL LOW COMPLEX 20 MIN: CPT | Mod: GP | Performed by: PHYSICAL THERAPIST

## 2018-01-01 PROCEDURE — 40000193 ZZH STATISTIC PT WARD VISIT: Performed by: PHYSICAL THERAPIST

## 2018-01-01 PROCEDURE — 99285 EMERGENCY DEPT VISIT HI MDM: CPT | Mod: 25

## 2018-01-01 PROCEDURE — 25000132 ZZH RX MED GY IP 250 OP 250 PS 637: Performed by: INTERNAL MEDICINE

## 2018-01-01 PROCEDURE — 84443 ASSAY THYROID STIM HORMONE: CPT | Performed by: INTERNAL MEDICINE

## 2018-01-01 PROCEDURE — 86901 BLOOD TYPING SEROLOGIC RH(D): CPT | Performed by: EMERGENCY MEDICINE

## 2018-01-01 PROCEDURE — 86901 BLOOD TYPING SEROLOGIC RH(D): CPT | Performed by: INTERNAL MEDICINE

## 2018-01-01 PROCEDURE — 83880 ASSAY OF NATRIURETIC PEPTIDE: CPT | Performed by: EMERGENCY MEDICINE

## 2018-01-01 PROCEDURE — 83605 ASSAY OF LACTIC ACID: CPT | Performed by: INTERNAL MEDICINE

## 2018-01-01 PROCEDURE — 51702 INSERT TEMP BLADDER CATH: CPT

## 2018-01-01 PROCEDURE — 25000125 ZZHC RX 250: Performed by: INTERNAL MEDICINE

## 2018-01-01 PROCEDURE — 96366 THER/PROPH/DIAG IV INF ADDON: CPT

## 2018-01-01 PROCEDURE — 36415 COLL VENOUS BLD VENIPUNCTURE: CPT | Performed by: PHYSICIAN ASSISTANT

## 2018-01-01 PROCEDURE — 12000000 ZZH R&B MED SURG/OB

## 2018-01-01 PROCEDURE — 99223 1ST HOSP IP/OBS HIGH 75: CPT | Mod: AI | Performed by: INTERNAL MEDICINE

## 2018-01-01 PROCEDURE — 86850 RBC ANTIBODY SCREEN: CPT | Performed by: INTERNAL MEDICINE

## 2018-01-01 PROCEDURE — 80053 COMPREHEN METABOLIC PANEL: CPT | Performed by: EMERGENCY MEDICINE

## 2018-01-01 PROCEDURE — 86923 COMPATIBILITY TEST ELECTRIC: CPT | Performed by: INTERNAL MEDICINE

## 2018-01-01 PROCEDURE — 87040 BLOOD CULTURE FOR BACTERIA: CPT | Performed by: EMERGENCY MEDICINE

## 2018-01-01 PROCEDURE — 86923 COMPATIBILITY TEST ELECTRIC: CPT | Performed by: EMERGENCY MEDICINE

## 2018-01-01 PROCEDURE — 36415 COLL VENOUS BLD VENIPUNCTURE: CPT | Performed by: HOSPITALIST

## 2018-01-01 PROCEDURE — 71046 X-RAY EXAM CHEST 2 VIEWS: CPT

## 2018-01-01 PROCEDURE — 36430 TRANSFUSION BLD/BLD COMPNT: CPT

## 2018-01-01 PROCEDURE — 85025 COMPLETE CBC W/AUTO DIFF WBC: CPT | Performed by: EMERGENCY MEDICINE

## 2018-01-01 PROCEDURE — 99239 HOSP IP/OBS DSCHRG MGMT >30: CPT | Performed by: INTERNAL MEDICINE

## 2018-01-01 PROCEDURE — 99219 ZZC INITIAL OBSERVATION CARE,LEVL II: CPT | Performed by: PHYSICIAN ASSISTANT

## 2018-01-01 PROCEDURE — 99226 ZZC SUBSEQUENT OBSERVATION CARE,LEVEL III: CPT | Performed by: HOSPITALIST

## 2018-01-01 PROCEDURE — 96365 THER/PROPH/DIAG IV INF INIT: CPT

## 2018-01-01 PROCEDURE — 83605 ASSAY OF LACTIC ACID: CPT | Performed by: PHYSICIAN ASSISTANT

## 2018-01-01 PROCEDURE — 87506 IADNA-DNA/RNA PROBE TQ 6-11: CPT | Performed by: HOSPITALIST

## 2018-01-01 PROCEDURE — P9016 RBC LEUKOCYTES REDUCED: HCPCS | Performed by: INTERNAL MEDICINE

## 2018-01-01 PROCEDURE — 99233 SBSQ HOSP IP/OBS HIGH 50: CPT | Mod: GV | Performed by: CLINICAL NURSE SPECIALIST

## 2018-01-01 PROCEDURE — 85652 RBC SED RATE AUTOMATED: CPT | Performed by: INTERNAL MEDICINE

## 2018-01-01 PROCEDURE — 93005 ELECTROCARDIOGRAM TRACING: CPT

## 2018-01-01 PROCEDURE — 94640 AIRWAY INHALATION TREATMENT: CPT | Mod: 76

## 2018-01-01 PROCEDURE — 85027 COMPLETE CBC AUTOMATED: CPT | Performed by: INTERNAL MEDICINE

## 2018-01-01 PROCEDURE — 86850 RBC ANTIBODY SCREEN: CPT | Performed by: EMERGENCY MEDICINE

## 2018-01-01 PROCEDURE — 80202 ASSAY OF VANCOMYCIN: CPT | Performed by: INTERNAL MEDICINE

## 2018-01-01 PROCEDURE — 94640 AIRWAY INHALATION TREATMENT: CPT

## 2018-01-01 PROCEDURE — 83690 ASSAY OF LIPASE: CPT | Performed by: EMERGENCY MEDICINE

## 2018-01-01 PROCEDURE — 40000275 ZZH STATISTIC RCP TIME EA 10 MIN

## 2018-01-01 PROCEDURE — 25800025 ZZH RX 258: Performed by: INTERNAL MEDICINE

## 2018-01-01 PROCEDURE — 71045 X-RAY EXAM CHEST 1 VIEW: CPT

## 2018-01-01 PROCEDURE — 80048 BASIC METABOLIC PNL TOTAL CA: CPT | Performed by: HOSPITALIST

## 2018-01-01 PROCEDURE — 70450 CT HEAD/BRAIN W/O DYE: CPT

## 2018-01-01 PROCEDURE — 80048 BASIC METABOLIC PNL TOTAL CA: CPT | Performed by: PHYSICIAN ASSISTANT

## 2018-01-01 PROCEDURE — 97530 THERAPEUTIC ACTIVITIES: CPT | Mod: GP | Performed by: PHYSICAL THERAPIST

## 2018-01-01 PROCEDURE — 12000047 ZZH R&B IMCU

## 2018-01-01 PROCEDURE — P9016 RBC LEUKOCYTES REDUCED: HCPCS | Performed by: EMERGENCY MEDICINE

## 2018-01-01 PROCEDURE — 83605 ASSAY OF LACTIC ACID: CPT | Performed by: HOSPITALIST

## 2018-01-01 PROCEDURE — 82803 BLOOD GASES ANY COMBINATION: CPT

## 2018-01-01 PROCEDURE — 84484 ASSAY OF TROPONIN QUANT: CPT | Performed by: EMERGENCY MEDICINE

## 2018-01-01 PROCEDURE — 40000274 ZZH STATISTIC RCP CONSULT EA 30 MIN

## 2018-01-01 PROCEDURE — 85018 HEMOGLOBIN: CPT | Performed by: INTERNAL MEDICINE

## 2018-01-01 PROCEDURE — 85018 HEMOGLOBIN: CPT | Performed by: PHYSICIAN ASSISTANT

## 2018-01-01 PROCEDURE — 87493 C DIFF AMPLIFIED PROBE: CPT | Performed by: HOSPITALIST

## 2018-01-01 PROCEDURE — 99207 ZZC CDG-CHARGE REQUIRED MANUAL ENTRY: CPT | Performed by: HOSPITALIST

## 2018-01-01 PROCEDURE — 87804 INFLUENZA ASSAY W/OPTIC: CPT | Performed by: HOSPITALIST

## 2018-01-01 PROCEDURE — 25000128 H RX IP 250 OP 636: Performed by: HOSPITALIST

## 2018-01-01 PROCEDURE — 99223 1ST HOSP IP/OBS HIGH 75: CPT | Mod: GV | Performed by: CLINICAL NURSE SPECIALIST

## 2018-01-01 PROCEDURE — 85014 HEMATOCRIT: CPT

## 2018-01-01 PROCEDURE — 83605 ASSAY OF LACTIC ACID: CPT

## 2018-01-01 PROCEDURE — 80047 BASIC METABLC PNL IONIZED CA: CPT

## 2018-01-01 PROCEDURE — 87086 URINE CULTURE/COLONY COUNT: CPT | Performed by: EMERGENCY MEDICINE

## 2018-01-01 PROCEDURE — 25000128 H RX IP 250 OP 636: Performed by: PHYSICIAN ASSISTANT

## 2018-01-01 PROCEDURE — 97116 GAIT TRAINING THERAPY: CPT | Mod: GP | Performed by: PHYSICAL THERAPIST

## 2018-01-01 PROCEDURE — 85025 COMPLETE CBC W/AUTO DIFF WBC: CPT | Performed by: PHYSICIAN ASSISTANT

## 2018-01-01 PROCEDURE — 86900 BLOOD TYPING SEROLOGIC ABO: CPT | Performed by: EMERGENCY MEDICINE

## 2018-01-01 PROCEDURE — 74176 CT ABD & PELVIS W/O CONTRAST: CPT

## 2018-01-01 PROCEDURE — 85025 COMPLETE CBC W/AUTO DIFF WBC: CPT | Performed by: HOSPITALIST

## 2018-01-01 PROCEDURE — 76604 US EXAM CHEST: CPT

## 2018-01-01 RX ORDER — CEFAZOLIN SODIUM 1 G/50ML
1250 SOLUTION INTRAVENOUS ONCE
Status: COMPLETED | OUTPATIENT
Start: 2018-01-01 | End: 2018-01-01

## 2018-01-01 RX ORDER — HYDROMORPHONE HYDROCHLORIDE 10 MG/ML
2 INJECTION INTRAMUSCULAR; INTRAVENOUS; SUBCUTANEOUS
Status: DISCONTINUED | OUTPATIENT
Start: 2018-01-01 | End: 2018-01-01

## 2018-01-01 RX ORDER — METOPROLOL SUCCINATE 100 MG/1
100 TABLET, EXTENDED RELEASE ORAL DAILY
Status: DISCONTINUED | OUTPATIENT
Start: 2018-01-01 | End: 2018-01-01

## 2018-01-01 RX ORDER — HYDROMORPHONE HYDROCHLORIDE 10 MG/ML
2 INJECTION INTRAMUSCULAR; INTRAVENOUS; SUBCUTANEOUS EVERY 4 HOURS
Status: DISCONTINUED | OUTPATIENT
Start: 2018-01-01 | End: 2018-01-01 | Stop reason: HOSPADM

## 2018-01-01 RX ORDER — CEFEPIME 1 G/50ML
2000 INJECTION, SOLUTION INTRAVENOUS EVERY 24 HOURS
Status: DISCONTINUED | OUTPATIENT
Start: 2018-01-01 | End: 2018-01-01

## 2018-01-01 RX ORDER — HYDROMORPHONE HYDROCHLORIDE 1 MG/ML
2 SOLUTION ORAL EVERY 4 HOURS
Qty: 30 ML | Refills: 0 | Status: SHIPPED | OUTPATIENT
Start: 2018-01-01

## 2018-01-01 RX ORDER — HYDROMORPHONE HYDROCHLORIDE 2 MG/1
2 TABLET ORAL EVERY 6 HOURS PRN
Status: ON HOLD | COMMUNITY
End: 2018-01-01

## 2018-01-01 RX ORDER — POLYMYXIN B SULFATE AND TRIMETHOPRIM 1; 10000 MG/ML; [USP'U]/ML
1 SOLUTION OPHTHALMIC 4 TIMES DAILY
Status: ON HOLD | COMMUNITY
End: 2018-01-01

## 2018-01-01 RX ORDER — NALOXONE HYDROCHLORIDE 0.4 MG/ML
.1-.4 INJECTION, SOLUTION INTRAMUSCULAR; INTRAVENOUS; SUBCUTANEOUS
Status: DISCONTINUED | OUTPATIENT
Start: 2018-01-01 | End: 2018-01-01 | Stop reason: HOSPADM

## 2018-01-01 RX ORDER — ALBUTEROL SULFATE 0.83 MG/ML
1 SOLUTION RESPIRATORY (INHALATION)
Status: DISCONTINUED | OUTPATIENT
Start: 2018-01-01 | End: 2018-01-01

## 2018-01-01 RX ORDER — ONDANSETRON 4 MG/1
4 TABLET, ORALLY DISINTEGRATING ORAL EVERY 6 HOURS PRN
Status: DISCONTINUED | OUTPATIENT
Start: 2018-01-01 | End: 2018-01-01 | Stop reason: HOSPADM

## 2018-01-01 RX ORDER — IPRATROPIUM BROMIDE AND ALBUTEROL SULFATE 2.5; .5 MG/3ML; MG/3ML
3 SOLUTION RESPIRATORY (INHALATION)
Status: DISCONTINUED | OUTPATIENT
Start: 2018-01-01 | End: 2018-01-01

## 2018-01-01 RX ORDER — CARBOXYMETHYLCELLULOSE SODIUM 5 MG/ML
1 SOLUTION/ DROPS OPHTHALMIC 3 TIMES DAILY
Status: DISCONTINUED | OUTPATIENT
Start: 2018-01-01 | End: 2018-01-01 | Stop reason: HOSPADM

## 2018-01-01 RX ORDER — SODIUM CHLORIDE 9 MG/ML
INJECTION, SOLUTION INTRAVENOUS CONTINUOUS
Status: DISCONTINUED | OUTPATIENT
Start: 2018-01-01 | End: 2018-01-01

## 2018-01-01 RX ORDER — ALBUTEROL SULFATE 0.83 MG/ML
1 SOLUTION RESPIRATORY (INHALATION) EVERY 4 HOURS PRN
Status: DISCONTINUED | OUTPATIENT
Start: 2018-01-01 | End: 2018-01-01 | Stop reason: HOSPADM

## 2018-01-01 RX ORDER — NALOXONE HYDROCHLORIDE 0.4 MG/ML
.1-.4 INJECTION, SOLUTION INTRAMUSCULAR; INTRAVENOUS; SUBCUTANEOUS
Status: DISCONTINUED | OUTPATIENT
Start: 2018-01-01 | End: 2018-01-01

## 2018-01-01 RX ORDER — ATROPINE SULFATE 10 MG/ML
1-2 SOLUTION/ DROPS OPHTHALMIC
Status: DISCONTINUED | OUTPATIENT
Start: 2018-01-01 | End: 2018-01-01 | Stop reason: HOSPADM

## 2018-01-01 RX ORDER — HALOPERIDOL 2 MG/ML
.5-1 SOLUTION ORAL EVERY 6 HOURS PRN
Qty: 30 ML | Refills: 0 | Status: SHIPPED | OUTPATIENT
Start: 2018-01-01

## 2018-01-01 RX ORDER — HYDROMORPHONE HYDROCHLORIDE 10 MG/ML
2-4 INJECTION INTRAMUSCULAR; INTRAVENOUS; SUBCUTANEOUS
Status: DISCONTINUED | OUTPATIENT
Start: 2018-01-01 | End: 2018-01-01 | Stop reason: HOSPADM

## 2018-01-01 RX ORDER — ACETAMINOPHEN 500 MG
500 TABLET ORAL EVERY 4 HOURS PRN
Status: DISCONTINUED | OUTPATIENT
Start: 2018-01-01 | End: 2018-01-01 | Stop reason: ALTCHOICE

## 2018-01-01 RX ORDER — ALBUTEROL SULFATE 0.83 MG/ML
1 SOLUTION RESPIRATORY (INHALATION) EVERY 6 HOURS PRN
COMMUNITY

## 2018-01-01 RX ORDER — LIDOCAINE 40 MG/G
CREAM TOPICAL
Status: DISCONTINUED | OUTPATIENT
Start: 2018-01-01 | End: 2018-01-01 | Stop reason: HOSPADM

## 2018-01-01 RX ORDER — IPRATROPIUM BROMIDE AND ALBUTEROL SULFATE 2.5; .5 MG/3ML; MG/3ML
3 SOLUTION RESPIRATORY (INHALATION) EVERY 4 HOURS
Status: DISCONTINUED | OUTPATIENT
Start: 2018-01-01 | End: 2018-01-01

## 2018-01-01 RX ORDER — ACETAMINOPHEN 325 MG/1
650 TABLET ORAL EVERY 4 HOURS PRN
Status: DISCONTINUED | OUTPATIENT
Start: 2018-01-01 | End: 2018-01-01 | Stop reason: HOSPADM

## 2018-01-01 RX ORDER — ACETAMINOPHEN 650 MG/1
650 SUPPOSITORY RECTAL EVERY 4 HOURS PRN
Qty: 12 SUPPOSITORY | Refills: 0 | Status: SHIPPED | OUTPATIENT
Start: 2018-01-01

## 2018-01-01 RX ORDER — ATROPINE SULFATE 10 MG/ML
2-4 SOLUTION/ DROPS OPHTHALMIC
Qty: 5 ML | Refills: 0 | Status: SHIPPED | OUTPATIENT
Start: 2018-01-01

## 2018-01-01 RX ORDER — NITROGLYCERIN 0.4 MG/1
0.4 TABLET SUBLINGUAL EVERY 5 MIN PRN
Status: DISCONTINUED | OUTPATIENT
Start: 2018-01-01 | End: 2018-01-01 | Stop reason: HOSPADM

## 2018-01-01 RX ORDER — HALOPERIDOL 2 MG/ML
.5-1 SOLUTION ORAL EVERY 6 HOURS PRN
Status: DISCONTINUED | OUTPATIENT
Start: 2018-01-01 | End: 2018-01-01 | Stop reason: HOSPADM

## 2018-01-01 RX ORDER — LIDOCAINE 40 MG/G
CREAM TOPICAL
Status: DISCONTINUED | OUTPATIENT
Start: 2018-01-01 | End: 2018-01-01

## 2018-01-01 RX ORDER — ACETAMINOPHEN 650 MG/1
650 SUPPOSITORY RECTAL EVERY 4 HOURS PRN
Status: DISCONTINUED | OUTPATIENT
Start: 2018-01-01 | End: 2018-01-01 | Stop reason: HOSPADM

## 2018-01-01 RX ORDER — HYDROMORPHONE HYDROCHLORIDE 1 MG/ML
INJECTION, SOLUTION INTRAMUSCULAR; INTRAVENOUS; SUBCUTANEOUS
Status: DISPENSED
Start: 2018-01-01 | End: 2018-01-01

## 2018-01-01 RX ORDER — HYDROMORPHONE HYDROCHLORIDE 2 MG/1
2 TABLET ORAL EVERY 6 HOURS PRN
Status: DISCONTINUED | OUTPATIENT
Start: 2018-01-01 | End: 2018-01-01

## 2018-01-01 RX ORDER — BISACODYL 10 MG
SUPPOSITORY, RECTAL RECTAL
Qty: 4 SUPPOSITORY | Refills: 0 | Status: SHIPPED | OUTPATIENT
Start: 2018-01-01

## 2018-01-01 RX ORDER — SALIVA STIMULANT COMB. NO.3
1 SPRAY, NON-AEROSOL (ML) MUCOUS MEMBRANE
Status: DISCONTINUED | OUTPATIENT
Start: 2018-01-01 | End: 2018-01-01 | Stop reason: HOSPADM

## 2018-01-01 RX ORDER — LORAZEPAM 2 MG/ML
0.25 INJECTION INTRAMUSCULAR ONCE
Status: COMPLETED | OUTPATIENT
Start: 2018-01-01 | End: 2018-01-01

## 2018-01-01 RX ORDER — HYDROMORPHONE HYDROCHLORIDE 10 MG/ML
2-4 INJECTION INTRAMUSCULAR; INTRAVENOUS; SUBCUTANEOUS EVERY 4 HOURS
Status: DISCONTINUED | OUTPATIENT
Start: 2018-01-01 | End: 2018-01-01

## 2018-01-01 RX ORDER — HYDROMORPHONE HYDROCHLORIDE 2 MG/1
2 TABLET ORAL 3 TIMES DAILY
Status: DISCONTINUED | OUTPATIENT
Start: 2018-01-01 | End: 2018-01-01

## 2018-01-01 RX ORDER — BISACODYL 10 MG
10 SUPPOSITORY, RECTAL RECTAL
Status: DISCONTINUED | OUTPATIENT
Start: 2018-01-01 | End: 2018-01-01 | Stop reason: HOSPADM

## 2018-01-01 RX ORDER — LOSARTAN POTASSIUM AND HYDROCHLOROTHIAZIDE 12.5; 5 MG/1; MG/1
1 TABLET ORAL DAILY
Status: DISCONTINUED | OUTPATIENT
Start: 2018-01-01 | End: 2018-01-01

## 2018-01-01 RX ORDER — HALOPERIDOL 5 MG/ML
2 INJECTION INTRAMUSCULAR ONCE
Status: DISCONTINUED | OUTPATIENT
Start: 2018-01-01 | End: 2018-01-01 | Stop reason: HOSPADM

## 2018-01-01 RX ORDER — HYDROMORPHONE HYDROCHLORIDE 1 MG/ML
.3-.5 INJECTION, SOLUTION INTRAMUSCULAR; INTRAVENOUS; SUBCUTANEOUS
Status: DISCONTINUED | OUTPATIENT
Start: 2018-01-01 | End: 2018-01-01 | Stop reason: HOSPADM

## 2018-01-01 RX ORDER — LOSARTAN POTASSIUM AND HYDROCHLOROTHIAZIDE 12.5; 5 MG/1; MG/1
1 TABLET ORAL DAILY
Status: ON HOLD | COMMUNITY
End: 2018-01-01

## 2018-01-01 RX ORDER — SACCHAROMYCES BOULARDII 250 MG
250 CAPSULE ORAL DAILY
Status: ON HOLD | COMMUNITY
End: 2018-01-01

## 2018-01-01 RX ORDER — POLYETHYLENE GLYCOL 3350 17 G/17G
17 POWDER, FOR SOLUTION ORAL DAILY PRN
Status: DISCONTINUED | OUTPATIENT
Start: 2018-01-01 | End: 2018-01-01 | Stop reason: HOSPADM

## 2018-01-01 RX ORDER — METOPROLOL SUCCINATE 50 MG/1
50 TABLET, EXTENDED RELEASE ORAL DAILY
Status: DISCONTINUED | OUTPATIENT
Start: 2018-01-01 | End: 2018-01-01 | Stop reason: HOSPADM

## 2018-01-01 RX ORDER — CEFAZOLIN SODIUM 1 G/50ML
1250 SOLUTION INTRAVENOUS ONCE
Status: DISCONTINUED | OUTPATIENT
Start: 2018-01-01 | End: 2018-01-01 | Stop reason: DRUGHIGH

## 2018-01-01 RX ORDER — ALBUTEROL SULFATE 0.83 MG/ML
1 SOLUTION RESPIRATORY (INHALATION) 3 TIMES DAILY
Status: ON HOLD | COMMUNITY
End: 2018-01-01

## 2018-01-01 RX ORDER — HYDROMORPHONE HYDROCHLORIDE 1 MG/ML
0.5 INJECTION, SOLUTION INTRAMUSCULAR; INTRAVENOUS; SUBCUTANEOUS ONCE
Status: COMPLETED | OUTPATIENT
Start: 2018-01-01 | End: 2018-01-01

## 2018-01-01 RX ORDER — ONDANSETRON 2 MG/ML
4 INJECTION INTRAMUSCULAR; INTRAVENOUS EVERY 6 HOURS PRN
Status: DISCONTINUED | OUTPATIENT
Start: 2018-01-01 | End: 2018-01-01 | Stop reason: HOSPADM

## 2018-01-01 RX ORDER — SODIUM CHLORIDE AND POTASSIUM CHLORIDE 150; 900 MG/100ML; MG/100ML
INJECTION, SOLUTION INTRAVENOUS CONTINUOUS
Status: DISCONTINUED | OUTPATIENT
Start: 2018-01-01 | End: 2018-01-01

## 2018-01-01 RX ORDER — HYDROMORPHONE HYDROCHLORIDE 1 MG/ML
0.3 INJECTION, SOLUTION INTRAMUSCULAR; INTRAVENOUS; SUBCUTANEOUS 3 TIMES DAILY
Status: DISCONTINUED | OUTPATIENT
Start: 2018-01-01 | End: 2018-01-01

## 2018-01-01 RX ORDER — VENLAFAXINE HYDROCHLORIDE 75 MG/1
75 TABLET, EXTENDED RELEASE ORAL DAILY
Status: DISCONTINUED | OUTPATIENT
Start: 2018-01-01 | End: 2018-01-01 | Stop reason: HOSPADM

## 2018-01-01 RX ORDER — LORAZEPAM 2 MG/ML
0.5 CONCENTRATE ORAL
Qty: 30 ML | Refills: 0 | Status: SHIPPED | OUTPATIENT
Start: 2018-01-01

## 2018-01-01 RX ADMIN — CARBOXYMETHYLCELLULOSE SODIUM 1 DROP: 5 SOLUTION/ DROPS OPHTHALMIC at 21:25

## 2018-01-01 RX ADMIN — DICLOFENAC SODIUM 2 G: 10 GEL TOPICAL at 21:19

## 2018-01-01 RX ADMIN — SODIUM CHLORIDE 1000 MG: 0.9 INJECTION, SOLUTION INTRAVENOUS at 09:10

## 2018-01-01 RX ADMIN — METOPROLOL SUCCINATE 50 MG: 50 TABLET, EXTENDED RELEASE ORAL at 08:10

## 2018-01-01 RX ADMIN — LOSARTAN POTASSIUM AND HYDROCHLOROTHIAZIDE 1 TABLET: 50; 12.5 TABLET, FILM COATED ORAL at 09:25

## 2018-01-01 RX ADMIN — HYDROMORPHONE HYDROCHLORIDE 2 MG: 2 TABLET ORAL at 15:26

## 2018-01-01 RX ADMIN — HYDROMORPHONE HYDROCHLORIDE 2 MG: 10 INJECTION, SOLUTION INTRAMUSCULAR; INTRAVENOUS; SUBCUTANEOUS at 06:20

## 2018-01-01 RX ADMIN — DEXTROSE AND SODIUM CHLORIDE: 5; 450 INJECTION, SOLUTION INTRAVENOUS at 14:24

## 2018-01-01 RX ADMIN — ACETAMINOPHEN 650 MG: 325 TABLET, FILM COATED ORAL at 08:30

## 2018-01-01 RX ADMIN — VENLAFAXINE HYDROCHLORIDE 75 MG: 75 TABLET, EXTENDED RELEASE ORAL at 07:40

## 2018-01-01 RX ADMIN — CARBOXYMETHYLCELLULOSE SODIUM 1 DROP: 5 SOLUTION/ DROPS OPHTHALMIC at 15:21

## 2018-01-01 RX ADMIN — OMEPRAZOLE 40 MG: 20 CAPSULE, DELAYED RELEASE ORAL at 10:07

## 2018-01-01 RX ADMIN — Medication 0.5 MG: at 09:54

## 2018-01-01 RX ADMIN — OMEPRAZOLE 40 MG: 20 CAPSULE, DELAYED RELEASE ORAL at 09:25

## 2018-01-01 RX ADMIN — METOPROLOL SUCCINATE 50 MG: 50 TABLET, EXTENDED RELEASE ORAL at 10:07

## 2018-01-01 RX ADMIN — IPRATROPIUM BROMIDE AND ALBUTEROL SULFATE 3 ML: .5; 3 SOLUTION RESPIRATORY (INHALATION) at 23:38

## 2018-01-01 RX ADMIN — IPRATROPIUM BROMIDE AND ALBUTEROL SULFATE 3 ML: .5; 3 SOLUTION RESPIRATORY (INHALATION) at 19:32

## 2018-01-01 RX ADMIN — ACETAMINOPHEN 650 MG: 325 TABLET, FILM COATED ORAL at 00:40

## 2018-01-01 RX ADMIN — DICLOFENAC SODIUM 2 G: 10 GEL TOPICAL at 07:44

## 2018-01-01 RX ADMIN — VENLAFAXINE HYDROCHLORIDE 75 MG: 75 TABLET, EXTENDED RELEASE ORAL at 08:30

## 2018-01-01 RX ADMIN — HYDROMORPHONE HYDROCHLORIDE 2 MG: 10 INJECTION, SOLUTION INTRAMUSCULAR; INTRAVENOUS; SUBCUTANEOUS at 21:19

## 2018-01-01 RX ADMIN — HYDROMORPHONE HYDROCHLORIDE 2 MG: 10 INJECTION, SOLUTION INTRAMUSCULAR; INTRAVENOUS; SUBCUTANEOUS at 13:20

## 2018-01-01 RX ADMIN — SODIUM CHLORIDE 1000 MG: 0.9 INJECTION, SOLUTION INTRAVENOUS at 12:00

## 2018-01-01 RX ADMIN — SODIUM CHLORIDE: 9 INJECTION, SOLUTION INTRAVENOUS at 02:03

## 2018-01-01 RX ADMIN — CARBOXYMETHYLCELLULOSE SODIUM 1 DROP: 5 SOLUTION/ DROPS OPHTHALMIC at 10:14

## 2018-01-01 RX ADMIN — CARBOXYMETHYLCELLULOSE SODIUM 1 DROP: 5 SOLUTION/ DROPS OPHTHALMIC at 21:19

## 2018-01-01 RX ADMIN — LOSARTAN POTASSIUM AND HYDROCHLOROTHIAZIDE 1 TABLET: 50; 12.5 TABLET, FILM COATED ORAL at 10:06

## 2018-01-01 RX ADMIN — VENLAFAXINE HYDROCHLORIDE 75 MG: 75 TABLET, EXTENDED RELEASE ORAL at 09:25

## 2018-01-01 RX ADMIN — METOPROLOL SUCCINATE 100 MG: 100 TABLET, EXTENDED RELEASE ORAL at 10:06

## 2018-01-01 RX ADMIN — IPRATROPIUM BROMIDE AND ALBUTEROL SULFATE 3 ML: .5; 3 SOLUTION RESPIRATORY (INHALATION) at 15:22

## 2018-01-01 RX ADMIN — LOSARTAN POTASSIUM AND HYDROCHLOROTHIAZIDE 1 TABLET: 50; 12.5 TABLET, FILM COATED ORAL at 08:30

## 2018-01-01 RX ADMIN — VENLAFAXINE HYDROCHLORIDE 75 MG: 75 TABLET, EXTENDED RELEASE ORAL at 18:46

## 2018-01-01 RX ADMIN — OMEPRAZOLE 40 MG: 20 CAPSULE, DELAYED RELEASE ORAL at 10:06

## 2018-01-01 RX ADMIN — HYDROMORPHONE HYDROCHLORIDE 2 MG: 10 INJECTION, SOLUTION INTRAMUSCULAR; INTRAVENOUS; SUBCUTANEOUS at 17:22

## 2018-01-01 RX ADMIN — OMEPRAZOLE 40 MG: 20 CAPSULE, DELAYED RELEASE ORAL at 08:30

## 2018-01-01 RX ADMIN — IPRATROPIUM BROMIDE AND ALBUTEROL SULFATE 3 ML: .5; 3 SOLUTION RESPIRATORY (INHALATION) at 08:19

## 2018-01-01 RX ADMIN — IPRATROPIUM BROMIDE AND ALBUTEROL SULFATE 3 ML: .5; 3 SOLUTION RESPIRATORY (INHALATION) at 08:09

## 2018-01-01 RX ADMIN — Medication 0.3 MG: at 00:06

## 2018-01-01 RX ADMIN — HYDROMORPHONE HYDROCHLORIDE 2 MG: 10 INJECTION, SOLUTION INTRAMUSCULAR; INTRAVENOUS; SUBCUTANEOUS at 10:14

## 2018-01-01 RX ADMIN — METOPROLOL SUCCINATE 100 MG: 100 TABLET, EXTENDED RELEASE ORAL at 09:01

## 2018-01-01 RX ADMIN — CEFEPIME 2000 MG: 1 INJECTION, SOLUTION INTRAVENOUS at 21:25

## 2018-01-01 RX ADMIN — DEXTROSE AND SODIUM CHLORIDE: 5; 900 INJECTION, SOLUTION INTRAVENOUS at 12:22

## 2018-01-01 RX ADMIN — METOPROLOL SUCCINATE 100 MG: 100 TABLET, EXTENDED RELEASE ORAL at 08:30

## 2018-01-01 RX ADMIN — ACETAMINOPHEN 650 MG: 325 TABLET, FILM COATED ORAL at 01:05

## 2018-01-01 RX ADMIN — METOPROLOL SUCCINATE 100 MG: 100 TABLET, EXTENDED RELEASE ORAL at 09:26

## 2018-01-01 RX ADMIN — CARBOXYMETHYLCELLULOSE SODIUM 1 DROP: 5 SOLUTION/ DROPS OPHTHALMIC at 08:40

## 2018-01-01 RX ADMIN — DEXTROSE AND SODIUM CHLORIDE: 5; 900 INJECTION, SOLUTION INTRAVENOUS at 02:57

## 2018-01-01 RX ADMIN — DICLOFENAC SODIUM 2 G: 10 GEL TOPICAL at 17:27

## 2018-01-01 RX ADMIN — OMEPRAZOLE 40 MG: 20 CAPSULE, DELAYED RELEASE ORAL at 07:40

## 2018-01-01 RX ADMIN — VENLAFAXINE HYDROCHLORIDE 75 MG: 75 TABLET, EXTENDED RELEASE ORAL at 10:07

## 2018-01-01 RX ADMIN — ACETAMINOPHEN 650 MG: 650 SUPPOSITORY RECTAL at 12:19

## 2018-01-01 RX ADMIN — IPRATROPIUM BROMIDE AND ALBUTEROL SULFATE 3 ML: .5; 3 SOLUTION RESPIRATORY (INHALATION) at 03:33

## 2018-01-01 RX ADMIN — OMEPRAZOLE 40 MG: 20 CAPSULE, DELAYED RELEASE ORAL at 08:11

## 2018-01-01 RX ADMIN — HYDROMORPHONE HYDROCHLORIDE 2 MG: 10 INJECTION, SOLUTION INTRAMUSCULAR; INTRAVENOUS; SUBCUTANEOUS at 08:25

## 2018-01-01 RX ADMIN — HYDROMORPHONE HYDROCHLORIDE 2 MG: 10 INJECTION, SOLUTION INTRAMUSCULAR; INTRAVENOUS; SUBCUTANEOUS at 16:01

## 2018-01-01 RX ADMIN — VANCOMYCIN HYDROCHLORIDE 1250 MG: 10 INJECTION, POWDER, LYOPHILIZED, FOR SOLUTION INTRAVENOUS at 23:28

## 2018-01-01 RX ADMIN — Medication 0.3 MG: at 08:40

## 2018-01-01 RX ADMIN — DEXTROSE AND SODIUM CHLORIDE: 5; 900 INJECTION, SOLUTION INTRAVENOUS at 04:36

## 2018-01-01 RX ADMIN — IPRATROPIUM BROMIDE AND ALBUTEROL SULFATE 3 ML: .5; 3 SOLUTION RESPIRATORY (INHALATION) at 17:02

## 2018-01-01 RX ADMIN — VANCOMYCIN HYDROCHLORIDE 1500 MG: 100 INJECTION, POWDER, LYOPHILIZED, FOR SOLUTION INTRAVENOUS at 14:24

## 2018-01-01 RX ADMIN — METOPROLOL SUCCINATE 100 MG: 100 TABLET, EXTENDED RELEASE ORAL at 18:46

## 2018-01-01 RX ADMIN — CARBOXYMETHYLCELLULOSE SODIUM 1 DROP: 5 SOLUTION/ DROPS OPHTHALMIC at 12:21

## 2018-01-01 RX ADMIN — LOSARTAN POTASSIUM AND HYDROCHLOROTHIAZIDE 1 TABLET: 50; 12.5 TABLET, FILM COATED ORAL at 09:01

## 2018-01-01 RX ADMIN — ACETAMINOPHEN 650 MG: 325 TABLET, FILM COATED ORAL at 21:59

## 2018-01-01 RX ADMIN — DEFEROXAMINE MESYLATE 1000 MG: 95 INJECTION, POWDER, LYOPHILIZED, FOR SOLUTION INTRAMUSCULAR; INTRAVENOUS; SUBCUTANEOUS at 14:42

## 2018-01-01 RX ADMIN — IPRATROPIUM BROMIDE AND ALBUTEROL SULFATE 3 ML: .5; 3 SOLUTION RESPIRATORY (INHALATION) at 04:25

## 2018-01-01 RX ADMIN — SODIUM CHLORIDE, POTASSIUM CHLORIDE, SODIUM LACTATE AND CALCIUM CHLORIDE 250 ML: 600; 310; 30; 20 INJECTION, SOLUTION INTRAVENOUS at 23:36

## 2018-01-01 RX ADMIN — SODIUM CHLORIDE, POTASSIUM CHLORIDE, SODIUM LACTATE AND CALCIUM CHLORIDE 1000 ML: 600; 310; 30; 20 INJECTION, SOLUTION INTRAVENOUS at 21:45

## 2018-01-01 RX ADMIN — IPRATROPIUM BROMIDE AND ALBUTEROL SULFATE 3 ML: .5; 3 SOLUTION RESPIRATORY (INHALATION) at 12:17

## 2018-01-01 RX ADMIN — SODIUM CHLORIDE 500 ML: 9 INJECTION, SOLUTION INTRAVENOUS at 12:54

## 2018-01-01 RX ADMIN — OMEPRAZOLE 40 MG: 20 CAPSULE, DELAYED RELEASE ORAL at 18:46

## 2018-01-01 RX ADMIN — METOPROLOL SUCCINATE 50 MG: 50 TABLET, EXTENDED RELEASE ORAL at 07:49

## 2018-01-01 RX ADMIN — SODIUM CHLORIDE: 9 INJECTION, SOLUTION INTRAVENOUS at 18:49

## 2018-01-01 RX ADMIN — VENLAFAXINE HYDROCHLORIDE 75 MG: 75 TABLET, EXTENDED RELEASE ORAL at 07:49

## 2018-01-01 RX ADMIN — VENLAFAXINE HYDROCHLORIDE 75 MG: 75 TABLET, EXTENDED RELEASE ORAL at 10:06

## 2018-01-01 RX ADMIN — DEXTROSE AND SODIUM CHLORIDE: 5; 450 INJECTION, SOLUTION INTRAVENOUS at 09:58

## 2018-01-01 RX ADMIN — POTASSIUM CHLORIDE: 2 INJECTION, SOLUTION, CONCENTRATE INTRAVENOUS at 13:21

## 2018-01-01 RX ADMIN — CEFEPIME HYDROCHLORIDE 2000 MG: 2 INJECTION, POWDER, FOR SOLUTION INTRAVENOUS at 21:09

## 2018-01-01 RX ADMIN — LORAZEPAM 0.25 MG: 2 INJECTION, SOLUTION INTRAMUSCULAR; INTRAVENOUS at 02:57

## 2018-01-01 RX ADMIN — CARBOXYMETHYLCELLULOSE SODIUM 1 DROP: 5 SOLUTION/ DROPS OPHTHALMIC at 16:01

## 2018-01-01 RX ADMIN — CEFEPIME HYDROCHLORIDE 2 G: 2 INJECTION, POWDER, FOR SOLUTION INTRAVENOUS at 22:40

## 2018-01-01 RX ADMIN — CARBOXYMETHYLCELLULOSE SODIUM 1 DROP: 5 SOLUTION/ DROPS OPHTHALMIC at 07:46

## 2018-01-01 RX ADMIN — HYDROMORPHONE HYDROCHLORIDE 2 MG: 2 TABLET ORAL at 21:24

## 2018-01-01 RX ADMIN — SODIUM CHLORIDE 500 ML: 9 INJECTION, SOLUTION INTRAVENOUS at 21:00

## 2018-01-01 RX ADMIN — CARBOXYMETHYLCELLULOSE SODIUM 1 DROP: 5 SOLUTION/ DROPS OPHTHALMIC at 16:32

## 2018-01-01 RX ADMIN — CARBOXYMETHYLCELLULOSE SODIUM 1 DROP: 5 SOLUTION/ DROPS OPHTHALMIC at 17:22

## 2018-01-01 RX ADMIN — HYDROMORPHONE HYDROCHLORIDE 2 MG: 10 INJECTION, SOLUTION INTRAMUSCULAR; INTRAVENOUS; SUBCUTANEOUS at 11:40

## 2018-01-01 RX ADMIN — ACETAMINOPHEN 650 MG: 325 TABLET, FILM COATED ORAL at 21:09

## 2018-01-01 RX ADMIN — VENLAFAXINE HYDROCHLORIDE 75 MG: 75 TABLET, EXTENDED RELEASE ORAL at 09:01

## 2018-01-01 RX ADMIN — Medication 12.5 MG: at 21:24

## 2018-01-01 RX ADMIN — OMEPRAZOLE 40 MG: 20 CAPSULE, DELAYED RELEASE ORAL at 09:01

## 2018-01-01 RX ADMIN — VENLAFAXINE HYDROCHLORIDE 75 MG: 75 TABLET, EXTENDED RELEASE ORAL at 08:11

## 2018-01-01 RX ADMIN — OMEPRAZOLE 40 MG: 20 CAPSULE, DELAYED RELEASE ORAL at 07:49

## 2018-01-01 RX ADMIN — DEFEROXAMINE MESYLATE 1000 MG: 500 INJECTION, POWDER, LYOPHILIZED, FOR SOLUTION INTRAMUSCULAR; INTRAVENOUS; SUBCUTANEOUS at 15:54

## 2018-01-01 RX ADMIN — Medication 0.3 MG: at 16:26

## 2018-01-01 RX ADMIN — IPRATROPIUM BROMIDE AND ALBUTEROL SULFATE 3 ML: .5; 3 SOLUTION RESPIRATORY (INHALATION) at 00:00

## 2018-01-01 RX ADMIN — ALBUTEROL SULFATE 2.5 MG: 2.5 SOLUTION RESPIRATORY (INHALATION) at 14:13

## 2018-01-01 RX ADMIN — SODIUM CHLORIDE 1000 MG: 0.9 INJECTION, SOLUTION INTRAVENOUS at 12:51

## 2018-01-01 ASSESSMENT — ACTIVITIES OF DAILY LIVING (ADL)
ADLS_ACUITY_SCORE: 38
ADLS_ACUITY_SCORE: 21
ADLS_ACUITY_SCORE: 21
ADLS_ACUITY_SCORE: 38
ADLS_ACUITY_SCORE: 21
ADLS_ACUITY_SCORE: 21
ADLS_ACUITY_SCORE: 20
ADLS_ACUITY_SCORE: 22
ADLS_ACUITY_SCORE: 21
ADLS_ACUITY_SCORE: 21
ADLS_ACUITY_SCORE: 38
ADLS_ACUITY_SCORE: 21
ADLS_ACUITY_SCORE: 21
ADLS_ACUITY_SCORE: 38
ADLS_ACUITY_SCORE: 38
ADLS_ACUITY_SCORE: 21
ADLS_ACUITY_SCORE: 38
ADLS_ACUITY_SCORE: 38
ADLS_ACUITY_SCORE: 20
ADLS_ACUITY_SCORE: 21
ADLS_ACUITY_SCORE: 38
ADLS_ACUITY_SCORE: 21
ADLS_ACUITY_SCORE: 23
ADLS_ACUITY_SCORE: 38
ADLS_ACUITY_SCORE: 21
ADLS_ACUITY_SCORE: 38
ADLS_ACUITY_SCORE: 38
ADLS_ACUITY_SCORE: 21
ADLS_ACUITY_SCORE: 38
ADLS_ACUITY_SCORE: 21
ADLS_ACUITY_SCORE: 38
ADLS_ACUITY_SCORE: 20
ADLS_ACUITY_SCORE: 22
ADLS_ACUITY_SCORE: 38
ADLS_ACUITY_SCORE: 21
ADLS_ACUITY_SCORE: 38
ADLS_ACUITY_SCORE: 23
ADLS_ACUITY_SCORE: 21
ADLS_ACUITY_SCORE: 21
ADLS_ACUITY_SCORE: 38
ADLS_ACUITY_SCORE: 21
ADLS_ACUITY_SCORE: 21
ADLS_ACUITY_SCORE: 38

## 2018-01-01 ASSESSMENT — ENCOUNTER SYMPTOMS
DIARRHEA: 0
COUGH: 0
DIFFICULTY URINATING: 0
WEAKNESS: 1
VOMITING: 0
ABDOMINAL PAIN: 0
CONFUSION: 1
FEVER: 1
WEAKNESS: 1
COUGH: 1
APPETITE CHANGE: 0
SHORTNESS OF BREATH: 0
CONFUSION: 1
BLOOD IN STOOL: 0
AGITATION: 1
FEVER: 0

## 2018-01-01 ASSESSMENT — PAIN SCALES - GENERAL
PAINLEVEL: MODERATE PAIN (5)
PAINLEVEL: EXTREME PAIN (9)

## 2018-01-10 NOTE — PROGRESS NOTES
Infusion Nursing Note:  Josy Thomson presents today for 2 units PRBC's and Desferal.    Patient seen by provider today: No   present during visit today: Not Applicable.    Note: No change in fatigue or SOB.    Intravenous Access:  Peripheral IV placed x2.    Treatment Conditions:  Blood transfusion consent signed 10/20/17.      Post Infusion Assessment:  Patient tolerated infusion without incident.  Site patent and intact, free from redness, edema or discomfort.    Discharge Plan:   Discharge instructions reviewed with: Patient and Family.  Patient discharged in stable condition accompanied by: daughter.  Departure Mode: Wheelchair.  Scheduled 1/23/18 for next transfusion.    FRANK DOAN RN

## 2018-01-10 NOTE — MR AVS SNAPSHOT
"              After Visit Summary   1/10/2018    Josy Thomson    MRN: [id number]           Patient Information     Date Of Birth          12/12/1927        Visit Information        Provider Department      1/10/2018 12:00 PM RH INFUSION CHAIR 10 [facility] Infusion Services        Today's Diagnoses     MDS (myelodysplastic syndrome) (H)    -  1    Anemia           Follow-ups after your visit        Your next 10 appointments already scheduled     Jan 23, 2018  8:30 AM CST   Level 4 with RH INFUSION CHAIR 3   [facility] Infusion Services ([facility])    [facility]  [address]  [facility] [address]   [phone]              Future tests that were ordered for you today     Open Standing Orders        Priority Remaining Interval Expires Ordered    Red blood cell prepare order unit Routine 99/100 CONDITIONAL (SPECIFY) BLOOD  1/8/2018            Who to contact     If you have questions or need follow up information about today's clinic visit or your schedule please contact  INFUSION SERVICES directly at [phone].  Normal or non-critical lab and imaging results will be communicated to you by [employer] Companyhart, letter or phone within 4 business days after the clinic has received the results. If you do not hear from us within 7 days, please contact the clinic through Atoshot or phone. If you have a critical or abnormal lab result, we will notify you by phone as soon as possible.  Submit refill requests through Viacor or call your pharmacy and they will forward the refill request to us. Please allow 3 business days for your refill to be completed.          Additional Information About Your Visit        [employer] Companyhart Information     Viacor lets you send messages to your doctor, view your test results, renew your prescriptions, schedule appointments and more. To sign up, go to [email or web address] . Click on \"Log " "in\" on the left side of the screen, which will take you to the Welcome page. Then click on \"Sign up Now\" on the right side of the page.     You will be asked to enter the access code listed below, as well as some personal information. Please follow the directions to create your username and password.     Your access code is: [email or web address]  Expires: 3/13/2018  4:02 PM     Your access code will  in 90 days. If you need help or a new code, please call your [facility] clinic or [phone].        Care EveryWhere ID     This is your Care EveryWhere ID. This could be used by other organizations to access your [facility] medical records  [id number]        Your Vitals Were     Pulse Temperature Respirations             82 97.6  F (36.4  C) (Tympanic) 18          Blood Pressure from Last 3 Encounters:   01/10/18 143/72   17 145/69   17 131/82    Weight from Last 3 Encounters:   17 62.7 kg (138 lb 4.8 oz)   11/17/15 64.9 kg (143 lb)   10/12/15 63.5 kg (140 lb)              We Performed the Following     Transfuse red blood cell unit     Transfuse red blood cell unit        Primary Care Provider Office Phone # Fax #    Abeba Bradford [phone]       [facility] [address] [place] [address]        Equal Access to Services     [facility]: Hadii aad ku hadasho Soomaali, waaxda luqadaha, qaybta kaalmada adeegyada, waxay ama sheets . So [facility] [phone].    ATENCIÓN: Si habla español, tiene a joe disposición servicios gratuitos de asistencia lingüística. Llame al [phone].    We comply with applicable federal civil rights laws and [place] laws. We do not discriminate on the basis of race, color, national origin, age, disability, sex, sexual orientation, or gender identity.            Thank you!     Thank you for choosing [facility] INFUSION SERVICES  for your care. Our goal is always to provide you with excellent care. " Hearing back from our patients is one way we can continue to improve our services. Please take a few minutes to complete the written survey that you may receive in the mail after your visit with us. Thank you!             Your Updated Medication List - Protect others around you: Learn how to safely use, store and throw away your medicines at www.disposemymeds.org.          This list is accurate as of: 1/10/18  4:02 PM.  Always use your most recent med list.                   Brand Name Dispense Instructions for use Diagnosis    acetaminophen 325 MG tablet    TYLENOL    250 tablet    Take 2 tablets by mouth every 4 hours as needed.    OA (osteoarthritis)       B COMPLEX 1 PO      Take by mouth daily        Calcium carb-Vitamin D 500 mg [place]-200 units 500-200 MG-UNIT per tablet    OSCAL with D;Oyster Shell Calcium     Take 1 tablet by mouth 2 times daily (with meals).        cyanocobalamin 1000 MCG/ML injection    VITAMIN B12     Inject 1 mL into the muscle every 30 days        DARBEPOETIN KEVIN-POLYSORBATE IJ           deferoxamine      Inject 1,000 mg into the vein once With blood transfusions        EFFEXOR XR 75 MG 24 hr capsule   Generic drug:  venlafaxine      Take 75 mg by mouth daily.        HYDROMORPHONE HCL PO           losartan-hydrochlorothiazide 100-25 MG per tablet    HYZAAR     Take 1 tablet by mouth daily.        metoprolol 100 MG 24 hr tablet    TOPROL-XL     Take 100 mg by mouth daily.        omeprazole 20 MG CR capsule    priLOSEC     Take 40 mg by mouth daily        VITAMIN MIXTURE PO      Take 2 tablets by mouth daily Occuvite for eye health

## 2018-01-23 NOTE — PROGRESS NOTES
Infusion Nursing Note:  Josy Thomson presents today for 2 units of PRBC's and a Desferal infusion.    Patient seen by provider today: No   present during visit today: Not Applicable.    Note: Patient alert and good color upon arrival today.    Intravenous Access:  Peripheral IVs placed (1 for the blood and 1 for the Desferal).    Treatment Conditions:  Hemoglobin 6.2 on 1/22/17.      Post Infusion Assessment:  Patient tolerated infusion without incident.  Blood return noted pre and post infusion.  Site patent and intact, free from redness, edema or discomfort.  No evidence of extravasations.  Access discontinued per protocol.    Discharge Plan:   Patient declined prescription refills.  Discharge instructions reviewed with: Patient and Family.  Patient and/or family verbalized understanding of discharge instructions and all questions answered.  Patient discharged in stable condition accompanied by: daughter.  Departure Mode: Wheelchair.    Yanni Masterson RN

## 2018-01-23 NOTE — MR AVS SNAPSHOT
"              After Visit Summary   1/23/2018    Josy Thomson    MRN: [id number]           Patient Information     Date Of Birth          12/12/1927        Visit Information        Provider Department      1/23/2018 8:30 AM RH INFUSION CHAIR 3 [facility] Infusion Services        Today's Diagnoses     MDS (myelodysplastic syndrome) (H)    -  1    Anemia           Follow-ups after your visit        Future tests that were ordered for you today     Open Standing Orders        Priority Remaining Interval Expires Ordered    Red blood cell prepare order unit Routine 99/100 CONDITIONAL (SPECIFY) BLOOD  1/19/2018            Who to contact     If you have questions or need follow up information about today's clinic visit or your schedule please contact [facility] INFUSION SERVICES directly at [phone].  Normal or non-critical lab and imaging results will be communicated to you by OQOhart, letter or phone within 4 business days after the clinic has received the results. If you do not hear from us within 7 days, please contact the clinic through Internet REITt or phone. If you have a critical or abnormal lab result, we will notify you by phone as soon as possible.  Submit refill requests through [employer] or call your pharmacy and they will forward the refill request to us. Please allow 3 business days for your refill to be completed.          Additional Information About Your Visit        [employer] Information     [employer] lets you send messages to your doctor, view your test results, renew your prescriptions, schedule appointments and more. To sign up, go to [email or web address] . Click on \"Log in\" on the left side of the screen, which will take you to the Welcome page. Then click on \"Sign up Now\" on the right side of the page.     You will be asked to enter the access code listed below, as well as some personal information. Please follow the directions to create your username and password.   "   Your access code is: [email or web address]  Expires: 3/13/2018  4:02 PM     Your access code will  in 90 days. If you need help or a new code, please call your [facility] or [phone].        Care EveryWhere ID     This is your Care EveryWhere ID. This could be used by other organizations to access your [place] medical records  [id number]        Your Vitals Were     Temperature Respirations Pulse Oximetry             98.1  F (36.7  C) (Tympanic) 18 94%          Blood Pressure from Last 3 Encounters:   18 160/80   01/10/18 143/72   17 145/69    Weight from Last 3 Encounters:   17 62.7 kg (138 lb 4.8 oz)   11/17/15 64.9 kg (143 lb)   10/12/15 63.5 kg (140 lb)              We Performed the Following     Transfuse red blood cell unit     Transfuse red blood cell unit        Primary Care Provider Office Phone # Fax #    Abeba Meron Bradford [phone]       [facility] [address] [place] [address]        Equal Access to Services     [facility]: Hadii aad ku hadasho Soomaali, waaxda luqadaha, qaybta kaalmada adeegyada, waxay idiin hayaan aderosalind sheets . So [facility] [phone].    ATENCIÓN: Si habla español, tiene a joe disposición servicios gratuitos de asistencia lingüística. Llame al [phone].    We comply with applicable federal civil rights laws and [place] laws. We do not discriminate on the basis of race, color, national origin, age, disability, sex, sexual orientation, or gender identity.            Thank you!     Thank you for choosing [facility] INFUSION SERVICES  for your care. Our goal is always to provide you with excellent care. Hearing back from our patients is one way we can continue to improve our services. Please take a few minutes to complete the written survey that you may receive in the mail after your visit with us. Thank you!             Your Updated Medication List - Protect others around you: Learn how to  safely use, store and throw away your medicines at www.disposemymeds.org.          This list is accurate as of: 1/23/18 12:04 PM.  Always use your most recent med list.                   Brand Name Dispense Instructions for use Diagnosis    acetaminophen 325 MG tablet    TYLENOL    250 tablet    Take 2 tablets by mouth every 4 hours as needed.    OA (osteoarthritis)       B COMPLEX 1 PO      Take by mouth daily        Calcium carb-Vitamin D 500 mg [place]-200 units 500-200 MG-UNIT per tablet    OSCAL with D;Oyster Shell Calcium     Take 1 tablet by mouth 2 times daily (with meals).        cyanocobalamin 1000 MCG/ML injection    VITAMIN B12     Inject 1 mL into the muscle every 30 days        DARBEPOETIN KEVIN-POLYSORBATE IJ           deferoxamine      Inject 1,000 mg into the vein once With blood transfusions        EFFEXOR XR 75 MG 24 hr capsule   Generic drug:  venlafaxine      Take 75 mg by mouth daily.        HYDROMORPHONE HCL PO           losartan-hydrochlorothiazide 100-25 MG per tablet    HYZAAR     Take 1 tablet by mouth daily.        metoprolol succinate 100 MG 24 hr tablet    TOPROL-XL     Take 100 mg by mouth daily.        omeprazole 20 MG CR capsule    priLOSEC     Take 40 mg by mouth daily        VITAMIN MIXTURE PO      Take 2 tablets by mouth daily Occuvite for eye health

## 2018-02-07 NOTE — MR AVS SNAPSHOT
"              After Visit Summary   2/7/2018    Josy Thomson    MRN: [id number]           Patient Information     Date Of Birth          12/12/1927        Visit Information        Provider Department      2/7/2018 11:30 AM RH INFUSION CHAIR 4 [facility] Infusion Services        Today's Diagnoses     MDS (myelodysplastic syndrome) (H)    -  1    Anemia           Follow-ups after your visit        Your next 10 appointments already scheduled     Feb 20, 2018  9:00 AM CST   Level 4 with RH INFUSION CHAIR 11   [facility] Infusion Services ([facility])    [facility]  [address]  [facility] [address]   [phone]              Future tests that were ordered for you today     Open Standing Orders        Priority Remaining Interval Expires Ordered    Red blood cell prepare order unit Routine 99/100 CONDITIONAL (SPECIFY) BLOOD  2/5/2018            Who to contact     If you have questions or need follow up information about today's clinic visit or your schedule please contact [facility] INFUSION SERVICES directly at [phone].  Normal or non-critical lab and imaging results will be communicated to you by [employer], letter or phone within 4 business days after the clinic has received the results. If you do not hear from us within 7 days, please contact the clinic through [employer] or phone. If you have a critical or abnormal lab result, we will notify you by phone as soon as possible.  Submit refill requests through [employer] or call your pharmacy and they will forward the refill request to us. Please allow 3 business days for your refill to be completed.          Additional Information About Your Visit        [employer] Information     [employer] lets you send messages to your doctor, view your test results, renew your prescriptions, schedule appointments and more. To sign up, go to [email or web address] . Click on \"Log in\" " "on the left side of the screen, which will take you to the Welcome page. Then click on \"Sign up Now\" on the right side of the page.     You will be asked to enter the access code listed below, as well as some personal information. Please follow the directions to create your username and password.     Your access code is: [email or web address]  Expires: 3/13/2018  4:02 PM     Your access code will  in 90 days. If you need help or a new code, please call your [place] clinic or [phone].        Care EveryWhere ID     This is your Care EveryWhere ID. This could be used by other organizations to access your [place] medical records  [id number]        Your Vitals Were     Pulse Temperature Respirations Pulse Oximetry          92 97.9  F (36.6  C) 16 99%         Blood Pressure from Last 3 Encounters:   18 132/59   18 160/80   01/10/18 143/72    Weight from Last 3 Encounters:   17 62.7 kg (138 lb 4.8 oz)   11/17/15 64.9 kg (143 lb)   10/12/15 63.5 kg (140 lb)              We Performed the Following     Transfuse red blood cell unit     Transfuse red blood cell unit        Primary Care Provider Office Phone # Fax #    Abeba Meron Bradford [phone]       [facility] [address] [place] [address]        Equal Access to Services     [facility]: Hadii aad ku hadasho Soomaali, waaxda luqadaha, qaybta kaalmada adeegyada, zia sheets . So [facility] [phone].    ATENCIÓN: Si habla español, tiene a joe disposición servicios gratuitos de asistencia lingüística. Llame al [phone].    We comply with applicable federal civil rights laws and [place] laws. We do not discriminate on the basis of race, color, national origin, age, disability, sex, sexual orientation, or gender identity.            Thank you!     Thank you for choosing [facility] INFUSION SERVICES  for your care. Our goal is always to provide you with excellent care. " Hearing back from our patients is one way we can continue to improve our services. Please take a few minutes to complete the written survey that you may receive in the mail after your visit with us. Thank you!             Your Updated Medication List - Protect others around you: Learn how to safely use, store and throw away your medicines at www.disposemymeds.org.          This list is accurate as of 2/7/18  4:12 PM.  Always use your most recent med list.                   Brand Name Dispense Instructions for use Diagnosis    acetaminophen 325 MG tablet    TYLENOL    250 tablet    Take 2 tablets by mouth every 4 hours as needed.    OA (osteoarthritis)       B COMPLEX 1 PO      Take by mouth daily        Calcium carb-Vitamin D 500 mg [place]-200 units 500-200 MG-UNIT per tablet    OSCAL with D;Oyster Shell Calcium     Take 1 tablet by mouth 2 times daily (with meals).        cyanocobalamin 1000 MCG/ML injection    VITAMIN B12     Inject 1 mL into the muscle every 30 days        DARBEPOETIN KEVIN-POLYSORBATE IJ           deferoxamine      Inject 1,000 mg into the vein once With blood transfusions        EFFEXOR XR 75 MG 24 hr capsule   Generic drug:  venlafaxine      Take 75 mg by mouth daily.        HYDROMORPHONE HCL PO           losartan-hydrochlorothiazide 100-25 MG per tablet    HYZAAR     Take 1 tablet by mouth daily.        metoprolol succinate 100 MG 24 hr tablet    TOPROL-XL     Take 100 mg by mouth daily.        omeprazole 20 MG CR capsule    priLOSEC     Take 40 mg by mouth daily        VITAMIN MIXTURE PO      Take 2 tablets by mouth daily Occuvite for eye health

## 2018-02-07 NOTE — PROGRESS NOTES
Infusion Nursing Note:  Josy Thomson presents today for 2 unit PRBC and Desferal.    Patient seen by provider today: No   present during visit today: Not Applicable.    Note: Maximum rate for PRBC 250mL/hr, as done previously. Tolerated rate well.    Intravenous Access:  Peripheral IVs placed x2 - one for PRBC and one for Desferal.    Treatment Conditions:  Blood transfusion consent signed 10/20/18.  Hgb 6.7 on 2/5/18.    Post Infusion Assessment:  Patient tolerated infusion without incident.  Blood return noted pre and post infusion.  Site patent and intact, free from redness, edema or discomfort.  No evidence of extravasations.  Access discontinued per protocol.    Discharge Plan:   Discharge instructions reviewed with: Family.  Patient and/or family verbalized understanding of discharge instructions and all questions answered.  Copy of AVS reviewed with patient and/or family.  Patient will return 2/20/18 for PRBC for next appointment.  Patient discharged in stable condition accompanied by: daughters.  Departure Mode: Wheelchair.    Patricia Ribeiro RN

## 2018-02-16 PROBLEM — R53.1 GENERALIZED WEAKNESS: Status: ACTIVE | Noted: 2018-01-01

## 2018-02-16 PROBLEM — D61.818 PANCYTOPENIA (H): Status: ACTIVE | Noted: 2018-01-01

## 2018-02-16 NOTE — IP AVS SNAPSHOT
"    [place] [address] MEDICAL SURGICAL: [phone]                                              INTERAGENCY TRANSFER FORM - LAB / IMAGING / EKG / EMG RESULTS   2018                    Hospital Admission Date: 2018  TIFFANY MULTANI   : 1927  Sex: Female        Attending Provider: Aron Waggoner MD     Allergies:  No Known Allergies    Infection:  None   Service:  Observation    Ht:  1.499 m (4' 11\")   Wt:  56.9 kg (125 lb 6.4 oz)   Admission Wt:  60.5 kg (133 lb 6.4 oz)    BMI:  25.33 kg/m 2   BSA:  1.54 m 2            Patient PCP Information     Provider PCP Type    Abeba Bradford MD General         Lab Results - 3 Days      Hemoglobin [[id number]] (Abnormal)  Resulted: 18 0804, Result status: Final result    Ordering provider: Mallory Puga MD  18 0000 Resulting lab: [facility]    Specimen Information    Type Source Collected On   Blood  18 0757          Components       Value Reference Range Flag Lab   Hemoglobin 8.3 11.7 - 15.7 g/dL L FrRdHs            CBC with platelets differential [[id number]] (Abnormal)  Resulted: 18 0847, Result status: Final result    Ordering provider: Mallory Puga MD  18 0000 Resulting lab: [facility]    Specimen Information    Type Source Collected On   Blood  18 0712          Components       Value Reference Range Flag Lab   WBC 1.0 4.0 - 11.0 10e9/L L FrRdHs   RBC Count 2.44 3.8 - 5.2 10e12/L L FrRdHs   Hemoglobin 7.1 11.7 - 15.7 g/dL L FrRdHs   Hematocrit 21.6 35.0 - 47.0 % L FrRdHs   MCV 89 78 - 100 fl  FrRdHs   MCH 29.1 26.5 - 33.0 pg  FrRdHs   MCHC 32.9 31.5 - 36.5 g/dL  FrRdHs   RDW 14.0 10.0 - 15.0 %  FrRdHs   Platelet Count 36 150 - 450 10e9/L LL FrRdHs   Comment:  .   Consistent with previous critical result     Diff Method Manual Differential   FrRdHs   % Neutrophils 48.0 %  FrRdHs   % Lymphocytes 38.0 %  FrRdHs   % Monocytes 6.0 %  FrRdHs   % Eosinophils 2.0 %  FrRdHs   % Basophils " 6.0 %  FrRdHs   Absolute Neutrophil 0.5 1.6 - 8.3 10e9/L L FrRdHs   Comment:  .   Consistent with previous critical result     Absolute Lymphocytes 0.4 0.8 - 5.3 10e9/L L FrRdHs   Absolute Monocytes 0.1 0.0 - 1.3 10e9/L  FrRdHs   Absolute Eosinophils 0.0 0.0 - 0.7 10e9/L  FrRdHs   Absolute Basophils 0.1 0.0 - 0.2 10e9/L  FrRdHs   RBC Morphology Consistent with reported results   [facility]   Platelet Estimate Automated count confirmed.  Platelet morphology is normal.   [facility]            Basic metabolic panel [[id number]] (Abnormal)  Resulted: 02/23/18 0800, Result status: Final result    Ordering provider: Mallory Puga MD  02/23/18 0000 Resulting lab: [facility]    Specimen Information    Type Source Collected On   Blood  02/23/18 0712          Components       Value Reference Range Flag Lab   Sodium 142 133 - 144 mmol/L  FrRdHs   Potassium 3.7 3.4 - 5.3 mmol/L  FrRdHs   Chloride 107 94 - 109 mmol/L  FrRdHs   Carbon Dioxide 30 20 - 32 mmol/L  FrRdHs   Anion Gap 5 3 - 14 mmol/L  FrRdHs   Glucose 105 70 - 99 mg/dL H FrRdHs   Urea Nitrogen 27 7 - 30 mg/dL  FrRdHs   Creatinine 0.68 0.52 - 1.04 mg/dL  FrRd   GFR Estimate 81 >60 mL/min/1.7m2  Rd   Comment:  Non  GFR Calc   GFR Estimate If Black >90 >60 mL/min/1.7m2  Rd   Comment:  African American GFR Calc   Calcium 8.7 8.5 - 10.1 mg/dL  Rd            Lactic acid level STAT [[id number]]  Resulted: 02/22/18 1017, Result status: Final result    Ordering provider: Mallory Puga MD  02/22/18 1001 Resulting lab: [facility]    Specimen Information    Type Source Collected On   Blood  02/22/18 1009          Components       Value Reference Range Flag Lab   Lactic Acid 1.2 0.7 - 2.0 mmol/L  FrRd            CBC with platelets [[id number]] (Abnormal)  Resulted: 02/22/18 0739, Result status: Final result    Ordering provider: Adonis Pérez DO  02/22/18 0001 Resulting lab: [facility]    Specimen  Information    Type Source Collected On   Blood  02/22/18 0659          Components       Value Reference Range Flag Lab   WBC 1.1 4.0 - 11.0 10e9/L L FrRdHs   RBC Count 2.80 3.8 - 5.2 10e12/L L FrRdHs   Hemoglobin 8.1 11.7 - 15.7 g/dL L FrRdHs   Hematocrit 24.8 35.0 - 47.0 % L FrRdHs   MCV 89 78 - 100 fl  FrRdHs   MCH 28.9 26.5 - 33.0 pg  FrRdHs   MCHC 32.7 31.5 - 36.5 g/dL  FrRdHs   RDW 14.0 10.0 - 15.0 %  FrRdHs   Platelet Count 44 150 - 450 10e9/L LL FrRdHs   Comment:  .   Consistent with previous critical result              Basic metabolic panel [[id number]] (Abnormal)  Resulted: 02/22/18 0738, Result status: Final result    Ordering provider: Adonis Pérez,   02/22/18 0001 Resulting lab: [facility]    Specimen Information    Type Source Collected On   Blood  02/22/18 0659          Components       Value Reference Range Flag Lab   Sodium 140 133 - 144 mmol/L  FrRdHs   Potassium 4.1 3.4 - 5.3 mmol/L  FrRdHs   Chloride 105 94 - 109 mmol/L  FrRdHs   Carbon Dioxide 33 20 - 32 mmol/L H FrRdHs   Anion Gap 2 3 - 14 mmol/L L FrRdHs   Glucose 118 70 - 99 mg/dL H FrRdHs   Urea Nitrogen 30 7 - 30 mg/dL  FrRdHs   Creatinine 0.88 0.52 - 1.04 mg/dL  FrRdHs   GFR Estimate 61 >60 mL/min/1.7m2  FrRdHs   Comment:  Non  GFR Calc   GFR Estimate If Black 73 >60 mL/min/1.7m2  [facility]   Comment:  African American GFR Calc   Calcium 8.9 8.5 - 10.1 mg/dL  RdHs            Testing Performed By     Lab - Abbreviation Name Director Address Valid Date Range    12 - FrRdHs [facility] Unknown [address] [facility] [address] 05/08/15 1057 - Present            Unresulted Labs     None      Encounter-Level Documents:     There are no encounter-level documents.      Order-Level Documents:     There are no order-level documents.

## 2018-02-16 NOTE — PLAN OF CARE
Problem: Patient Care Overview  Goal: Plan of Care/Patient Progress Review  ROOM # 220    Living Situation (if not independent, order SW consult): lives independently  Facility name:  : Jazmine (daughter)    Activity level at baseline: independent with walker and daughter assists with ADLs  Activity level on admit: A-2      Patient registered to observation; given Patient Bill of Rights; given the opportunity to ask questions about observation status and their plan of care.  Patient has been oriented to the observation room, bathroom and call light is in place.    Discussed discharge goals and expectations with patient/family.

## 2018-02-16 NOTE — ED NOTES
..  [facility]  ED Nurse Handoff Report    Josy Thomson is a 89 year old female   ED Chief complaint: Generalized Weakness and Altered Mental Status  . ED Diagnosis:   Final diagnoses:   None     Allergies: No Known Allergies    Code Status: Full Code  Activity level - Baseline/Home:  Independent. Activity Level - Current:   Stand with Assist. Lift room needed: No. Bariatric: No   Needed: No   Isolation: No. Infection: Not Applicable.     Vital Signs:   Vitals:    02/16/18 1245 02/16/18 1251 02/16/18 1300 02/16/18 1315   BP:  161/82 151/75 159/78   Temp:       TempSrc:       SpO2: 97% 92% 99%        Cardiac Rhythm:  ,      Pain level:    Patient confused: Yes. Patient Falls Risk: Yes.   Elimination Status: Cathed for urine   Patient Report - Initial Complaint: confusion, weakness. Focused Assessment: A&O x4 with noted confusion per daughter.  Pt lives alone, daughter is primary care provider, checking in on her daily.  Hx of 2 falls in January.   Tests Performed: labs, CT . Abnormal Results: ..  Labs Ordered and Resulted from Time of ED Arrival Up to the Time of Departure from the ED   CBC WITH PLATELETS DIFFERENTIAL - Abnormal; Notable for the following:        Result Value    WBC 0.8 (*)     RBC Count 2.45 (*)     Hemoglobin 7.0 (*)     Hematocrit 21.9 (*)     Platelet Count 40 (*)     Absolute Neutrophil 0.5 (*)     Absolute Lymphocytes 0.2 (*)     All other components within normal limits   COMPREHENSIVE METABOLIC PANEL - Abnormal; Notable for the following:     Glucose 117 (*)     Urea Nitrogen 35 (*)     Albumin 3.3 (*)     Protein Total 6.7 (*)     All other components within normal limits   URINE MACROSCOPIC WITH REFLEX TO MICRO - Abnormal; Notable for the following:     Mucous Urine Present (*)     All other components within normal limits   TROPONIN I   ABO/RH TYPE AND SCREEN   RED BLOOD CELL PREPARE ORDER UNIT   BLOOD COMPONENT   URINE CULTURE AEROBIC BACTERIAL   ..  Head CT w/o  contrast   Final Result   IMPRESSION:      1. No evidence of acute intracranial hemorrhage, mass, or herniation.   2. There is generalized atrophy of the brain. White matter changes are   present in the cerebral hemispheres that are consistent with small   vessel ischemic disease in this age patient.       BETHANY HUANG MD        .   Treatments provided: fluids  Family Comments: family at bedside  OBS brochure/video discussed/provided to patient:  Yes  ED Medications:   Medications   0.9% sodium chloride BOLUS (500 mLs Intravenous New Bag 2/16/18 1254)     Drips infusing:  No  For the majority of the shift, the patient's behavior Green. Interventions performed were NA.     Severe Sepsis OR Septic Shock Diagnosis Present: No      ED Nurse Name/Phone Number: Kadi Marinelli,   1:51 PM    RECEIVING UNIT ED HANDOFF REVIEW    Above ED Nurse Handoff Report was reviewed: Yes  Reviewed by: Vesna Eagle on February 16, 2018 at 3:23 PM

## 2018-02-16 NOTE — ED PROVIDER NOTES
History     Chief Complaint:  Generalized Weakness and Altered Mental Status    The history is provided by the patient, the EMS personnel and a relative.      Josy Thomson is a 89 year old female with history of myelodysplastic syndrome who presents via EMS with generalized weakness and altered mental status. The patient lives alone at home and her daughter stops by every day to check in on her. EMS reports that the patient's daughter called them in as she was concerned that the patient has become increasingly week and confused over the past several weeks. EMS note that there is a history of 2 separate falls in the month of January but the daughter did not bring the patient in as the patient was able to get up and ambulate normally immediately after the falls. They also note that the patient was seen in clinic on 2/14 and had a UA negative for UTI obtained although no cultures were run.   Here in the ED the patients daughter reports that the patient has a history of low HGB and has been receiving transfusions for the past 2 years. She also notes that the patient has had increased confusion and has been generally weak the past several weeks. When the daughter checked in this morning she noticed that the patient had not eaten the dinner she prepared last night and she did not sleep in her bed, instead she slept on a couch. She also noticed that the patient had a little more difficulty ambulating with her walker. The patient denies any chest pain, fever, cough, shortness of breath, change in appetite, abdominal pain, difficulty urinating, blood in her stools, or other medical concerns.     Allergies:  No known drug allergies     Medications:    Deferoxamine  Hydromorphone  Effexor XR  Prilosec  Hyzaar  Metoprolol    Past Medical History:    Anemia   Arthritis   History of blood transfusion   History of pneumonia   Hypertension   Macular degeneration   myelodysplastic syndrome   Tachycardia     Past Surgical  History:    Bone marrow biopsy, bone specimen - right  ENT surgery  Right shoulder pinning    Family History:    History reviewed. No pertinent family history.      Social History:  Presents via EMS   Tobacco use: Never  Alcohol use: No  PCP: Abeba Bradford    Marital Status:        Review of Systems   Constitutional: Negative for appetite change and fever.   Respiratory: Negative for cough and shortness of breath.    Cardiovascular: Negative for chest pain.   Gastrointestinal: Negative for abdominal pain and blood in stool.   Genitourinary: Negative for difficulty urinating.   Neurological: Positive for weakness.   Psychiatric/Behavioral: Positive for confusion.   All other systems reviewed and are negative.    Physical Exam     Patient Vitals for the past 24 hrs:   BP Temp Temp src Heart Rate SpO2   02/16/18 1315 159/78 - - - -   02/16/18 1300 151/75 - - 97 99 %   02/16/18 1251 161/82 - - 99 92 %   02/16/18 1245 - - - - 97 %   02/16/18 1154 141/83 98.5  F (36.9  C) Temporal 99 94 %      Physical Exam    Gen: alert  HEENT: PERRL, oropharynx clear, mouth dry  Neck: normal ROM  CV: RRR, no murmurs  Pulm: breath sounds equal, lungs clear  Abd: Soft, nontender  Back: no evidence of injury, no cva tenderness  MSK: no deformity, moves all extremities  Skin: diffuse rash- chronic per daughter  Neuro: alert, answers direct questions appropriately, oriented x3, moves all extremities without focal deficit     Emergency Department Course   ECG (11:47:13):  Rate 99 bpm. MT interval 166. QRS duration 136. QT/QTc 370/474. P-R-T axes 63 26 37. Normal sinus rhythm. Right bundle branch block. Possible lateral infarct, age undetermined. Possible inferior infarct, age undetermined. Abnormal ECG. Agree with computer interpretation. Interpreted at 1154 by Carri Jacome MD.     Imaging:  Radiographic findings were communicated with the patient and family who voiced understanding of the findings.  Head CT w/o  Contrast  IMPRESSION:     1. No evidence of acute intracranial hemorrhage, mass, or herniation.   2. There is generalized atrophy of the brain. White matter changes are present in the cerebral hemispheres that are consistent with small vessel ischemic disease in this age patient.     BETHANY HUANG MD    Imaging independently reviewed and agree with radiologist interpretation.     Laboratory:  CBC: WBC 0.8 (LL), HGB 7.0 (L), PLT 40 (LL)  CMP: Glucose 117 (H), BUN 35 (H), Albumin 3.3 (L), Protein Total 6.7 (L), o/w WNL (Creatinine 0.78)   1246: Troponin: <0.015     ABO/Rh type and screen: Type O, Rh Pos, Antibody Neg  Blood component: In process    UA: Mucous Urine Present (A), o/w Negative   Urine Culture: Pending    Interventions:  1254:  mLs IV Bolus   Desferal 1000 mg intromittent infusion IV    Emergency Department Course:  Past medical records, nursing notes, and vitals reviewed.  1135: I performed an exam of the patient and obtained history, as documented above.    1330: I discussed the patient with Dr. Morrissey of Oncology (patient's primary oncologist). Patient's labs are consistent with previous studies.    1357: Findings and plan explained to the Patient and daughter who consent to admission.     1412: Discussed the patient with Agustina Sorensen PA-C, who will admit the patient to an obs bed for further monitoring, evaluation, and treatment.      I personally reviewed the laboratory results with the Patient and daughter and answered all related questions prior to admit.   Impression & Plan    Medical Decision Making:  Josy Thomson is a 89 year old female who presents for generalized weakness and confusion. She has a history of myelodysplastic syndrome. CBC is abnormal today. I consulted with Dr. Morrissey, the patient primary Hematologist. She states that values are near baseline. It is unclear if her HGB is the cause of her confusion. Certainly low. Dr. Morrissey recommended of 1 unit packed red cell  transfusion with desferal. Patient also with some clinical signs of volume depletion so fluid was started. No signs of infectious process on laboratory studies or exam. No chest pain or shortness of breath to suggest cardiac cause. EKG showed right bundle branch block but no ischemic change. Troponin negative. UA negative and no urinary symptoms today. Culture is pending. At this time we will admit for monitoring of confusion as well as monitoring her response to transfusion of fluids. Further work up of confusion deferred to inpatient team. No focal neurologic deficit to suggest CVA. Patient's head CT is negative for intercranial hemorrhage. Plan for admission as noted above.      Diagnosis:    ICD-10-CM   1. Confusion R41.0   2. Anemia, unspecified type D64.9   3. Dehydration E86.0       Disposition:  Admitted to Dr. Waggoner for further evaluation and treatment.      Brannon Fofana  2/16/2018   [facility] EMERGENCY DEPARTMENT  IBrannon, am serving as a scribe at 11:35 AM on 2/16/2018 to document services personally performed by Carri Jacome MD based on my observations and the provider's statements to me.       Carri Jacome MD  02/16/18 5338

## 2018-02-16 NOTE — H&P
[facility] Outpatient / Observation Unit  History and Physical Exam     Josy Thomson MRN# [id number]   YOB: 1927 Age: 89 year old      Date of Admission:  2/16/2018    Primary care provider: Abeba Bradford          Assessment:   Josy Thomson is a 89 year old female with a PMH significant for MDS and chronic pancytopenia, HTN, sinus tachycardia, depression and hx of upper GI bleed, who presents with generalized weakness and increased confusion.  Work up in the ED reveals: VSS. CMP is fairly unremarkable other than BUN 35 and total protein and albumin are low. Troponin <0.015. CBC - WBC 0.8, Hgb 7.0 and platelets 40. UA unremarkable. CT head negative and EKG SR with RBBB. ED spoke with pt's oncologist who states these values are near her baseline but did recommend 1 unit PRBCs and 1 g of Desferal. This was started in the ED. She also received 500 mL NS bolus.   Patient will be registered to Observation for further evaluation and symptom management.     1. Generalized weakness and increased confusion - unclear etiology. Pt is pancytopenic but that is chronic and levels are near baseline. BUN slightly elevated, may be dehydrated and hemo-concentrated and hgb possibly much lower than seen on CBC. Per daughter, Hgb chronically 4-6, requires 2 units PRBCs every 2 weeks. May be seeing an overall progression of disease. No acute infectious process identified on ED work up. CXR not done, may consider if cough increases, develops fever or hypoxia. Pt is orientated to self, place and somewhat to time. Monitor and recheck in AM.   2. Pancytopenia - chronic due to MDS, unlikely to be source on #1. ED spoke with Dr. Morrissey of Oncology, felt labs were near her baseline. Hgb 7.0, recommended 1 unit PRBCs and 1 g of Desferal. This was started in the ED and will complete on the unit. Recheck hgb later tonight and labs in AM.         Plan:     1. [place] to Observation  2.   3. IV hydration with Normal saline, @, 100  ml/hr for 10 hrs  4. Supportive care with anti-emetics, pain meds PRN  5. Regular diet as tolerated  6. Complete transfusion, Follow labs  7. PT consult  8. DVT prophylaxis: pt at low risk, encourage ambulation.    9. SW consult                Chief Complaint:   Generalized weakness, confusion         History of Present Illness:   Josy Thomson is a 89 year old female with a PMH significant for MDS and chronic pancytopenia, HTN, sinus tachycardia, depression and hx of upper GI bleed, who presents with generalized weakness and increased confusion.  The patient is difficult to wake up and the daughter provides the majority of the history.  Daughter reports that until 3 weeks ago the patient was functionally well, independently in her home.  She states she was sharp is intact.  3 weeks ago she began to notice increasing confusion and weakness.  She notes the confusion by the patient stopped taking her home medications regularly.  She has been having a difficult time moving around her home.  She states that she has slept in a recliner the past few nights because she is unable to make it to her bed.  She is unclear if this is due to weakness and/or pain as she has not been taking her home Dilaudid regularly as she used to and she has significant osteoarthritis making it difficult for her to move around.  She notes the patient has not been eating regularly and is moving much slower around her home.  She denies noting any fever, chills, chest pain, shortness of breath, nausea, vomiting, diarrhea, or dysuria the patient denies any symptoms as well.  The daughter did note a cough today when EMS was picking her up and that is new.  The daughter takes meticulous records of the patient's oncology history, noting her lab values and transfusion dates.  She states hemoglobin of 7 is actually really good for the patient, her hemoglobin is typically anywhere from 4-6. She has been transfusion dependent for several years and they  have become much more frequent over time, now requiring transfusions every 2 weeks. The daughter denies any recent medication changes or medications.            Past Medical History:     Past Medical History:   Diagnosis Date     Anemia      Arthritis      History of blood transfusion      History of pneumonia      Hypertension      Macular degeneration      Tachycardia                Past Surgical History:     Past Surgical History:   Procedure Laterality Date     BONE MARROW BIOPSY, BONE SPECIMEN, NEEDLE/TROCAR Right 10/12/2015    Procedure: BIOPSY BONE MARROW;  Surgeon: David Mercado MD;  Location: [facility] OR     ENT SURGERY       ORTHOPEDIC SURGERY  2006    right shoulder pinning               Social History:     Social History     Social History     Marital status:      Spouse name: N/A     Number of children: N/A     Years of education: N/A     Occupational History     Not on file.     Social History Main Topics     Smoking status: Never Smoker     Smokeless tobacco: Never Used     Alcohol use No     Drug use: No     Sexual activity: Not on file     Other Topics Concern     Not on file     Social History Narrative               Family History:   Reviewed and unremarkable.            Allergies:    No Known Allergies            Medications:     Prior to Admission medications    Medication Sig Last Dose Taking? Auth Provider   deferoxamine Inject 1,000 mg into the vein once With blood transfusions   Reported, Patient   HYDROMORPHONE HCL PO    Reported, Patient   cyanocobalamin (VITAMIN B12) 1000 MCG/ML injection Inject 1 mL into the muscle every 30 days   Reported, Patient   DARBEPOETIN KEVIN-POLYSORBATE IJ    Reported, Patient   B Complex Vitamins (B COMPLEX 1 PO) Take by mouth daily   Reported, Patient   VITAMIN MIXTURE PO Take 2 tablets by mouth daily Occuvite for eye health   Reported, Patient   acetaminophen (TYLENOL) 325 MG tablet Take 2 tablets by mouth every 4 hours as needed.   Perlita Macdonald,  MD   venlafaxine (EFFEXOR XR) 75 MG 24 hr capsule Take 75 mg by mouth daily.     Unknown, Entered By History   omeprazole (PRILOSEC) 20 MG capsule Take 40 mg by mouth daily    Unknown, Entered By History   losartan-hydrochlorothiazide (HYZAAR) 100-25 MG per tablet Take 1 tablet by mouth daily.   Unknown, Entered By History   metoprolol (TOPROL-XL) 100 MG 24 hr tablet Take 100 mg by mouth daily.   Unknown, Entered By History   calcium carb 1250 mg, 500 mg [place],/vitamin D 200 units (OSCAL WITH D) 500-200 MG-UNIT per tablet Take 1 tablet by mouth 2 times daily (with meals).   Unknown, Entered By History              Review of Systems:   A Comprehensive greater than 10 system review of systems was carried out.  Pertinent positives and negatives are noted above.  Otherwise negative for contributory information.     Constitutional, neuro, ENT, endocrine, pulmonary, cardiac, gastrointestinal, genitourinary, musculoskeletal, integument and psychiatric systems are negative, except as otherwise noted.         Physical Exam:   Blood pressure 159/78, temperature 98.5  F (36.9  C), temperature source Temporal, SpO2 99 %.  Orthostatic Vitals: pending    GENERAL:  Comfortable. Does not willingly participate in exam.  PSYCH: pleasant, oriented to person and place, No acute distress.  HEENT:  Atraumatic, normocephalic. Purulent drainage noted to right eye, pt not cooperative with further exam, normal hearing, and oropharynx is normal.  NECK:  Supple, no neck vein distention  HEART:  Normal S1, S2 with no murmur, no pericardial rub, gallops or S3 or S4.  LUNGS:  Clear to auscultation, normal Respiratory effort. No wheezing, rales or ronchi.  GI:  Soft, normal bowel sounds. Non-tender, non distended.   EXTREMITIES:  Trace bilateral pedal edema, +2 pulses bilateral and equal.  SKIN:  Dry to touch, No rash, wound or ulcerations.  NEUROLOGIC:  CN 2-12 intact, BL 5/5 symmetric upper and lower extremity strength, sensation is intact with  no focal deficits.              Data:     EKG demonstrates:  Sinus Rhythm, Right Bundle Branch Block.      Recent Labs  Lab 02/16/18  1200   WBC 0.8*   HGB 7.0*   HCT 21.9*   MCV 89   PLT 40*       Recent Labs  Lab 02/16/18  1200      POTASSIUM 3.9   CHLORIDE 103   CO2 31   ANIONGAP 4   *   BUN 35*   CR 0.78   GFRESTIMATED 69   GFRESTBLACK 83   TYREE 9.0   PROTTOTAL 6.7*   ALBUMIN 3.3*   BILITOTAL 0.9   ALKPHOS 101   AST 27   ALT 38       Recent Labs  Lab 02/16/18  1200   TROPI <0.015       Recent Labs  Lab 02/16/18  1252   COLOR Yellow   APPEARANCE Clear   URINEGLC Negative   URINEBILI Negative   URINEKETONE Negative   SG 1.014   UBLD Negative   URINEPH 6.0   PROTEIN Negative   NITRITE Negative   LEUKEST Negative   RBCU 1   WBCU 1         Recent Results (from the past 48 hour(s))   Head CT w/o contrast    Narrative    CT SCAN OF THE HEAD WITHOUT CONTRAST   2/16/2018 12:28 PM     HISTORY:  Confusion. History of fall about 2-3 weeks ago.    TECHNIQUE:  Axial images of the head and coronal reformations without  IV contrast material. Radiation dose for this scan was reduced using  automated exposure control, adjustment of the mA and/or kV according  to patient size, or iterative reconstruction technique.    COMPARISON: Head CT 10/29/2005.    FINDINGS: There is no evidence of intracranial hemorrhage, mass, acute  infarct or anomaly. There is generalized atrophy of the brain. There  is low attenuation in the white matter of the cerebral hemispheres  consistent with sequelae of small vessel ischemic disease.     The visualized portions of the sinuses and mastoids appear normal. The  bony calvarium and bones of the skull base appear intact.       Impression    IMPRESSION:     1. No evidence of acute intracranial hemorrhage, mass, or herniation.  2. There is generalized atrophy of the brain. White matter changes are  present in the cerebral hemispheres that are consistent with small  vessel ischemic disease in  this age patient.     MD Agustina MALIK PA-C

## 2018-02-16 NOTE — PHARMACY-ADMISSION MEDICATION HISTORY
Admission medication history interview status for this patient is complete. See [facility] admission navigator for allergy information, prior to admission medications and immunization status.     Medication history interview source(s):Patient and Family  Medication history resources (including written lists, pill bottles, clinic record):med list  [facility] [address]    Changes made to PTA medication list:  Added: -  Deleted: -  Changed: deferoxamine to 2 gm qow    Actions taken by pharmacist (provider contacted, etc):None     Additional medication history information:None    Medication reconciliation/reorder completed by provider prior to medication history? No    Do you take OTC medications (eg tylenol, ibuprofen, fish oil, eye/ear drops, etc)? yes(Y/N)    For patients on insulin therapy: no (Y/N)  Lantus/levemir/NPH/Mix 70/30 dose:   (Y/N) (see Med list for doses)   Sliding scale Novolog Y/N  If Yes, do you have a baseline novolog pre-meal dose:  units with meals  Patients eat three meals a day:   Y/N    How many episodes of hypoglycemia do you have per week: _______  How many missed doses do you have per week: ______  How many times do you check your blood glucose per day: _______   Any Barriers to therapy - Be specific :  cost of medications, comfortable with giving injections (if applicable), comfortable and confident with current diabetes regimen: Y/N ______________      Prior to Admission medications    Medication Sig Last Dose Taking? Auth Provider   HYDROmorphone (DILAUDID) 2 MG tablet Take 2 mg by mouth every 6 hours as needed for moderate to severe pain .Takes with Tylenol 2/16/2018 at 1000 Yes Unknown, Entered By History   losartan-hydrochlorothiazide (HYZAAR) 50-12.5 MG per tablet Take 1 tablet by mouth daily 2/15/2018 at Unknown time Yes Unknown, Entered By History   Multiple Vitamins-Minerals (OCUVITE PO) Take 1 tablet by mouth 2 times daily 2/15/2018 at Unknown time Yes Unknown, Entered By  History   deferoxamine Inject 2,000 mg into the vein Every 2 weeks With blood transfusions 2/16/2018 at in ED Yes Reported, Patient   cyanocobalamin (VITAMIN B12) 1000 MCG/ML injection Inject 1 mL into the muscle every 30 days Past Week at Unknown time Yes Reported, Patient   DARBEPOETIN KEVIN-POLYSORBATE IJ Inject 4 mLs as directed Every other week 2/5/2018 Yes Reported, Patient   B Complex Vitamins (B COMPLEX 1 PO) Take 1 tablet by mouth daily  2/15/2018 at Unknown time Yes Reported, Patient   acetaminophen (TYLENOL) 325 MG tablet Take 2 tablets by mouth every 4 hours as needed. 2/16/2018 at 1000 Yes Perlita Macdonald MD   venlafaxine (EFFEXOR XR) 75 MG 24 hr capsule Take 75 mg by mouth daily.   2/15/2018 at Unknown time Yes Unknown, Entered By History   omeprazole (PRILOSEC) 20 MG capsule Take 40 mg by mouth daily  2/15/2018 at Unknown time Yes Unknown, Entered By History   metoprolol (TOPROL-XL) 100 MG 24 hr tablet Take 100 mg by mouth daily. 2/15/2018 at Unknown time Yes Unknown, Entered By History   calcium carb 1250 mg, 500 mg [place],/vitamin D 200 units (OSCAL WITH D) 500-200 MG-UNIT per tablet Take 2 tablets by mouth daily  2/15/2018 at Unknown time Yes Unknown, Entered By History

## 2018-02-16 NOTE — IP AVS SNAPSHOT
` ` Patient Information     Patient Name Sex     Josy Thomson ([id number]) Female 1927       Room Bed    [address] [place] [address]      Patient Demographics     Address Phone    [address] [facility] [address] [phone] (Home)  none (Work)  [phone] (Mobile) *Preferred*      Patient Ethnicity & Race     Ethnic Group Patient Race    American White      Emergency Contact(s)     Name Relation Home Work Mobile    ARIEL ALCARAZ Daughter [phone] none [phone]    MYLENE ALCARAZ Relative [phone] NONE [phone]      Documents on File        Status Date Received Description       Documents for the Patient    Privacy Notice - [place] Received 12     Insurance Card Received 12     External Medication Information Consent       Patient ID Received 17 lifetime    Consent for Services - Hospital/Clinic Received () 06/15/12     Consent for EHR Access  13 Copied from existing Consent for services - C/HOD collected on 06/15/2012    [facility] Specified Other       Consent for Services - Hospital/Clinic Received () 10/11/13     Insurance Card Received 01/21/15     Consent for Services - Hospital/Clinic Received () 01/21/15     Advance Directives and Living Will Received 10/13/15 HEALTH CARE DIRECTIVE 2014    Advance Directives and Living Will Not Received  VALIDATION OF AD 2014    Consent for Services/Privacy Notice - Hospital/Clinic Received () 16     HIM DAMON Authorization  16 [employer]    Insurance Card Received 17 MEDICA    Consent for Services/Privacy Notice - Hospital/Clinic Received 17     Care Everywhere Prospective Auth Received 10/18/17     Consent for Services - Hospital/Clinic  (Deleted)      Consent for Services - Hospital/Clinic  (Deleted)      Consent for Services - Hospital/Clinic  (Deleted)      Advance Directives and Living Will Not Received (Deleted)  VALIDATION OF AD 2014       Documents for the  Encounter    CMS IM for Patient Signature Received 02/19/18     Observation Notice Received 02/16/18 Obs ltr/video given per ANITA Eagle RN    CMS THORPE for Patient Signature Received 02/17/18 reviewed w/ Jazmine via phone    CMS IM for Patient Signature Received 02/24/18 2nd      Admission Information     Attending Provider Admitting Provider Admission Type Admission Date/Time    Aron Waggoner MD Young, Aron Teran MD Emergency 02/16/18  1136    Discharge Date Hospital Service Auth/Cert Status Service Area     Observation Incomplete [facility]    Unit Room/Bed Admission Status       [facility] MEDICAL SURGICAL 0523/0523-01 Admission (Confirmed)       Admission     Complaint    Pancytopenia (H), Diarrhea      Hospital Account     Name Acct ID Class Status Primary Coverage    Josy Thomson [id number] Inpatient Open MEDICA - [place] DUAL SOL[employer] [facility] [place] [facility]            Guarantor Account (for Hospital Account #[id number])     Name Relation to Pt Service Area Active? Acct Type    Josy Thomson  FCS Yes Personal/Family    Address Phone          [address] [place] [address] [phone](H)  none(O)              Coverage Information (for Hospital Account #[id number])     F/O Payor/Plan Precert #    MEDICA/MEDICA DUAL SOLUTIONS [facility] [place]FV PARTNERS     Subscriber Subscriber #    Josy Thomson [id number]    Address Phone    [address] [address]  [place] [address] [phone]

## 2018-02-16 NOTE — ED NOTES
Pt arrives via EMS from home d/t generalized weakness and increased confusion for past few weeks. Pt had two falls in January for which she was not seen for. No slurred speech or facial droop. Denies CP, SOB, abd pain, n/v/d. Here, pt is A&O x4. ABC intact. . Pt lives independently and has daughter check on her daily.

## 2018-02-16 NOTE — IP AVS SNAPSHOT
"[place] [address] MEDICAL SURGICAL: [phone]                                              INTERAGENCY TRANSFER FORM - PHYSICIAN ORDERS   2018                    Hospital Admission Date: 2018  TIFFANY MULTANI   : 1927  Sex: Female        Attending Provider: Aron Waggoner MD     Allergies:  No Known Allergies    Infection:  None   Service:  Observation    Ht:  1.499 m (4' 11\")   Wt:  56.9 kg (125 lb 6.4 oz)   Admission Wt:  60.5 kg (133 lb 6.4 oz)    BMI:  25.33 kg/m 2   BSA:  1.54 m 2            Patient PCP Information     Provider PCP Type    Abeba Bradford MD General      ED Clinical Impression     Diagnosis Description Comment Added By Time Added    Confusion [R41.0] Confusion [R41.0]  Carri Jacome MD 2018  2:02 PM    Anemia, unspecified type [D64.9] Anemia, unspecified type [D64.9]  Carri Jacome MD 2018  2:02 PM    Dehydration [E86.0] Dehydration [E86.0]  Carri Jacome MD 2018  2:02 PM      Hospital Problems as of 2018              Priority Class Noted POA    Pancytopenia (H) Medium  2018 Unknown    Generalized weakness Medium  2018 Unknown    Diarrhea Medium  2018 Yes      Non-Hospital Problems as of 2018              Priority Class Noted    Chest pain Medium  2012    Benign hypertension Medium  2012    Hematochezia Medium  6/15/2012    Anemia Medium  2012    GI bleed Medium  2012    Advanced directives, counseling/discussion Medium  2012    MDS (myelodysplastic syndrome) (H) Medium  2015      Code Status History     Date Active Date Inactive Code Status Order ID Comments User Context    2018  4:35 PM 2018  5:13 PM Full Code [id number]  Agustina Sorensen PA-C Inpatient    2012  2:37 PM 2018  4:35 PM Full Code [id number]  Perlita Macdonald MD Outpatient    6/15/2012  9:11 PM 2012  2:37 PM Full Code [id number]  Lisa Alberto MD " ED    2/19/2012  2:41 AM 2/20/2012  6:38 PM Full Code [id number]  Carlo Baumann MD Inpatient         Medication Review      CONTINUE these medications which have NOT CHANGED        Dose / Directions Comments    acetaminophen 325 MG tablet   Commonly known as:  TYLENOL   Used for:  OA (osteoarthritis)        Dose:  650 mg   Take 2 tablets by mouth every 4 hours as needed.   Quantity:  250 tablet   Refills:  0        B COMPLEX 1 PO        Dose:  1 tablet   Take 1 tablet by mouth daily   Refills:  0        Calcium carb-Vitamin D 500 mg [place]-200 units 500-200 MG-UNIT per tablet   Commonly known as:  OSCAL with D;[place] Calcium        Dose:  2 tablet   Take 2 tablets by mouth daily   Refills:  0        cyanocobalamin 1000 MCG/ML injection   Commonly known as:  VITAMIN B12        Dose:  1 mL   Inject 1 mL into the muscle every 30 days   Refills:  0        DARBEPOETIN KEVIN-POLYSORBATE IJ        Dose:  4 mL   Inject 4 mLs as directed Every other week   Refills:  0        deferoxamine        Dose:  2000 mg   Inject 2,000 mg into the vein Every 2 weeks With blood transfusions   Refills:  0        EFFEXOR XR 75 MG 24 hr capsule   Generic drug:  venlafaxine        Dose:  75 mg   Take 75 mg by mouth daily.   Refills:  0        metoprolol succinate 100 MG 24 hr tablet   Commonly known as:  TOPROL-XL        Dose:  100 mg   Take 100 mg by mouth daily.   Refills:  0        OCUVITE PO        Dose:  1 tablet   Take 1 tablet by mouth 2 times daily   Refills:  0        omeprazole 20 MG CR capsule   Commonly known as:  priLOSEC        Dose:  40 mg   Take 40 mg by mouth daily   Refills:  0          STOP taking     DILAUDID 2 MG tablet   Generic drug:  HYDROmorphone           HYZAAR 50-12.5 MG per tablet   Generic drug:  losartan-hydrochlorothiazide                     Further instructions from your care team       NUTRITION DISCHARGE INSTRUCTIONS  - Recommend continue high protein beverage supplements at discharge and while  admitted to TCU.     Summary of Visit     Reason for your hospital stay       You were admitted for treatment of marked generalized weakness and confusion  In part from volume depletion from excessive liquids stools  Work up did not reveal any infection including c difficile  And your frequent stools have cleared up  Your blood pressure was low normal during your stay and one of your medications losartan was discontinued but continue to take toprol 100 mg daily  You seldom used any dilaudid at home and did not use it here and this has been discontinued as it can also contribute to confusion  You did receive one unit of blood for your 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  You are being discharged to TCU to improve your strength so that you can return home             After Care     Activity - Ambulate in hallway       Every shift at this time patient is too weak to walk but start with standing at beside her chair with walker every shift then progress to walking short distances       Activity - Up with assistive device           Activity - Up with nursing assistance           Advance Diet as Tolerated       Follow this diet upon discharge: regular diet and two cans ensure plus daily       Encourage PO fluids       Have patient drink four cups of water a day       Fall precautions           General info for SNF       Length of Stay Estimate: 2 weeks  Condition at Discharge: fair  Level of care:can only stand and walk 2 steps  Rehabilitation Potential: fair  Admission H&P remains valid and up-to-date:yes  Recent Chemotherapy: no  Use Nursing Home Standing Orders: yes             Referrals     Occupational Therapy Adult Consult       Evaluate and treat as clinically indicated.    Reason:  Same as per OT       Physical Therapy Adult Consult       Evaluate and treat as clinically indicated.    Reason:  Admitted for marked weakness, volume depletion and confusion  Now with marked deconditioning can only stand with  walker too weak to walk  Was able to before admission             Follow-Up Appointment Instructions     Future Labs/Procedures    Follow Up and recommended labs and tests     Comments:    Follow up with hematology as previously arranged and with CBC checks and transfusions as before admission      Follow-Up Appointment Instructions     Follow Up and recommended labs and tests       Follow up with hematology as previously arranged and with CBC checks and transfusions as before admission             Statement of Approval     Ordered          02/24/18 1337  I have reviewed and agree with all the recommendations and orders detailed in this document.  EFFECTIVE NOW     Approved and electronically signed by:  Lisa Alberto MD

## 2018-02-16 NOTE — IP AVS SNAPSHOT
"` `           [place] [address] MEDICAL SURGICAL: [phone]                                              INTERAGENCY TRANSFER FORM - NURSING   2018                    Hospital Admission Date: 2018  TIFFANY MULTANI   : 1927  Sex: Female        Attending Provider: Aron Waggnoer MD     Allergies:  No Known Allergies    Infection:  None   Service:  Observation    Ht:  1.499 m (4' 11\")   Wt:  56.9 kg (125 lb 6.4 oz)   Admission Wt:  60.5 kg (133 lb 6.4 oz)    BMI:  25.33 kg/m 2   BSA:  1.54 m 2            Patient PCP Information     Provider PCP Type    Abeba Bradford MD General      Current Code Status     Date Active Code Status Order ID Comments User Context       2018  5:13 PM DNR/DNI [id number]  Agustina Sorensen PA-C Inpatient       Code Status History     Date Active Date Inactive Code Status Order ID Comments User Context    2018  4:35 PM 2018  5:13 PM Full Code [id number]  Agustina Sorensen PA-C Inpatient    2012  2:37 PM 2018  4:35 PM Full Code [id number]  Perlita Macdonald MD Outpatient    6/15/2012  9:11 PM 2012  2:37 PM Full Code [id number]  Lisa Alberto MD ED    2012  2:41 AM 2012  6:38 PM Full Code [id number]  Carlo Baumann MD Inpatient      Advance Directives        Scanned docmt in ACP Activity?           Yes, scanned ACP docmt        Hospital Problems as of 2018              Priority Class Noted POA    Pancytopenia (H) Medium  2018 Unknown    Generalized weakness Medium  2018 Unknown    Diarrhea Medium  2018 Yes      Non-Hospital Problems as of 2018              Priority Class Noted    Chest pain Medium  2012    Benign hypertension Medium  2012    Hematochezia Medium  6/15/2012    Anemia Medium  2012    GI bleed Medium  2012    Advanced directives, counseling/discussion Medium  2012    MDS (myelodysplastic syndrome) (H) Medium  2015      Immunizations     Name Date " "     Influenza (High Dose) 3 valent vaccine 11/13/17     TD (ADULT, 7+) 10/27/12          END      ASSESSMENT     Discharge Profile Flowsheet     EXPECTED DISCHARGE     Resources List  Skilled Nursing Facility 02/21/18 1130    Expected Discharge Date  -- (TBD  > single/home) 02/19/18 0830   [facility]  [facility] [phone], Fax: [phone] 02/23/18 0934    DISCHARGE NEEDS ASSESSMENT     PAS Number  [id number] 02/23/18 0958    Equipment Currently Used at Home  grab bar;shower chair;walker, rolling;walker, standard;wheelchair, manual 02/18/18 1329   SKIN      Transportation Available  family or friend will provide 02/18/18 1329   Inspection of bony prominences  Full 02/24/18 0400    # of Referrals Placed by CTS  External Care Coordination 02/17/18 1024   Full except areas not inspected   Spine 02/23/18 0154    GASTROINTESTINAL (ADULT,PEDIATRIC,OB)     Skin WDL  ex 02/24/18 0400    GI WDL  WDL 02/24/18 0400   Skin Temperature  warm 02/24/18 0400    Abdominal Appearance  nondistended 02/23/18 0839   Skin Moisture  moist 02/24/18 0400    All Quadrants Bowel Sounds  hypoactive 02/24/18 0400   Skin Elasticity  quick return to original state 02/24/18 0400    Last Bowel Movement  02/22/18 02/24/18 0400   Skin Integrity  bruise(s) 02/24/18 0400    Passing flatus  yes 02/24/18 0400   Inspection under devices  Full 02/24/18 0400    COMMUNICATION ASSESSMENT     SAFETY      Patient's communication style  spoken language (English or Bilingual) 02/16/18 1152   Safety WDL  WDL 02/24/18 0400    FINAL RESOURCES     All Alarms  alarm(s) activated and audible 02/24/18 0400                 Assessment WDL (Within Defined Limits) Definitions           Safety WDL     Effective: 09/28/15    Row Information: <b>WDL Definition:</b> Bed in low position, wheels locked; call light in reach; upper side rails up x 2; ID band on<br> <font color=\"gray\"><i>Item=AS safety wdl>>List=AS safety wdl>>Version=F14</i></font>    " "  Skin WDL     Effective: 09/28/15    Row Information: <b>WDL Definition:</b> Warm; dry; intact; elastic; without discoloration; pressure points without redness<br> <font color=\"gray\"><i>Item=AS skin wdl>>List=AS skin wdl>>Version=F14</i></font>      Vitals     Vital Signs Flowsheet     VITAL SIGNS     Pain Descriptors  Aching 02/19/18 0853    Temp  98.6  F (37  C) 02/24/18 0808   Pain Intervention(s)  Medication (See eMAR) (tylenol given) 02/24/18 0351    Temp src  Oral 02/24/18 0808   Response to Interventions  Absence of nonverbal indicators of pain 02/24/18 0351    Resp  18 02/24/18 0808   ANALGESIA SIDE EFFECTS MONITORING      Pulse  98 02/24/18 0038   Side Effects Monitoring: Respiratory Quality  R 02/24/18 0351    Heart Rate  99 02/24/18 0808   Side Effects Monitoring: Respiratory Depth  N 02/24/18 0351    Pulse/Heart Rate Source  Monitor 02/23/18 1623   Side Effects Monitoring: Sedation Level  1 02/23/18 1501    BP  140/52 02/24/18 0808   HEIGHT AND WEIGHT      BP Location  Left arm 02/24/18 0808   Height  1.499 m (4' 11\") 02/18/18 1736    OXYGEN THERAPY     Height Method  Stated 02/18/18 1736    SpO2  93 % 02/24/18 0808   Weight  56.9 kg (125 lb 6.4 oz) 02/23/18 0513    O2 Device  None (Room air) 02/24/18 0808   Weight Method  Bed scale 02/23/18 0513    PAIN/COMFORT     Bed Scale  Standard (fitted sheet, draw sheet/ pad, cover/flat sheet, blanket, two pillows) 02/20/18 0006    Patient Currently in Pain  denies 02/24/18 0808   BSA (Calculated - sq m)  1.59 02/18/18 1736    Preferred Pain Scale  number (Numeric Rating Pain Scale) 02/24/18 0808   BMI (Calculated)  27.02 02/18/18 1736    Patient's Stated Pain Goal  No pain 02/24/18 0808   POSITIONING      0-10 Pain Scale  3 02/18/18 0108   Body Position  independently positioning;turned 02/24/18 0808    PAINAD Breathing  1-->occasional labored breathing, short period of hyperventilation 02/24/18 0351   Head of Bed (HOB)  HOB at 30-45 degrees 02/24/18 0351 "    PAINAD Negative Vocalization  1-->occasional moan/groan: low speech, negative/disapproving quality 02/24/18 0351   Chair  Upright in chair 02/21/18 1350    PAINAD Facial Expression  2-->facial grimacing 02/24/18 0351   Positioning/Transfer Devices  pillows;in use 02/23/18 0843    PAINAD Body Language  1-->tense: distressed pacing, fidgeting 02/24/18 0351   DAILY CARE      PAINAD Consolability  0-->no need to console 02/24/18 0351   Activity Management  activity adjusted per tolerance 02/24/18 0351    PAINAD Score  5 02/24/18 0351   Activity Assistance Provided  assistance, 2 people 02/24/18 0351    Pain Location  Generalized (aches all over) 02/24/18 0351   Assistive Device Utilized  other (see comments) (stacey contreras) 02/24/18 0351    Pain Orientation  Right;Left 02/22/18 2159                 Patient Lines/Drains/Airways Status    Active LINES/DRAINS/AIRWAYS     Name: Placement date: Placement time: Site: Days: Last dressing change:    Peripheral IV 02/23/18 Left Upper forearm 02/23/18   1347   Upper forearm   less than 1     Incision/Surgical Site 10/12/15 Right Buttocks 10/12/15   1123    866             Patient Lines/Drains/Airways Status    Active PICC/CVC     None            Intake/Output Detail Report     Date Intake       Output Net    Shift P.O. I.V. IV Piggyback Blood Components Total Urine Total       Noc 02/22/18 2300 - 02/23/18 0659 100 -- -- -- 100 -- -- 100    Day 02/23/18 0700 - 02/23/18 1459 -- -- -- -- -- -- -- 0    Cayla 02/23/18 1500 - 02/23/18 2259 360 3 -- -- 363 -- -- 363    Noc 02/23/18 2300 - 02/24/18 0659 -- -- -- -- -- -- -- 0    Day 02/24/18 0700 - 02/24/18 1459 -- -- -- -- -- -- -- 0      Last Void/BM       Most Recent Value    Urine Occurrence 1 at 02/23/2018 2019    Stool Occurrence 1 at 02/22/2018 1200      Case Management/Discharge Planning     Case Management/Discharge Planning Flowsheet     REFERRAL INFORMATION     ASSESSMENT OF FAMILY/SOCIAL SUPPORT      Did the Initial Social  Work Assessment result in a Social Work Case?  Yes 02/21/18 1130   Marital Status   02/21/18 1130    Admission Type  inpatient 02/21/18 1130   Who is your support system?  Children 02/21/18 1130    Arrived From  home or self-care 02/21/18 1130   Description of Support System  Supportive;Involved 02/21/18 1130    Referral Source  physician 02/21/18 1130   Support Assessment  Lacks adequate physical care;Lacks necessary supervision and assistance;Caregiver experiencing high stress;Caregiver difficulty providing physical care/safety 02/17/18 1024    New Steerage to  Clinics?  No 02/17/18 1024   Quality of Family Relationships  supportive;involved 02/21/18 1130    # of Referrals Placed by CTS  External Care Coordination 02/17/18 1024   COPING/STRESS      Reason For Consult  discharge planning 02/21/18 1130   Major Change/Loss/Stressor  hospitalization 02/18/18 1731    Record Reviewed  history and physical;medical record 02/21/18 1130   EXPECTED DISCHARGE      CTS Assigned to Case  Polly AMADOR 02/24/18 1128   Expected Discharge Date  -- (TBD  > single/home) 02/19/18 0830    [facility] Name  [facility] 02/21/18 1130   DISCHARGE PLANNING      Primary Care MD Name  Meron Bradford 02/21/18 1130   Transportation Available  family or friend will provide 02/18/18 1329    LIVING ENVIRONMENT     FINAL RESOURCES      Lives With  alone 02/21/18 1130   Equipment Currently Used at Home  grab bar;shower chair;walker, rolling;walker, standard;wheelchair, manual 02/18/18 1329    Living Arrangements  house 02/21/18 1130   Resources List  Skilled Nursing Facility 02/21/18 1130    Provides Primary Care For  no one 02/21/18 1130   [facility]  [facility] [phone], Fax: [phone] 02/23/18 0934    Primary Care Provided By  child(tyree) (specify) 02/17/18 1024   PAS Number  [id number] 02/23/18 0958    Caregiving Concerns  falls/confusion 02/17/18 1024   ABUSE RISK SCREEN      Unique Family Situation   "Daughter Jazmine is PCA 02/17/18 1024   QUESTION TO PATIENT:  Has a member of your family or a partner(now or in the past) intimidated, hurt, manipulated, or controlled you in any way?  no 02/16/18 1153    Quality Of Family Relationships  supportive 02/21/18 1130   QUESTION TO PATIENT: Do you feel safe going back to the place where you are living?  yes 02/16/18 1153    Able to Return to Prior Living Arrangements  -- (pt anxious to return to home) 02/21/18 1130   OBSERVATION: Is there reason to believe there has been maltreatment of a vulnerable adult (ie. Physical/Sexual/Emotional abuse, self neglect, lack of adequate food, shelter, medical care, or financial exploitation)?  no 02/16/18 1153    Living Arrangement Comments  -- (care for a cat) 02/21/18 1130   OTHER      HOME SAFETY     Are you depressed or being treated for depression?  Yes (\"mild\" per daughter) 02/16/18 1726    Patient Feels Safe Living in Home?  yes 02/21/18 1130                 "

## 2018-02-16 NOTE — IP AVS SNAPSHOT
` `     [place] [address] MEDICAL SURGICAL: [phone]                 INTERAGENCY TRANSFER FORM - NOTES (H&P, Discharge Summary, Consults, Procedures, Therapies)   2018                    Hospital Admission Date: 2018  TIFFANY THOMSON   : 1927  Sex: Female        Patient PCP Information     Provider PCP Type    Abeba Bradford MD General         History & Physicals      H&P by Agustina Sorensen PA-C at 2018  2:41 PM     Author:  Agustina Sorensen PA-C Service:  Internal Medicine Author Type:  Physician Assistant - PERRY    Filed:  2018  5:56 PM Date of Service:  2018  2:41 PM Creation Time:  2018  2:40 PM    Status:  Cosign Needed :  Agustina Sorensen PA-C (Physician Assistant - PERRY)    Cosign Required:  Yes             [facility] Outpatient / Observation Unit  History and Physical Exam     Tiffany Thomson MRN# [id number]   YOB: 1927 Age: 89 year old      Date of Admission:  2018    Primary care provider: Abeba Bradford          Assessment:   Tiffany Thomson is a 89 year old female with a PMH significant for[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  Work up in the ED reveals:[AK1.1] VSS. CMP is fairly unremarkable other than BUN 35 and total protein and albumin are low. Troponin <0.015. CBC - WBC 0.8, Hgb 7.0 and platelets 40. UA unremarkable. CT head negative and EKG[AK1.2] SR with RBBB[AK1.4]. ED spoke with pt's oncologist who states these values are near her baseline but did recommend 1 unit PRBCs and 1 g of Desferal. This was started in the ED.[AK1.2] She also received 500 mL NS bolus.[AK1.4]   Patient will be registered to Observation for further evaluation and symptom management.     1. Generalized weakness[AK1.1] and increased confusion[AK1.3] -[AK1.1] unclear etiology. Pt is pancytopenic but that is  chronic and levels are near baseline. BUN slightly elevated, may be dehydrated and hemo-concentrated and hgb possibly much lower than seen on CBC. Per daughter, Hgb chronically 4-6, requires 2 units PRBCs every 2 weeks. May be seeing an overall progression of disease. No acute infectious process identified on ED work up. CXR not done, may consider if cough increases, develops fever or hypoxia. Pt is orientated to self, place and somewhat to time. Monitor and recheck in AM.[AK1.3]   2.[AK1.1] Pancytopenia - chronic due to MDS, unlikely to be source on #1. ED spoke with Dr. Morrissey of Oncology, felt labs were near her baseline. Hgb 7.0, recommended 1 unit PRBCs and 1 g of Desferal. This was started in the ED and will complete on the unit. Recheck hgb later tonight and labs in AM.[AK1.3]         Plan:     1. [place] to Observation  2.   3. IV hydration with[AK1.1] Normal saline, @, 100 ml/hr for 10 hrs[AK1.4]  4. Supportive care with anti-emetics, pain meds PRN  5.[AK1.1] Regular diet[AK1.4] as tolerated  6.[AK1.1] Complete transfusion,[AK1.4] Follow labs  7. PT consult  8. DVT prophylaxis: pt at low risk, encourage ambulation.    9. 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  The patient is difficult to wake up and the daughter provides the majority of the history.  Daughter reports that until 3 weeks ago the patient was functionally well, independently in her home.  She states she was sharp is intact.  3 weeks ago she began to notice increasing confusion and weakness.  She notes the confusion by the patient stopped taking her home medications regularly.  She has been having a difficult time  moving around her home.  She states that she has slept in a recliner the past few nights because she is unable to make it to her bed.  She is unclear if this is due to weakness and/or pain as she has not been taking her home Dilaudid regularly as she used to and she has significant osteoarthritis making it difficult for her to move around.  She notes the patient has not been eating regularly and is moving much slower around her home.  She denies noting any fever, chills, chest pain, shortness of breath, nausea, vomiting, diarrhea, or dysuria the patient denies any symptoms as well.  The daughter did note a cough today when EMS was picking her up and that is new.  The daughter takes meticulous records of the patient's oncology history, noting her lab values and transfusion dates.  She states hemoglobin of 7 is actually really good for the patient, her hemoglobin is typically anywhere from 4-6. She has been transfusion dependent for several years and they have become much more frequent over time, now requiring transfusions every 2 weeks. The daughter denies any recent medication changes or medications.[AK1.3]            Past Medical History:[AK1.1]     Past Medical History:   Diagnosis Date     Anemia      Arthritis      History of blood transfusion      History of pneumonia      Hypertension      Macular degeneration      Tachycardia[AK1.5]                Past Surgical History:[AK1.1]     Past Surgical History:   Procedure Laterality Date     BONE MARROW BIOPSY, BONE SPECIMEN, NEEDLE/TROCAR Right 10/12/2015    Procedure: BIOPSY BONE MARROW;  Surgeon: David Mercado MD;  Location: [facility] OR     ENT SURGERY       ORTHOPEDIC SURGERY  2006    right shoulder pinning[AK1.5]               Social History:[AK1.1]     Social History     Social History     Marital status:      Spouse name: N/A     Number of children: N/A     Years of education: N/A     Occupational History     Not on file.     Social History Main  Topics     Smoking status: Never Smoker     Smokeless tobacco: Never Used     Alcohol use No     Drug use: No     Sexual activity: Not on file     Other Topics Concern     Not on file     Social History Narrative[AK1.5]               Family History:[AK1.1]   Reviewed and unremarkable.[AK1.4]            Allergies:[AK1.1]    No Known Allergies[AK1.5]            Medications:     Prior to Admission medications    Medication Sig Last Dose Taking? Auth Provider   deferoxamine Inject 1,000 mg into the vein once With blood transfusions   Reported, Patient   HYDROMORPHONE HCL PO    Reported, Patient   cyanocobalamin (VITAMIN B12) 1000 MCG/ML injection Inject 1 mL into the muscle every 30 days   Reported, Patient   DARBEPOETIN KEVIN-POLYSORBATE IJ    Reported, Patient   B Complex Vitamins (B COMPLEX 1 PO) Take by mouth daily   Reported, Patient   VITAMIN MIXTURE PO Take 2 tablets by mouth daily Occuvite for eye health   Reported, Patient   acetaminophen (TYLENOL) 325 MG tablet Take 2 tablets by mouth every 4 hours as needed.   Perlita Macdonald MD   venlafaxine (EFFEXOR XR) 75 MG 24 hr capsule Take 75 mg by mouth daily.     Unknown, Entered By History   omeprazole (PRILOSEC) 20 MG capsule Take 40 mg by mouth daily    Unknown, Entered By History   losartan-hydrochlorothiazide (HYZAAR) 100-25 MG per tablet Take 1 tablet by mouth daily.   Unknown, Entered By History   metoprolol (TOPROL-XL) 100 MG 24 hr tablet Take 100 mg by mouth daily.   Unknown, Entered By History   calcium carb 1250 mg, 500 mg Sam[place],/vitamin D 200 units (OSCAL WITH D) 500-200 MG-UNIT per tablet Take 1 tablet by mouth 2 times daily (with meals).   Unknown, Entered By History              Review of Systems:   A Comprehensive greater than 10 system review of systems was carried out.  Pertinent positives and negatives are noted above.  Otherwise negative for contributory information.[AK1.1]     Constitutional, neuro, ENT, endocrine, pulmonary, cardiac,  gastrointestinal, genitourinary, musculoskeletal, integument and psychiatric systems are negative, except as otherwise noted.[AK1.4]         Physical Exam:[AK1.1]   Blood pressure 159/78, temperature 98.5  F (36.9  C), temperature source Temporal, SpO2 99 %.[AK1.5]  Orthostatic Vitals:[AK1.1] pending    GENERAL:  Comfortable.[AK1.4] Does not willingly participate in exam.[AK1.3]  PSYCH: pleasant, oriented[AK1.4] to person and place[AK1.3], No acute distress.  HEENT:  Atraumatic, normocephalic.[AK1.4] Purulent drainage noted to right eye, pt not cooperative with further exam[AK1.3], normal hearing, and oropharynx is normal.  NECK:  Supple, no neck vein distention  HEART:  Normal S1, S2 with no murmur, no pericardial rub, gallops or S3 or S4.  LUNGS:  Clear to auscultation, normal Respiratory effort. No wheezing, rales or ronchi.  GI:  Soft, normal bowel sounds. Non-tender, non distended.   EXTREMITIES:[AK1.4]  Trace bilateral[AK1.3] pedal edema, +2 pulses bilateral and equal.  SKIN:  Dry to touch, No rash, wound or ulcerations.  NEUROLOGIC:  CN 2-12 intact, BL 5/5 symmetric upper and lower extremity strength, sensation is intact with no focal deficits.[AK1.4]              Data:     EKG demonstrates:[AK1.1]  Sinus Rhythm, Right Bundle Branch Block[AK1.4].[AK1.1]      Recent Labs  Lab 02/16/18  1200   WBC 0.8*   HGB 7.0*   HCT 21.9*   MCV 89   PLT 40*       Recent Labs  Lab 02/16/18  1200      POTASSIUM 3.9   CHLORIDE 103   CO2 31   ANIONGAP 4   *   BUN 35*   CR 0.78   GFRESTIMATED 69   GFRESTBLACK 83   TYREE 9.0   PROTTOTAL 6.7*   ALBUMIN 3.3*   BILITOTAL 0.9   ALKPHOS 101   AST 27   ALT 38       Recent Labs  Lab 02/16/18  1200   TROPI <0.015       Recent Labs  Lab 02/16/18  1252   COLOR Yellow   APPEARANCE Clear   URINEGLC Negative   URINEBILI Negative   URINEKETONE Negative   SG 1.014   UBLD Negative   URINEPH 6.0   PROTEIN Negative   NITRITE Negative   LEUKEST Negative   RBCU 1   WBCU 1         Recent  Results (from the past 48 hour(s))   Head CT w/o contrast    Narrative    CT SCAN OF THE HEAD WITHOUT CONTRAST   2/16/2018 12:28 PM     HISTORY:  Confusion. History of fall about 2-3 weeks ago.    TECHNIQUE:  Axial images of the head and coronal reformations without  IV contrast material. Radiation dose for this scan was reduced using  automated exposure control, adjustment of the mA and/or kV according  to patient size, or iterative reconstruction technique.    COMPARISON: Head CT 10/29/2005.    FINDINGS: There is no evidence of intracranial hemorrhage, mass, acute  infarct or anomaly. There is generalized atrophy of the brain. There  is low attenuation in the white matter of the cerebral hemispheres  consistent with sequelae of small vessel ischemic disease.     The visualized portions of the sinuses and mastoids appear normal. The  bony calvarium and bones of the skull base appear intact.       Impression    IMPRESSION:     1. No evidence of acute intracranial hemorrhage, mass, or herniation.  2. There is generalized atrophy of the brain. White matter changes are  present in the cerebral hemispheres that are consistent with small  vessel ischemic disease in this age patient.     BETHANY HUANG MD[AK1.6]       Agustina Sorensen PA-C[AK1.1]       Revision History        User Key Date/Time User Provider Type Action    > AK1.3 2/16/2018  5:56 PM Agustina Sorensen PA-C Physician Assistant - PERRY Sign     AK1.6 2/16/2018  3:41 PM Agustina Sorensen PA-C Physician Assistant - C      AK1.4 2/16/2018  3:28 PM Agustina Sorensen PA-C Physician Assistant - PERRY      AK1.2 2/16/2018  3:19 PM Agustina Sorensen PA-C Physician Assistant - C      AK1.5 2/16/2018  2:41 PM Agustina Sorensen PA-C Physician Assistant - PERRY      AK1.1 2/16/2018  2:40 PM Agustina Sorensen PA-C Physician Assistant - PERRY                   Discharge Summaries     No notes of this type exist for this encounter.         Consult Notes      Consults by  "White, Corinne C, LSW at 2/21/2018 11:36 AM     Author:  White, Corinne C, LSW Service:  (none) Author Type:      Filed:  2/21/2018  3:03 PM Date of Service:  2/21/2018 11:36 AM Creation Time:  2/21/2018 11:31 AM    Status:  Addendum :  White, Corinne C, LSW ()     Consult Orders:    1. Social Work IP Consult [[id number]] ordered by Angie Fuller MD at 02/19/18 0937     2. Social Work IP Consult [[id number]] ordered by Angie Fuller MD at 02/19/18 0922                Care Transition Initial Assessment -   Reason For Consult: discharge planning  Met with: Patient called daughter.  Active Problems:    Pancytopenia (H)    Generalized weakness    Diarrhea       DATA  Lives With: alone  Living Arrangements: house  Description of Support System: Supportive, Involved  Who is your support system?: Children  Support Assessment: Lacks adequate physical care, Lacks necessary supervision and assistance, Caregiver experiencing high stress, Caregiver difficulty providing physical care/safety. PT is fearful of losing her independence.   Identified issues/concerns regarding health management: Pt admitted due to increases weakness and falls.       Resources List: Skilled Nursing Facility  Recommendation are for TCU vs LTC.    SWS will discuss TCU and with family perhaps [facility] for pt following.      Quality Of Family Relationships: supportive  Transportation Available: family or friend will provide    ASSESSMENT  Cognitive Status:  awake and alert- pt is tearful about her situation. She is concerned about her cat while she is gone.. Pt is afraid if she agrees to \" one of those TCU\" that she will not be allowed to return to home.   Concerns to be addressed: had long conversation with pt to address need to be independent with her mobility. Pt is an assist of 1 with the stacey steady at this time  Pt does have infusion at [facility] for Pancytopenia.  PT believes she was due to one and has this done EOW  called " daughter and spoke with her spouse. Daughter will be here later today seeing pt.  .  Daughter helps with cleaning, shopping, driving and meals.   Pt has a life line and uses a walker at home.    PLAN  Will try and speak with daughter about TCU options for dc planning.[CW1.1]       CM:   Met with pt's daughter Jazmine about dc planning. She has been trying to keep pt in her home as long. They have been working with pt' CM Angie [phone] about looking into support at the evening for pt. Josy and her daughter are both focused on trying to get pt home if possible.   SWS will call [facility] and speak with her about plan for her infusion. PT did not need her last infusion with her HGB level above 8.  Will send referrals for TCU for private room for placement.   . [facility] as they have a friend who live , [facility] , [facility] and [facility]  PT was approved for 6 hours week for PCA support.[CW1.2]      Revision History        User Key Date/Time User Provider Type Action    > CW1.2 2/21/2018  3:03 PM White, Corinne C, LSW  Addend     CW1.1 2/21/2018 11:36 AM White, Corinne C, LSW  Sign            Consults by Cassi Domínguez RN at 2/17/2018 10:00 AM     Author:  Cassi Domínguez RN Service:  Skilled Nursing Author Type:      Filed:  2/17/2018 10:41 AM Date of Service:  2/17/2018 10:00 AM Creation Time:  2/17/2018 10:24 AM    Status:  Signed :  Cassi Domínguez RN ()     Consult Orders:    1. Social Work IP Consult [[id number]] ordered by Agustina Sorensen PA-C at 02/16/18 1502                Care Transition Initial Assessment - RN    Reason For Consult: community resources, care coordination/care conference, discharge planning   Met with: Patient and Family.  DATA[AR1.1]   Active Problems:    Pancytopenia (H)    Generalized weakness[AR1.2]       Cognitive Status: awake, alert and confused.   Lives With: alone  Living Arrangements: house  Quality Of Family Relationships: supportive,  helpful, involved  Description of Support System: Supportive, Involved   Who is your support system?: Children   Support Assessment: Lacks adequate physical care, Lacks necessary supervision and assistance, Caregiver experiencing high stress, Caregiver difficulty providing physical care/safety   Insurance concerns: No Insurance issues identified  ASSESSMENT  Patient currently receives the following services:  Daughter is PCA, no other home services.  Pt does have a [employer] SW [place] [phone]        Identified issues/concerns regarding health management: home safety.    Met with patient and daughter, Jazmine at bedside.  Jazmine is patients PCA and provides daily morning cares for patient. She will prep meals for the day and help with bathing as well.  Pt cannot cook for self.  She has advanced macular degeneration and sight is VERY poor and very [place].  Per Jazmine, at baseline pt is very sharp, this new sudden on set of confusion was very concerning for the family. Pt has also had multiple recent falls in the home, pt does have Life Alert but forgets to push button or will push hours later.    There is concern for cargiver fatigue.  Jazmine does all grocery shopping, pays bills, provides transport for pt.  The [employer] SW is in process of finding additional help in the home, plan is possibly getting a PCA for 3 hrs in the evening.      Updated hospitalist.  PT to do eval tomorrow.        PLAN  Patient anticipates discharging to TBD.[AR1.1]     Discharge Planner[AR1.2]   Discharge Plans in progress: TBD.  Pt lives alone, daughter is PCA.   Barriers to discharge plan: PT eval  Plan/Disposition: TBD     Care  (CTS) will continue to follow as needed.    Cassi Domínguez RN,BSN, CTS  [facility]  Care Coordination  [phone][AR1.1]       Revision History        User Key Date/Time User Provider Type Action    > AR1.2 2/17/2018 10:41 AM Cassi Domínguez RN Case Manager Sign     AR1.1  2/17/2018 10:24 AM Cassi Domínguez RN Case Manager                      Progress Notes - Physician (Notes from 02/21/18 through 02/24/18)      Progress Notes by Shakeel Riley RN at 2/23/2018  2:20 PM     Author:  Shakeel Riley RN Service:  (none) Author Type:  Registered Nurse    Filed:  2/24/2018 11:06 AM Date of Service:  2/23/2018  2:20 PM Creation Time:  2/23/2018  2:20 PM    Status:  Addendum :  Shakeel Riley RN (Registered Nurse)         Pt's Hgb dropped 7.1 this morning--1 unit RBC transfusion done and pt tolerated well. Pt had one large formed bm during shift. Coccyx red and pt stated it is tender--barrier cream applied. Plan is for DC tomorrow to TCU if blood counts increase.[LC1.1]    Student Note: Cosigned by CALIN CASTRO[KB1.1]     Revision History        User Key Date/Time User Provider Type Action    > KB1.1 2/24/2018 11:06 AM Shakeel Riley RN Registered Nurse Addend     [N/A] 2/23/2018  2:36 PM Shakeel Riley RN Registered Nurse Sign     LC1.1 2/23/2018  2:31 PM Gosia Marte Nursing Student Sign            Progress Notes by Polly uRiz at 2/24/2018 10:37 AM     Author:  Polly Ruiz Service:  (none) Author Type:      Filed:  2/24/2018 10:41 AM Date of Service:  2/24/2018 10:37 AM Creation Time:  2/24/2018 10:37 AM    Status:  Signed :  Polly Ruiz ()         SW: Writer received call from Kadi at [facility] TCU. She was questioning if pt is aware of the private room fee as she does not appear to have any contact precautions. Writer spoke briefly with RN who reports the pt is neutropenic and should have private room as she would likely die if she had contacted with an ill person. Writer spoke with Kadi at [facility] who spoke with her nursing staff, [facility] does consider this a VALID reason for a private room without the daily fee.     P: Pt to d/c via HE transport 2pm today to [facility] TCU.[KC1.1]      Revision History        User Key  Date/Time User Provider Type Action    > KC1.1 2/24/2018 10:41 AM Polly Ruiz  Sign            Progress Notes by Yudelka Brannon RD, LD at 2/23/2018 11:35 AM     Author:  Yudelka Brannon RD, LD Service:  Nutrition Author Type:  Registered Dietitian    Filed:  2/23/2018  3:00 PM Date of Service:  2/23/2018 11:35 AM Creation Time:  2/23/2018 11:35 AM    Status:  Signed :  Yudelka Brannon RD, LD (Registered Dietitian)         CLINICAL NUTRITION SERVICES  -  ASSESSMENT NOTE    Recommendations Ordered by Registered Dietitian (JEREL):[AK1.1]   Scheduled tomorrows breakfast and supplement shake per pt/dtr request[AK1.2]   Malnutrition:[AK1.1] Severe malnutrition[AK1.2]  In Context of:[AK1.1]  Chronic illness or disease[AK1.2]     REASON FOR ASSESSMENT  Josy Thomson is a 89 year old female seen by Registered Dietitian for LOS    NUTRITION HISTORY[AK1.1]  - Information obtained from patient, daughter, EMR.   - Pt has had reduced appetite/intakes for about 2-3 weeks. Daughter notes likely <50% baseline in this time period.   - Daughter does grocery shopping and noticed that pt has not been eating even her favorite foods recently.   - Usual intake ~2 meals daily, prepared by daughter. Favorites include roast beef on a Hawaiian roll w/ butter, PB and jelly sandwiches, eggs, cheese.   - No high protein oral supplements at home.   - Barriers include decreased appetite, early satiety, and dislike of certain foods, ?taste changes.[AK1.2]     -[AK1.3] Pt lives alone, one daughter visits daily (acts as pt's PCA). Noticed that pt had not eaten the dinner prepared for her the evening before admission. Pt denies change in appetite on admission although daughter reported that Josy had not been eating normally.   - Hx upper GIB, presents for generalized weakness, depression.       CURRENT NUTRITION ORDERS  Diet Order:     Regular + Ensure Plus Shake BID between meals.     Current Intake/Tolerance:  Variable  "intakes over admission recorded[AK1.1]   No meals ordered via Room service since 2/21 lunch.   - Poor appetite charted[AK1.3]  - Per daughter, pt does not like room service foods.   - Drinks 1.5 ensure plus shakes daily (each provides 480 kcal, 15 g pro) though notes that they do fill her up quickly and she is only able to eat small bites of meals.   - Daughter has been bringing food up from cafeteria (soups, sandwiches) though pt has been eating little.[AK1.2]       PHYSICAL FINDINGS  Observed[AK1.1]  Limited exam  Muscle Wasting - mild in temples, dorsal hand. Suspect at least mild generalized muscle loss due to sarcopenia in aging.   Subcutaneous fat loss - orbital region mild[AK1.2]  Obtained from Chart/Interdisciplinary Team[AK1.1]  None noted[AK1.2]    ANTHROPOMETRICS  Height: 4' 11\"  Weight: 125 lbs 6.4 oz (56.9 kg) - trends down over admission  Body mass index is 25.33 kg/(m^2).   Weight Status:  Overweight BMI 25-29.9 --> though appropriate given age  IBW: 44.3 kg  % IBW: 128%  Weight History:[AK1.1] Daughter notes up to an 6-8# loss in 2 weeks (6%).[AK1.2]   Wt Readings from Last 10 Encounters:   02/23/18 56.9 kg (125 lb 6.4 oz)   11/27/17 62.7 kg (138 lb 4.8 oz)   11/17/15 64.9 kg (143 lb)   10/12/15 63.5 kg (140 lb)   10/27/12 77.1 kg (170 lb)   06/15/12 85.3 kg (188 lb)   02/20/12 81.6 kg (179 lb 14.3 oz)   02/18/12 81.6 kg (180 lb)       LABS  Labs reviewed    MEDICATIONS  Medications reviewed    Dosing Weight[AK1.1] 56.9 lg[AK1.2]    ASSESSED NUTRITION NEEDS PER APPROVED PRACTICE GUIDELINES:  Estimated Energy Needs:[AK1.1] 3915-4050[AK1.2] kcals ([AK1.1]25-30 Kcal/Kg[AK1.2])  Justification:[AK1.1] maintenance[AK1.2]  Estimated Protein Needs:[AK1.1] 68-85[AK1.2] grams protein ([AK1.1]1.2-1.5 g pro/Kg[AK1.2])  Justification:[AK1.1] preservation of lean body mass[AK1.2]  Estimated Fluid Needs:[AK1.1] >1[AK1.2] mL[AK1.1]/kcal[AK1.2]   Justification:[AK1.1] maintenance[AK1.2]    MALNUTRITION:  % Weight " Loss:[AK1.1]  > 5% in 1 month (severe malnutrition)[AK1.2]  % Intake:[AK1.1]  </= 50% for >/= 5 days (severe malnutrition)[AK1.2]  Subcutaneous Fat Loss:[AK1.1]  Orbital region mild depletion[AK1.2]  Muscle Loss:[AK1.1]  Temporal region mild depletion and Dorsal hand region mild depletion[AK1.2]  Fluid Retention:[AK1.1]  None noted[AK1.2]    Malnutrition Diagnosis:[AK1.1] Severe malnutrition[AK1.2]  In Context of:[AK1.1]  Chronic illness or disease[AK1.2]    NUTRITION DIAGNOSIS:[AK1.1]  Malnutrition[AK1.2] related to[AK1.1] decreased appetite/intakes and dislike of certain foods a[AK1.2]s evidenced by[AK1.1] pt eating <50% baseline x2 weeks or greater, e/o fat and muscle loss, weight loss of up to 6% in the past two weeks, coding for malnutrition.[AK1.2]       NUTRITION INTERVENTIONS  Recommendations / Nutrition Prescription[AK1.1]  Continue diet per MD + high protein supplement shakes BID[AK1.2]       Implementation  Nutrition education:[AK1.1] Provided education on supplements, protein foods, encouraged bites every couple hours. Encouraged continue shakes at TCU and home.   Medical Food Supplement: adjusted schedule pt pt/family request  Collaboration and Referral of Nutrition care: dietetic intern attendance at rounds.[AK1.2]       Nutrition Goals[AK1.1]  Pt to tolerate at least 50% of adequate meals + 1-2 supplements daily to show improvement in PO intake.[AK1.2]       MONITORING AND EVALUATION:[AK1.1]  Progress towards goals will be monitored and evaluated per protocol and Practice Guidelines      Yudelka Brannon RD, [facility]  3rd floor/ICU: [phone]  All other floors: [phone]  Weekend/holiday: [phone]  Office: [phone][AK1.2]               Revision History        User Key Date/Time User Provider Type Action    > AK1.2 2/23/2018  3:00 PM Yudelka Brannon RD, LD Registered Dietitian Sign     AK1.3 2/23/2018 12:31 PM Yudelka Brannon, RD, LD Registered Dietitian      AK1.1 2/23/2018 11:35 AM Yudelka Brannon,  RD, LD Registered Dietitian             Progress Notes by Lenin Roman at 2/23/2018  9:58 AM     Author:  Lenin Roman Service:  (none) Author Type:  Coordinator    Filed:  2/23/2018  9:58 AM Date of Service:  2/23/2018  9:58 AM Creation Time:  2/23/2018  9:58 AM    Status:  Signed :  Lenin Roman (Coordinator)         Your information has been submitted on February 23rd, 2018 at 09:58:02 AM CST. The confirmation number is [id number][BB1.1]       Revision History        User Key Date/Time User Provider Type Action    > BB1.1 2/23/2018  9:58 AM Lenin Roman Coordinator Sign            Progress Notes by White, Corinne C, LSW at 2/23/2018  9:31 AM     Author:  White, Corinne C, LSW Service:  (none) Author Type:      Filed:  2/23/2018  9:47 AM Date of Service:  2/23/2018  9:31 AM Creation Time:  2/23/2018  9:31 AM    Status:  Addendum :  White, Corinne C, LSW ()         CM: PT not ready to dc today. Plan is to dc tomorrow after she has transfusion . Called [facility] to update MD about plan as well  I/P:[CW1.1]     02/23/18 0900   [facility] [phone], Fax: [phone][CW1.2]   H/E transport set up for sat @[CW1.1] 1400[CW1.3]     Revision History        User Key Date/Time User Provider Type Action    > CW1.3 2/23/2018  9:47 AM White, Corinne C, LSW  Addend     CW1.2 2/23/2018  9:34 AM White, Corinne C, LSW  Sign     CW1.1 2/23/2018  9:31 AM White, Corinne C, LSW              Progress Notes by Mallory Puga MD at 2/23/2018  8:42 AM     Author:  Mallory Puga MD Service:  Hospitalist Author Type:  Physician    Filed:  2/23/2018  8:45 AM Date of Service:  2/23/2018  8:42 AM Creation Time:  2/23/2018  8:42 AM    Status:  Signed :  Mallory Puga MD (Physician)           [facility]  Hospitalist Progress Note  Name: Josy BRENNEN Thomson    MRN:  [id number]  Provider:  Mallory Puga MD  02/23/18    Initial presenting complaint/issue to hospital (Diagnosis): acute encephalopathy.         Assessment and Plan:      Summary of Stay: Josy Thomson is a 89 year old female admitted on 2/16/2018 with acute encephalopathy. Hx of MDS transfusion dependent, HTN, OA.     Problem List:      1. Acute encephalopathy on suspected chronic dementia.  Continues to seem improved.  Oriented to person, place, and time.  This was likely multifactorial.  Try to avoid opiates as much as possible.  Possible viral gastroenteritis.  No obvious bacterial infection.    2. Deconditioning.  PT and OT consults.  3. Vascular congestion.  Patient and family not interested in Echo for further investigation.  Not significantly fluid overloaded on exam.    4. HTN.  On Metoprolol to 50 mg/day with parameters.  5. Depression.  Effexor.  6. GERD.  Continue omeprazole.  7. Myelodysplastic syndrome.  Remains pancytopenic.  Will transfuse 1 unit PRBC with desferal. Follow-up with hematology in outpatient setting.     DVT Prophylaxis: Pneumatic Compression Devices  Code Status: DNR / DNI  Discharge Dispo: Likely TCU.  Estimated Disch Date / # of Days until Disch: 1.                 Interval History:        No fevers/chills/chest pain/SOB. + dizzy.                  Physical Exam:      Last Vital Signs:[AP1.1]  Temp: 98.4  F (36.9  C) Temp src: Oral BP: 136/62 Pulse: 108 Heart Rate: 127 Resp: 20 SpO2: 93 % O2 Device: None (Room air)[AP1.2]      Intake/Output Summary (Last 24 hours) at 02/23/18 0844  Last data filed at 02/23/18 0600   Gross per 24 hour   Intake              300 ml   Output                0 ml   Net              300 ml     I/O last 3 completed shifts:  In: 303 [P.O.:300; I.V.:3]  Out: -   Vitals:    02/18/18 1726 02/19/18 2358 02/21/18 0551 02/22/18 0653   Weight: 60.6 kg (133 lb 8 oz) 60.6 kg (133 lb 11.2 oz) 60.3 kg (132 lb 14.4 oz) 57.5 kg (126 lb 11.2 oz)    02/23/18 0500   Weight:  56.9 kg (125 lb 6.4 oz)[AP1.3]       Gen - Alert, awake, oriented to self, place and time thinks its 1820 though, able to give me her home address. Does not recall name of president.  Lungs - CTA B.  Heart - tachycardic, S1+S2 nml, no m/g/r.  Abd - soft, NT, ND, + BS.  Ext - no edema.  Neuro - speech wml, CN II-XII wnl, MOLINA's, no focal weakness, decreased ROM of UE's.         Medications:      All current medications were reviewed.         Data:      All new lab and imaging data was reviewed.   Labs:[AP1.1]    Recent Labs  Lab 02/16/18  1252   CULT No growth       Recent Labs  Lab 02/23/18  0712 02/22/18  0659 02/19/18  0910   WBC 1.0* 1.1* 1.1*   HGB 7.1* 8.1* 8.0*   HCT 21.6* 24.8* 24.6*   MCV 89 89 87   PLT 36* 44* 45*       Recent Labs  Lab 02/23/18  0712 02/22/18  0659 02/19/18  0910  02/16/18  1200    140 142  < > 138   POTASSIUM 3.7 4.1 3.4  < > 3.9   CHLORIDE 107 105 106  < > 103   CO2 30 33* 31  < > 31   ANIONGAP 5 2* 5  < > 4   * 118* 121*  < > 117*   BUN 27 30 17  < > 35*   CR 0.68 0.88 0.65  < > 0.78   GFRESTIMATED 81 61 85  < > 69   GFRESTBLACK >90 73 >90  < > 83   TYREE 8.7 8.9 8.9  < > 9.0   PROTTOTAL  --   --   --   --  6.7*   ALBUMIN  --   --   --   --  3.3*   BILITOTAL  --   --   --   --  0.9   ALKPHOS  --   --   --   --  101   AST  --   --   --   --  27   ALT  --   --   --   --  38   < > = values in this interval not displayed.[AP1.3]   Recent Imaging:[AP1.1]   No results found for this or any previous visit (from the past 24 hour(s)).[AP1.2]       Revision History        User Key Date/Time User Provider Type Action    > AP1.3 2/23/2018  8:45 AM Mallory Puga MD Physician Sign     AP1.2 2/23/2018  8:43 AM Mallory Puga MD Physician      AP1.1 2/23/2018  8:42 AM Malolry Puga MD Physician             Progress Notes by Shakeel Riley RN at 2/22/2018  2:30 PM     Author:  Shakeel Riley RN Service:  (none) Author Type:  Registered Nurse    Filed:  2/23/2018  7:22 AM Date  of Service:  2/22/2018  2:30 PM Creation Time:  2/22/2018  2:30 PM    Status:  Signed :  Shakeel Riley RN (Registered Nurse)         Coccyx reddened but not open--barrier cream applied. Hgb 8.1, WBC 1.1[LC1.1], platelets 44[LC1.2].[LC1.1] Lung sounds diminished.[LC1.3] Patient had[LC1.1] 2 soft bm's and[LC1.3] one[LC1.1] small[LC1.4] loose[LC1.1] during shift. State[LC1.3]s her stomach feels upset[LC1.1] but denies nausea[LC1.3].[LC1.1] No appetite during shift. Lizzeth[LC1.4]ent transfe[LC1.1]rs wel[LC1.3]l[LC1.1] A1 with[LC1.3] Areli steady.[LC1.1] Plan is to discharge tomorrow to TCU and possibly LTC from there. Patient is having a difficult time coping with this transition.[LC1.3]     Revision History        User Key Date/Time User Provider Type Action    > [N/A] 2/23/2018  7:22 AM Shakeel Riley RN Registered Nurse Sign     LC1.4 2/22/2018  3:00 PM Gosia Marte Nursing Student Sign     LC1.2 2/22/2018  2:38 PM Rosanna, Gosia Nursing Student      LC1.3 2/22/2018  2:35 PM Gosia Marte Nursing Student      LC1.1 2/22/2018  2:30 PM Gosia Marte Nursing Student             Progress Notes by Tree Ag at 2/22/2018  4:28 PM     Author:  Tree Ag Service:  Spiritual Health Author Type:      Filed:  2/22/2018  5:00 PM Date of Service:  2/22/2018  4:28 PM Creation Time:  2/22/2018  4:28 PM    Status:  Signed :  Tree Ag ()         SPIRITUAL HEALTH SERVICES  SPIRITUAL ASSESSMENT Progress Note  FR Med. Surg. 5    PRIMARY FOCUS:     Goals of care    Emotional/spiritual/Hindu distress    Support for coping    ILLNESS CIRCUMSTANCES:   Reviewed documentation. Reflective conversation shared with pt Josy and her daughter Denise which integrated elements of illness and family narratives.     Context of Serious Illness/Symptom(s) - Josy could not tell me why she was in the hospital, but Denise reviewed that she had become quite confused, dehydrated and weak  "at home, after not eating or drinking.  Josy denied this, and Denise tried to convince her that it was true.    Persons/Resources Involved - Joys reported that she has no one.  Denise added that she sees Josy everyday and that Josy's other daughter Sixto, who lives in [place], calls everyday.  Josy also has a son, a cat, and a SW who has been with her for nine years.    DISTRESS:     Emotional/Existential/Relational Distress -   o Josy shared that she is lonely.  She became tearful when she acknowledged that she thinks everyday about loved ones who have  and about activities that she can no longer do, such a lie in the sun and raise show dogs.  Josy reported that nothing gives her hope.  o She asserted that she is \"bored\" here and expressed hesitancy about going to a TCU tomorrow.  Denise narrated that Josy had gone to a TCU after she broke her shoulder ten years ago.    Spiritual/Gnosticist Distress - none directly expressed.  Josy became tearful when she shared that she prays to God ever day and that she does not want people intruding into that private time.  Assured her that [facility] would respect her spirituality.      Social/Cultural/Economic Distress - none named.    SPIRIT (Coping):     Christianity/Daniela - Denise reported that they are Christians, but Josy indicated that her spirituality is private.     Spiritual Practice(s) - prayer    Emotional/Existential/Relational Connections - Josy reported that little gives her tracey, but later acknowledged that Sixto can make her laugh when they talk daily on the phone.  She values her relationship with her SW.    SENSE-MAKING:    Goals of Care - Josy will likely discharge tomorrow to a TCU with the goal of building up her strength in order that she may return home.    Meaning/Sense-Making - Josy and Denise reflected on difficult situations that they have lived through that illustrated how they are survivors.  Josy laughed and cried at some of Denise's stories.    PLAN: No further " plans; I and other chaplains remain available per pt/family request.    Tree Ag M.Div., [facility]  Staff   Pager [phone][BD1.1]     Revision History        User Key Date/Time User Provider Type Action    > BD1.1 2/22/2018  5:00 PM Tree Ag Sign            Progress Notes by Mallory Puga MD at 2/22/2018  9:36 AM     Author:  Mallory Puga MD Service:  Hospitalist Author Type:  Physician    Filed:  2/22/2018  9:41 AM Date of Service:  2/22/2018  9:36 AM Creation Time:  2/22/2018  9:36 AM    Status:  Signed :  Mallory Puga MD (Physician)           [facility]  Hospitalist Progress Note  Name: Josy Thomson    MRN: [id number]  Provider:  Mallory Puga MD  02/22/18    Initial presenting complaint/issue to hospital (Diagnosis): acute encephalopathy.         Assessment and Plan:      Summary of Stay: Josy Thomson is a 89 year old female admitted on 2/16/2018 with acute encephalopathy. Hx of MDS transfusion dependent, HTN, OA.    Problem List:     1. Acute encephalopathy on suspected chronic dementia.  Continues to seem improved.  Oriented to person, place, and time.  This was likely multifactorial.  Try to avoid opiates as much as possible.  Possible viral gastroenteritis.  No obvious bacterial infection.    2. Deconditioning.  PT and OT consults.  3. Vascular congestion.  Patient and family not interested in Echo for further investigation.  Not significantly fluid overloaded on exam.    4. HTN.  BP low today. Decrease Metoprolol to 50 mg/day with parameters.  5. Depression.  Effexor.  6. GERD.  Continue omeprazole.  7. Myelodysplastic syndrome.  Remains pancytopenic.  Follow-up with hematology in outpatient setting.    DVT Prophylaxis: Pneumatic Compression Devices  Code Status: DNR / DNI  Discharge Dispo: Likely TCU.  Estimated Disch Date / # of Days until Disch: 1-2.            Interval History:        No fevers/chills/chest pain/SOB.                   Physical Exam:      Last Vital Signs:  Temp: 97.2  F (36.2  C) Temp src: Oral BP: 130/65 Pulse: 98 Heart Rate: 111 Resp: 16 SpO2: 93 % O2 Device: None (Room air)[AP1.1]      Intake/Output Summary (Last 24 hours) at 02/22/18 0939  Last data filed at 02/22/18 0814   Gross per 24 hour   Intake              753 ml   Output                0 ml   Net              753 ml     I/O last 3 completed shifts:  In: 850 [P.O.:850]  Out: -   Vitals:    02/16/18 1644 02/18/18 1726 02/19/18 2358 02/21/18 0551   Weight: 60.5 kg (133 lb 6.4 oz) 60.6 kg (133 lb 8 oz) 60.6 kg (133 lb 11.2 oz) 60.3 kg (132 lb 14.4 oz)    02/22/18 0653   Weight: 57.5 kg (126 lb 11.2 oz)[AP1.2]       Gen - Alert, awake, oriented to self, place and time thinks its 1820 though, able to give me her home address. Does not recall name of president.  Lungs - CTA B.  Heart - tachycardic, S1+S2 nml, no m/g/r.  Abd - soft, NT, ND, + BS.  Ext - no edema.  Neuro - speech wml, CN II-XII wnl, MOLINA's, no focal weakness, decreased ROM of UE's.         Medications:      All current medications were reviewed.         Data:      All new lab and imaging data was reviewed.   Labs:[AP1.1]    Recent Labs  Lab 02/16/18  1252   CULT No growth       Recent Labs  Lab 02/22/18  0659 02/19/18  0910 02/18/18  0623   WBC 1.1* 1.1* 1.0*   HGB 8.1* 8.0* 7.7*   HCT 24.8* 24.6* 23.3*   MCV 89 87 88   PLT 44* 45* 40*       Recent Labs  Lab 02/22/18  0659 02/19/18  0910 02/18/18  0623  02/16/18  1200    142 141  < > 138   POTASSIUM 4.1 3.4 3.5  < > 3.9   CHLORIDE 105 106 106  < > 103   CO2 33* 31 32  < > 31   ANIONGAP 2* 5 3  < > 4   * 121* 109*  < > 117*   BUN 30 17 16  < > 35*   CR 0.88 0.65 0.70  < > 0.78   GFRESTIMATED 61 85 78  < > 69   GFRESTBLACK 73 >90 >90  < > 83   TYREE 8.9 8.9 8.7  < > 9.0   PROTTOTAL  --   --   --   --  6.7*   ALBUMIN  --   --   --   --  3.3*   BILITOTAL  --   --   --   --  0.9   ALKPHOS  --   --   --   --  101   AST  --   --   --   --  27   ALT  --    --   --   --  38   < > = values in this interval not displayed.[AP1.3]   Recent Imaging:   No results found for this or any previous visit (from the past 24 hour(s)).[AP1.1]       Revision History        User Key Date/Time User Provider Type Action    > AP1.3 2/22/2018  9:41 AM Mallory Puga MD Physician Sign     AP1.2 2/22/2018  9:39 AM Mallory Puga MD Physician      AP1.1 2/22/2018  9:36 AM Mallory Puga MD Physician             Progress Notes by White, Corinne C, LSW at 2/22/2018  7:48 AM     Author:  White, Corinne C, LSW Service:  (none) Author Type:      Filed:  2/22/2018  7:49 AM Date of Service:  2/22/2018  7:48 AM Creation Time:  2/22/2018  7:48 AM    Status:  Signed :  White, Corinne C, LSW ()         Discharge Planner   Discharge Plans in progress: Pt has been accepted for TCU at [facility]  Barriers to discharge plan: need to clarify if this will be a private room   Follow up plan: Following.        Entered by: Corinne C. White 02/22/2018 7:48 AM[CW1.1]          Revision History        User Key Date/Time User Provider Type Action    > CW1.1 2/22/2018  7:49 AM White, Corinne C, LSW  Sign            Progress Notes by Adonis Pérez DO at 2/21/2018  3:19 PM     Author:  Adonis Pérez DO Service:  Hospitalist Author Type:  Physician    Filed:  2/21/2018  3:23 PM Date of Service:  2/21/2018  3:19 PM Creation Time:  2/21/2018  3:19 PM    Status:  Signed :  Adonis Pérez DO (Physician)         [facility]  Hospitalist Progress Note  Adonis Pérez DO 02/21/2018    Reason for Stay (Diagnosis): Acute encephalopathy         Assessment and Plan:      Summary of Stay: Josy Thomson is a 89 year old female admitted on 2/16/2018 with acute encephalopathy    Problem List:   1. Acute encephalopathy on suspected chronic dementia.  Continues to seem improved.  Oriented to person, place, and time.  This was likely multifactorial.   "Try to avoid opiates as much as possible.  Possible viral gastroenteritis.  No obvious bacterial infection.    2. Deconditioning.  PT and OT consults.  3. Vascular congestion.  Patient and family not interested in Echo for further investigation.  Not significantly fluid overloaded on exam.    4. HTN.  BP low today.  Stop Cozaar and HCTZ for now.  Decrease Metoprolol to 50 mg/day with parameters.  5. Depression.  Effexor.  6. GERD.  Continue omeprazole.  7. Myelodysplastic syndrome.  Remains pancytopenic.  Follow-up with hematology in outpatient setting.  DVT Prophylaxis: Pneumatic Compression Devices  Code Status: DNR / DNI  Discharge Dispo: Likely TCU.  Estimated Disch Date / # of Days until Disch: 1-2.        Interval History (Subjective):      Frustrated that she may not be able to go home.  Feels weak.  Denies chest pain, shortness of breath, fevers, chills, nausea, vomiting, or diarrhea.                  Physical Exam:      Last Vital Signs:  BP 92/52 (BP Location: Left arm)  Pulse 79  Temp 97.7  F (36.5  C) (Oral)  Resp 20  Ht 1.499 m (4' 11\")  Wt 60.3 kg (132 lb 14.4 oz)  SpO2 96%  BMI 26.84 kg/m2    Gen:  NAD, A&Ox3.  Eyes:  PERRL, sclera anicteric.  OP:  MMM, no lesions.  Neck:  Supple.  CV:  Regular, no murmurs.  Lung:  CTA b/l, normal effort.  Ab:  +BS, soft.  Skin:  Warm, dry to touch.  No rash.  Ext:  No pitting edema LE b/l.           Medications:      All current medications were reviewed with changes reflected in problem list.         Data:      All new lab and imaging data was reviewed.   Labs:[KR1.1]    Recent Labs  Lab 02/19/18  0910      POTASSIUM 3.4   CHLORIDE 106   CO2 31   ANIONGAP 5   *   BUN 17   CR 0.65   GFRESTIMATED 85   GFRESTBLACK >90   TYREE 8.9       Recent Labs  Lab 02/19/18  0910   WBC 1.1*   HGB 8.0*   HCT 24.6*   MCV 87   PLT 45*[KR1.2]      Imaging:[KR1.1]   No results found for this or any previous visit (from the past 24 hour(s)).[KR1.2]       Revision " History        User Key Date/Time User Provider Type Action    > KR1.2 2/21/2018  3:23 PM Adonis Pérez, DO Physician Sign     KR1.1 2/21/2018  3:19 PM Adonis Pérez, DO Physician                   Procedure Notes     No notes of this type exist for this encounter.      Progress Notes - Therapies (Notes from 02/21/18 through 02/24/18)     No notes of this type exist for this encounter.

## 2018-02-16 NOTE — IP AVS SNAPSHOT
"` `     [place] [address] MEDICAL SURGICAL: [phone]            Medication Administration Report for Josy Thomson as of 02/24/18 1338   Legend:    Given Hold Not Given Due Canceled Entry Other Actions    Time Time (Time) Time  Time-Action       Inactive    Active    Linked        Medications 02/18/18 02/19/18 02/20/18 02/21/18 02/22/18 02/23/18 02/24/18    acetaminophen (TYLENOL) Suppository 650 mg  Dose: 650 mg  Freq: EVERY 4 HOURS PRN Route: RE  PRN Reason: mild pain  Start: 02/16/18 1635   Admin Instructions: Alternate ibuprofen (if ordered) with acetaminophen.  Maximum acetaminophen dose from all sources = 75 mg/kg/day not to exceed 4 grams/day.               acetaminophen (TYLENOL) tablet 650 mg  Dose: 650 mg  Freq: EVERY 4 HOURS PRN Route: PO  PRN Reason: mild pain  Start: 02/16/18 1635   Admin Instructions: Alternate ibuprofen (if ordered) with acetaminophen.  Maximum acetaminophen dose from all sources = 75 mg/kg/day not to exceed 4 grams/day.     0105 (650 mg)-Given          2109 (650 mg)-Given        2159 (650 mg)-Given         0040 (650 mg)-Given           HYDROmorphone (DILAUDID) half-tab 1 mg  Dose: 1 mg  Freq: EVERY 6 HOURS PRN Route: PO  PRN Reason: moderate to severe pain  Start: 02/20/18 1436              lidocaine (LMX4) kit  Freq: EVERY 1 HOUR PRN Route: Top  PRN Reason: pain  PRN Comment: with VAD insertion or accessing implanted port.  Start: 02/16/18 1635   Admin Instructions: Do NOT give if patient has a history of allergy to any local anesthetic or any \"davide\" product.  Apply 30 minutes prior to VAD insertion or port access. MAX Dose: 2.5 g (  of 5 g tube).               lidocaine 1 % 1 mL  Dose: 1 mL  Freq: EVERY 1 HOUR PRN Route: OTHER  PRN Comment: mild pain with VAD insertion or accessing implanted port  Start: 02/16/18 1635   Admin Instructions: Do NOT give if patient has a history of allergy to any local anesthetic or any \"davide\" product. MAX dose 1 mL subcutaneous OR intradermal " in divided doses.               melatonin tablet 1 mg  Dose: 1 mg  Freq: AT BEDTIME PRN Route: PO  PRN Reason: sleep  Start: 02/16/18 1635   Admin Instructions: Do not give unless at least 6 hours of uninterrupted sleep is expected.               metoprolol succinate (TOPROL-XL) 24 hr tablet 50 mg  Dose: 50 mg  Freq: DAILY Route: PO  Start: 02/22/18 0800   Admin Instructions: DO NOT CRUSH. Tablet may be split in half along score line.  Hold for SBP less than 100 or HR less than 60.         0810 (50 mg)-Given        0749 (50 mg)-Given [C]        1007 (50 mg)-Given           naloxone (NARCAN) injection 0.1-0.4 mg  Dose: 0.1-0.4 mg  Freq: EVERY 2 MIN PRN Route: IV  PRN Reason: opioid reversal  Start: 02/16/18 1635   Admin Instructions: For respiratory rate LESS than or EQUAL to 8.  Partial reversal dose:  0.1 mg titrated q 2 minutes for Analgesia Side Effects Monitoring Sedation Level of 3 (frequently drowsy, arousable, drifts to sleep during conversation).Full reversal dose:  0.4 mg bolus for Analgesia Side Effects Monitoring Sedation Level of 4 (somnolent, minimal or no response to stimulation).  For ordered doses up to 2mg give IVP. Give each 0.4mg over 15 seconds in emergency situations. For non-emergent situations further dilute in 9mL of NS to facilitate titration of response.               omeprazole (priLOSEC) CR capsule 40 mg  Dose: 40 mg  Freq: DAILY Route: PO  Start: 02/16/18 1700    0925 (40 mg)-Given        1006 (40 mg)-Given        0901 (40 mg)-Given        0740 (40 mg)-Given        0811 (40 mg)-Given        0749 (40 mg)-Given        1007 (40 mg)-Given           ondansetron (ZOFRAN-ODT) ODT tab 4 mg  Dose: 4 mg  Freq: EVERY 6 HOURS PRN Route: PO  PRN Reasons: nausea,vomiting  Start: 02/16/18 1635   Admin Instructions: This is Step 1 of nausea and vomiting management.  If nausea not resolved in 15 minutes, go to Step 2 prochlorperazine (COMPAZINE). Do not push through foil backing. Peel back foil and  gently remove. Place on tongue immediately. Administration with liquid unnecessary  With dry hands, peel back foil backing and gently remove tablet; do not push oral disintegrating tablet through foil backing; administer immediately on tongue and oral disintegrating tablet dissolves in seconds; then swallow with saliva; liquid not required.              Or  ondansetron (ZOFRAN) injection 4 mg  Dose: 4 mg  Freq: EVERY 6 HOURS PRN Route: IV  PRN Reasons: nausea,vomiting  Start: 02/16/18 1635   Admin Instructions: This is Step 1 of nausea and vomiting management.  If nausea not resolved in 15 minutes, go to Step 2 prochlorperazine (COMPAZINE).  Irritant. For ordered doses up to 4 mg, give IV Push undiluted over 2-5 minutes.               polyethylene glycol (MIRALAX/GLYCOLAX) Packet 17 g  Dose: 17 g  Freq: DAILY PRN Route: PO  PRN Reason: constipation  Start: 02/16/18 1635   Admin Instructions: Give in 8oz of  water, juice, or soda. Hold for loose stools.  This is the second step of a three step constipation treatment.  1 Packet = 17 grams. Mixed prescribed dose in 8 ounces of water. Follow with 8 oz. of water.               sodium chloride (PF) 0.9% PF flush 3 mL  Dose: 3 mL  Freq: EVERY 8 HOURS Route: IK  Start: 02/16/18 1636   Admin Instructions: And Q1H PRN, to lock peripheral IV dormant line.     0508 (3 mL)-Given       0926 (3 mL)-Given       1543 (3 mL)-Given       2235 (3 mL)-Given               1005 (3 mL)-Given       1603 (3 mL)-Given       2353 (3 mL)-Given               1632 (3 mL)-Given        0040 (3 mL)-Given       0735 (3 mL)-Given       1635 (3 mL)-Given        0029 (3 mL)-Given       0814 (3 mL)-Given       1653 (3 mL)-Given       2332 (3 mL)-Given        0747 (3 mL)-Given       1534 (3 mL)-Given        0040 (3 mL)-Given       1007 (3 mL)-Given       [ ] 1600           sodium chloride (PF) 0.9% PF flush 3 mL  Dose: 3 mL  Freq: EVERY 1 HOUR PRN Route: IK  PRN Reason: line flush  Start: 02/16/18 0261    Admin Instructions: for peripheral IV flush post IV meds               venlafaxine (EFFEXOR-ER) 24 hr tablet 75 mg  Dose: 75 mg  Freq: DAILY Route: PO  Start: 02/16/18 1700   Admin Instructions: DO NOT CRUSH.     0925 (75 mg)-Given        1006 (75 mg)-Given        0901 (75 mg)-Given        0740 (75 mg)-Given        0811 (75 mg)-Given        0749 (75 mg)-Given        1007 (75 mg)-Given          Completed Medications  Medications 02/18/18 02/19/18 02/20/18 02/21/18 02/22/18 02/23/18 02/24/18         Dose: 1,000 mg  Freq: ONCE Route: IV  Start: 02/23/18 1030   End: 02/23/18 1442         1442 (1,000 mg)-New Bag           Discontinued Medications  Medications 02/18/18 02/19/18 02/20/18 02/21/18 02/22/18 02/23/18 02/24/18         Dose: 100 mg  Freq: DAILY Route: PO  Start: 02/16/18 1700   End: 02/21/18 1524   Admin Instructions: DO NOT CRUSH. Tablet may be split in half along score line.     0926 (100 mg)-Given        1006 (100 mg)-Given        0901 (100 mg)-Given        (0737)-Not Given       1524-Med Discontinued

## 2018-02-17 NOTE — PLAN OF CARE
Problem: Patient Care Overview  Goal: Plan of Care/Patient Progress Review  Outcome: Improving  PRIMARY DIAGNOSIS: GENERALIZED WEAKNESS/confusion      OUTPATIENT/OBSERVATION GOALS TO BE MET BEFORE DISCHARGE  1. Orthostatic performed: No      2. Tolerating PO medications: Yes      3. Return to near baseline physical activity: No      4. Cleared for discharge by consultants (if involved): No      Discharge Planner Nurse   Safe discharge environment identified: Yes  Barriers to discharge: Yes       Entered by: Vesna Eagle 02/16/2018 5:00PM  Please review provider order for any additional goals.   Nurse to notify provider when observation goals have been met and patient is ready for discharge.      Hgb recheck 8.6. PT/SW to see in am.  Will continue to monitor and provide supportive cares.  Continue fall precautions.

## 2018-02-17 NOTE — CONSULTS
Care Transition Initial Assessment - RN    Reason For Consult: community resources, care coordination/care conference, discharge planning   Met with: Patient and Family.  DATA   Active Problems:    Pancytopenia (H)    Generalized weakness       Cognitive Status: awake, alert and confused.   Lives With: alone  Living Arrangements: house  Quality Of Family Relationships: supportive, helpful, involved  Description of Support System: Supportive, Involved   Who is your support system?: Children   Support Assessment: Lacks adequate physical care, Lacks necessary supervision and assistance, Caregiver experiencing high stress, Caregiver difficulty providing physical care/safety   Insurance concerns: No Insurance issues identified  ASSESSMENT  Patient currently receives the following services:  Daughter is PCA, no other home services.  Pt does have a [employer] SW Angie Phu [phone]        Identified issues/concerns regarding health management: home safety.    Met with patient and daughter, Jazmine at bedside.  Jazmine is patients PCA and provides daily morning cares for patient. She will prep meals for the day and help with bathing as well.  Pt cannot cook for self.  She has advanced macular degeneration and sight is VERY poor and very [place].  Per Jazmine, at baseline pt is very sharp, this new sudden on set of confusion was very concerning for the family. Pt has also had multiple recent falls in the home, pt does have Life Alert but forgets to push button or will push hours later.    There is concern for cargiver fatigue.  Jazmine does all grocery shopping, pays bills, provides transport for pt.  The [employer] SW is in process of finding additional help in the home, plan is possibly getting a PCA for 3 hrs in the evening.      Updated hospitalist.  PT to do eval tomorrow.        PLAN  Patient anticipates discharging to TBD.     Discharge Planner   Discharge Plans in progress: TBD.  Pt lives alone, daughter is PCA.   Barriers to  discharge plan: PT eval  Plan/Disposition: TBD     Care  (CTS) will continue to follow as needed.    Cassi Domínguez RN,BSN, CTS  [facility]  Care Coordination  [phone]

## 2018-02-17 NOTE — PLAN OF CARE
Problem: Patient Care Overview  Goal: Plan of Care/Patient Progress Review  Outcome: No Change  PRIMARY DIAGNOSIS: Confusion  OUTPATIENT/OBSERVATION GOALS TO BE MET BEFORE DISCHARGE:  1. Orthostatic performed: N/A    2. Diagnostic testing complete & at baseline neurologic testing: Yes    3. Cleared by consultants (if involved): N/A    4. Interpretation of cardiac rhythm per telemetry tech: NA    5. Tolerating adequate PO diet and medications: Yes    6. Return to near baseline physical activity or neurologic status: No    Discharge Planner Nurse   Safe discharge environment identified: No  Barriers to discharge: Yes       Entered by: Josefa St 02/17/2018 9:22 AM     Please review provider order for any additional goals.   Nurse to notify provider when observation goals have been met and patient is ready for discharge.    Patient is alert to self only and is confused. Patient complains of a headache this am and was given prn tylenol. Patient has not been up yet so will get into chair for breakfast to see how she moves. Patient lives independently and daughter checks on her daily. PT consult and SW consults in. Breakfast was ordered for patient. Patient will need assistance with feeding-d/t was unable to even take a drink of water without cuing and telling her to suck from straw- and was unable to hold cup. Patient is on neutropenic precautions. Will continue to monitor.

## 2018-02-17 NOTE — PLAN OF CARE
Problem: Patient Care Overview  Goal: Plan of Care/Patient Progress Review  Outcome: No Change  PRIMARY DIAGNOSIS: GENERALIZED WEAKNESS/confusion     OUTPATIENT/OBSERVATION GOALS TO BE MET BEFORE DISCHARGE  1. Orthostatic performed: No     2. Tolerating PO medications: Yes     3. Return to near baseline physical activity: No     4. Cleared for discharge by consultants (if involved): No     Discharge Planner Nurse   Safe discharge environment identified: Yes  Barriers to discharge: Yes       Entered by: Vesna Eagle 02/16/2018 5:00PM  Please review provider order for any additional goals.   Nurse to notify provider when observation goals have been met and patient is ready for discharge.     Hgb recheck 8.6.  Patient was difficult to obtain a lab draw from.  Is more calm now, PT/SW to see in am.  Will continue to monitor and provide supportive cares.  Continue fall precautions.

## 2018-02-17 NOTE — PROGRESS NOTES
[facility]    Hospitalist Progress Note    Date of Service (when I saw the patient): 02/17/2018    Assessment & Plan   Josy Thomson is a 89  year old female with macular degeneration, poor hearing, MDS (transfusion dependent), pancytopenia, HTN, depression, upper GI bleed, iron overload who was admitted on 2/16/2018 with confusion, weakness from unclear cause, now resolved    1. Neuro- confusion seems to have resolved per daughter. Could be due to underlying infection. Does not have dementia at baseline  2. CVS- HTN- cont home meds  3. Pulm- has new cough and crackles on exam. Will obtain CXR and flu swab  4. GI- regular diet with Ensure  5. ID- had fever 100.4 last night. UA clean. No diarrhea. Mild cough. Check flu swab and cxr  6. Renal- not eating well. Will order ivf overnight  7. Endo- not diabetic  8. Heme/onc- MDS requiring chronic transfusions. Iron overload. Received 1 unit here. Pancytopenia is at baseline  9. Musculoskel- PT to see. Has been able  10. Prophylaxis- scds  11. Daughter in room. All questions answered    Addendum: CXR reviewed personally. Pulm vasc congestion. Stop IVF. Will not dose lasix at this time. If SOB or hypoxic overnight, give 20mg IV lasix. Discussed need for ECHO with daughter. No need (she is 89 and we would likely not intervene in any way)    Code Status: DNR/DNI    Disposition: Expected discharge in unclear  Aron Waggoner    Interval History   Patient in bed. Daughter in room. Denies chest pain, sob, abdo pain, n/v/d. States she doesn't each much because she is not hungry. Has mild cough (non-productive).    -Data reviewed today: I reviewed all new labs and imaging results over the last 24 hours. I personally reviewed the chest x-ray image(s) showing pending.    Physical Exam   Temp: 97.1  F (36.2  C) Temp src: Oral BP: 141/58 Pulse: 84 Heart Rate: 94 Resp: 18 SpO2: 90 % O2 Device: None (Room air)    Vitals:    02/16/18 1644   Weight: 60.5 kg (133 lb 6.4 oz)      Vital Signs with Ranges  Temp:  [97.1  F (36.2  C)-100.4  F (38  C)] 97.1  F (36.2  C)  Pulse:  [84] 84  Heart Rate:  [] 94  Resp:  [16-18] 18  BP: (112-161)/(52-83) 141/58  SpO2:  [90 %-99 %] 90 %  I/O last 3 completed shifts:  In: -   Out: 400 [Urine:400]    Constitutional: Awake, alert, cooperative, no apparent distress   Respiratory: Bilateral crackles in bases   Cardiovascular: Regular rate and rhythm, normal S1 and S2, and no murmur noted   Abdomen: Normal bowel sounds, soft, non-distended, non-tender   Skin: No rashes, no cyanosis, dry to touch   Neuro: Alert and oriented x3, no weakness, numbness, memory loss   Extremities: No edema, normal range of motion   Other(s):        All other systems: Negative     Medications     0.9% sodium chloride + KCl 20 mEq/L         sodium chloride (PF)  3 mL Intracatheter Q8H     metoprolol succinate  100 mg Oral Daily     losartan-hydrochlorothiazide  1 tablet Oral Daily     venlafaxine  75 mg Oral Daily     omeprazole  40 mg Oral Daily       Data     Recent Labs  Lab 02/17/18  0607 02/16/18  2045 02/16/18  1200   WBC 0.9*  --  0.8*   HGB 7.6* 8.6* 7.0*   MCV 88  --  89   PLT 43*  --  40*     --  138   POTASSIUM 3.8  --  3.9   CHLORIDE 108  --  103   CO2 30  --  31   BUN 20  --  35*   CR 0.59  --  0.78   ANIONGAP 4  --  4   TYREE 8.3*  --  9.0   GLC 96  --  117*   ALBUMIN  --   --  3.3*   PROTTOTAL  --   --  6.7*   BILITOTAL  --   --  0.9   ALKPHOS  --   --  101   ALT  --   --  38   AST  --   --  27   TROPI  --   --  <0.015       Recent Results (from the past 24 hour(s))   Head CT w/o contrast    Narrative    CT SCAN OF THE HEAD WITHOUT CONTRAST   2/16/2018 12:28 PM     HISTORY:  Confusion. History of fall about 2-3 weeks ago.    TECHNIQUE:  Axial images of the head and coronal reformations without  IV contrast material. Radiation dose for this scan was reduced using  automated exposure control, adjustment of the mA and/or kV according  to patient size, or  iterative reconstruction technique.    COMPARISON: Head CT 10/29/2005.    FINDINGS: There is no evidence of intracranial hemorrhage, mass, acute  infarct or anomaly. There is generalized atrophy of the brain. There  is low attenuation in the white matter of the cerebral hemispheres  consistent with sequelae of small vessel ischemic disease.     The visualized portions of the sinuses and mastoids appear normal. The  bony calvarium and bones of the skull base appear intact.       Impression    IMPRESSION:     1. No evidence of acute intracranial hemorrhage, mass, or herniation.  2. There is generalized atrophy of the brain. White matter changes are  present in the cerebral hemispheres that are consistent with small  vessel ischemic disease in this age patient.     BETHANY HUANG MD

## 2018-02-17 NOTE — PLAN OF CARE
Problem: Patient Care Overview  Goal: Plan of Care/Patient Progress Review  Outcome: No Change  PRIMARY DIAGNOSIS: Confusion  OUTPATIENT/OBSERVATION GOALS TO BE MET BEFORE DISCHARGE:  1. Orthostatic performed: N/A     2. Diagnostic testing complete & at baseline neurologic testing: Yes     3. Cleared by consultants (if involved): N/A     4. Interpretation of cardiac rhythm per telemetry tech: NA     5. Tolerating adequate PO diet and medications: Yes     6. Return to near baseline physical activity or neurologic status: No     Discharge Planner Nurse   Safe discharge environment identified: No  Barriers to discharge: Yes       Entered by: Josefa St 02/17/2018 9:22 AM  Please review provider order for any additional goals.   Nurse to notify provider when observation goals have been met and patient is ready for discharge.     Patient is alert to self only and is confused. Patient complains of a headache this am and was given prn tylenol which was effective. Patient is an assist of 2 to transfer. Patient lives independently and daughter checks on her daily. PT consult and SW consults in. Patient is an assist with feeding.  Patient is on neutropenic precautions. Flu negative.  CXR was ordered.Will continue to monitor.

## 2018-02-17 NOTE — PLAN OF CARE
Problem: Patient Care Overview  Goal: Plan of Care/Patient Progress Review  Outcome: Improving  PRIMARY DIAGNOSIS: GENERALIZED WEAKNESS/confusion      OUTPATIENT/OBSERVATION GOALS TO BE MET BEFORE DISCHARGE  1. Orthostatic performed: No      2. Tolerating PO medications: Yes      3. Return to near baseline physical activity: No      4. Cleared for discharge by consultants (if involved): No      Discharge Planner Nurse   Safe discharge environment identified: Yes  Barriers to discharge: Yes       Entered by: Vesna Eagle 02/16/2018 5:00PM  Please review provider order for any additional goals.   Nurse to notify provider when observation goals have been met and patient is ready for discharge.      Hgb recheck 8.6 after transfusion, recheck labs in am.  PT/SW to see in am.  Will continue to monitor and provide supportive cares.  Continue fall precautions.

## 2018-02-17 NOTE — PLAN OF CARE
Problem: Patient Care Overview  Goal: Plan of Care/Patient Progress Review  PT: Orders received. Per observation rounds; pt not appropriate for PT evaluation at this time. Will reschedule as appropriate.

## 2018-02-17 NOTE — PLAN OF CARE
Problem: Patient Care Overview  Goal: Plan of Care/Patient Progress Review  PRIMARY DIAGNOSIS: GENERALIZED WEAKNESS    OUTPATIENT/OBSERVATION GOALS TO BE MET BEFORE DISCHARGE  1. Orthostatic performed: No    2. Tolerating PO medications: Yes    3. Return to near baseline physical activity: No    4. Cleared for discharge by consultants (if involved): No    Discharge Planner Nurse   Safe discharge environment identified: Yes  Barriers to discharge: Yes       Entered by: Vesna Eagle 02/16/2018 5:00PM     Please review provider order for any additional goals.   Nurse to notify provider when observation goals have been met and patient is ready for discharge.    Pt arrived on unit with 1 unit of RBC infusing and desferal, lethargic, VSS, LS diminished, shallow RR, 1LPM NC for comfort, sats occasionally dropping to 88-89% but pt recovers quickly, BS A&Ax4, passing gas, family at bedside, hgb recheck at 2000, PT/SW to see tomorrow, will continue to monitor and provide supportive cares.

## 2018-02-18 PROBLEM — R19.7 DIARRHEA: Status: ACTIVE | Noted: 2018-01-01

## 2018-02-18 NOTE — PROGRESS NOTES
[facility]    Hospitalist Progress Note    Date of Service (when I saw the patient): 02/18/2018    Assessment & Plan   Josy Thomson is a 89  year old female with macular degeneration, poor hearing, MDS (transfusion dependent), pancytopenia, HTN, depression, upper GI bleed, iron overload who was admitted on 2/16/2018 with confusion, weakness from unclear cause. Now with diarrhea.    1. Neuro- confusion seems to have resolved per daughter. (actually waxes and wanes) Does not have dementia at baseline per daughter  2. CVS- HTN- cont home meds  3. Pulm- had new cough and crackles on exam yesterday. CXR reviewed personally. Pulm vasc congestion. Stopped IVF. Will not dose lasix at this time. If SOB or hypoxic overnight, give 20mg IV lasix. Discussed need for ECHO with daughter. No need (she is 89 and we would likely not intervene in any way). Flu swab negative.   4. GI- regular diet with Ensure (does not like our food), daughter will bring own food  5. ID- UA clean.Had diarrhea yesterday evening. Stool sent to lab. Neg for c diff. Enteric panel pending  6. Renal- no IVF  7. Endo- not diabetic  8. Heme/onc- MDS requiring chronic transfusions. Iron overload. Received 1 unit here. Pancytopenia is at baseline  9. Musculoskel- PT recommending TCU/24 hour care  10. Prophylaxis- scds  11. Daughter in room. All questions answered  12. Dispo- daughter trying to work on 24 hour care at home  13. Admit to inpatient for new diarrhea. Pending stool studies      Code Status: DNR/DNI    Disposition: Expected discharge in unclear  Aron Waggoner    Interval History   Patient chair. Daughter in room. I spent 45 mins speaking to them about the plan. Patient adamant about going home. Actually argues with her daughter while I am in the room. No other acute issues  -Data reviewed today: I reviewed all new labs and imaging results over the last 24 hours. I personally reviewed .    Physical Exam   Temp: 98.4  F (36.9  C) Temp  src: Oral BP: 166/79   Heart Rate: 115 Resp: 18 SpO2: 92 % O2 Device: None (Room air)    Vitals:    02/16/18 1644   Weight: 60.5 kg (133 lb 6.4 oz)     Vital Signs with Ranges  Temp:  [96.9  F (36.1  C)-98.9  F (37.2  C)] 98.4  F (36.9  C)  Heart Rate:  [] 115  Resp:  [18-20] 18  BP: (107-166)/(42-79) 166/79  SpO2:  [92 %-94 %] 92 %  I/O last 3 completed shifts:  In: 120 [P.O.:120]  Out: -     Constitutional: Awake, alert, cooperative, no apparent distress   Respiratory: Crackle in left base   Cardiovascular: Regular rate and rhythm, normal S1 and S2, and no murmur noted   Abdomen: Normal bowel sounds, soft, non-distended, non-tender   Skin: No rashes, no cyanosis, dry to touch   Neuro: Alert and oriented x3, no weakness, numbness, memory loss   Extremities: No edema, normal range of motion   Other(s):        All other systems: Negative     Medications       sodium chloride (PF)  3 mL Intracatheter Q8H     metoprolol succinate  100 mg Oral Daily     losartan-hydrochlorothiazide  1 tablet Oral Daily     venlafaxine  75 mg Oral Daily     omeprazole  40 mg Oral Daily       Data     Recent Labs  Lab 02/18/18  0623 02/17/18  0607 02/16/18  2045 02/16/18  1200   WBC 1.0* 0.9*  --  0.8*   HGB 7.7* 7.6* 8.6* 7.0*   MCV 88 88  --  89   PLT 40* 43*  --  40*    142  --  138   POTASSIUM 3.5 3.8  --  3.9   CHLORIDE 106 108  --  103   CO2 32 30  --  31   BUN 16 20  --  35*   CR 0.70 0.59  --  0.78   ANIONGAP 3 4  --  4   TYREE 8.7 8.3*  --  9.0   * 96  --  117*   ALBUMIN  --   --   --  3.3*   PROTTOTAL  --   --   --  6.7*   BILITOTAL  --   --   --  0.9   ALKPHOS  --   --   --  101   ALT  --   --   --  38   AST  --   --   --  27   TROPI  --   --   --  <0.015       Recent Results (from the past 24 hour(s))   XR Chest 2 Views    Narrative    CHEST TWO VIEWS  2/17/2018 2:15 PM     HISTORY: Fever, crackles.    COMPARISON: 10/27/2012      Impression    IMPRESSION: Cardiomegaly. Pulmonary vascular engorgement.  Linear  scarring in the left lung centrally. Pleural thickening laterally on  the left, unchanged. No acute infiltrates. Flattened diaphragms.    PAMELA BLACK MD

## 2018-02-18 NOTE — PLAN OF CARE
Problem: Patient Care Overview  Goal: Plan of Care/Patient Progress Review  Patient transferred to 5-Med/Surg Oncology Unit with all belongings due to change in status to Inpatient. Patient belongings given to: transport tech.   Report called and given to Betzaida LAYTON 1644..  Family called and updated attempted to call, but no voicemail.

## 2018-02-18 NOTE — PLAN OF CARE
Problem: Patient Care Overview  Goal: Plan of Care/Patient Progress Review    PT:  Orders received, eval complete.  Pt admitted OBS with confusion, weakness from unclear cause.  Pt resides in her own home, with daughter coming daily for ~3 hrs to assist with ADLs and lunch, meds, etc.  Otherwise, appears pt does mobilize on her own when daughter is not there. But, daughter reports significant concern with this as pt does not move well, shuffles feet, hx of falls, remains fairly sedentary.  Has 3 steps to enter home, which daughter always assist pt with getting in/out of the home.    Discharge Planner PT   Patient plan for discharge: pt adamantly wants to return home, daughter in agreement this is not safe  Current status: Significant mobility concerns noted, LOB x 3 during eval, heavy posterior lean with sit <> stands, needing mod A.  CGA ambulating in room x 25ft, but poor foot clearance and significant safety concerns noted.  Deconditioning/weakness evident, pt is at high risk for recurrent falls.  Barriers to return to prior living situation: High fall risk, needing Ax1 to ensure safety, lives alone, stairs to enter home  Recommendations for discharge: TCU, will likely need LTC placement long term  Rationale for recommendations: pt is unsafe to return home living alone at this point, appears she will likely need to consider transitioning to LTC in the future, as she has been physically/cognitively deteriorating over the past several months in general.  She currently requires strict 24/7 care.         Entered by: Edgardo Pavon 02/18/2018 12:34 PM

## 2018-02-18 NOTE — PLAN OF CARE
Problem: Patient Care Overview  Goal: Plan of Care/Patient Progress Review  Outcome: No Change  PRIMARY DIAGNOSIS: GENERALIZED WEAKNESS/confusion    OUTPATIENT/OBSERVATION GOALS TO BE MET BEFORE DISCHARGE  1. Orthostatic performed: N/A    2. Tolerating PO medications: Yes    3. Return to near baseline physical activity: No    4. Cleared for discharge by consultants (if involved): No    Discharge Planner Nurse   Safe discharge environment identified: No  Barriers to discharge: Yes       Entered by: Josefa St 02/18/2018 10:57 AM     Please review provider order for any additional goals.   Nurse to notify provider when observation goals have been met and patient is ready for discharge.    Patient is alert to self and place today but confused with time and situation. Patient is still a heavy assist of 2 and needs cuing for transfers. Patient is up in the chair for breakfast, daughter is in room now. Patient has not had any stools yet nor anymore overnight. Patient's cdiff came back negative-enteric pending. Patient flagged the sepsis protocol this am d/t tachy. Patient denies any pain this am.  Will continue to monitor.

## 2018-02-18 NOTE — PLAN OF CARE
Problem: Patient Care Overview  Goal: Plan of Care/Patient Progress Review  Outcome: No Change  PRIMARY DIAGNOSIS: AMS/weakness  OUTPATIENT/OBSERVATION GOALS TO BE MET BEFORE DISCHARGE:  1. Orthostatic performed: N/A      2. Diagnostic testing complete & at baseline neurologic testing: Yes      3. Cleared by consultants (if involved): N/A      4. Interpretation of cardiac rhythm per telemetry tech: NA      5. Tolerating adequate PO diet and medications: Yes      6. Return to near baseline physical activity or neurologic status: No      Discharge Planner Nurse   Safe discharge environment identified: No  Barriers to discharge: Yes    Please review provider order for any additional goals.   Nurse to notify provider when observation goals have been met and patient is ready for discharge.      Alert to self/place, vitally stable. Afebrile, O2 sats 93% on RA. C/o 3/10 headache, PRN tylenol given. Pt incontinent of b/b. Pt embarrassed of incontinence and was very emotional. Pt assist x2, bed alarm on. Pt needs assist with feedings, ensure given with meals. Will continue to monitor.

## 2018-02-18 NOTE — PLAN OF CARE
Problem: Patient Care Overview  Goal: Plan of Care/Patient Progress Review  Outcome: No Change  PRIMARY DIAGNOSIS: AMS/weakness  OUTPATIENT/OBSERVATION GOALS TO BE MET BEFORE DISCHARGE:  1. Orthostatic performed: N/A      2. Diagnostic testing complete & at baseline neurologic testing: Yes      3. Cleared by consultants (if involved): N/A      4. Interpretation of cardiac rhythm per telemetry tech: NA      5. Tolerating adequate PO diet and medications: Yes      6. Return to near baseline physical activity or neurologic status: No      Discharge Planner Nurse   Safe discharge environment identified: No  Barriers to discharge: Yes    Please review provider order for any additional goals.   Nurse to notify provider when observation goals have been met and patient is ready for discharge.      Alert to self/place, vitally stable. Afebrile, O2 sats 93% on RA. C/o 3/10 headache, PRN tylenol given. Pt incontinent of b/b. No stools overnight. Pt assist x2, bed alarm on. Pt needs assist with feedings, ensure given with meals. Will continue to monitor.

## 2018-02-18 NOTE — PROGRESS NOTES
02/18/18 1137   Quick Adds   Type of Visit Initial PT Evaluation   Living Environment   Lives With alone   Living Arrangements house   Home Accessibility stairs to enter home   Number of Stairs to Enter Home 4   Number of Stairs Within Home 12  (to basement, but does not do)   Stair Railings at Home outside, present on right side   Transportation Available family or friend will provide   Living Environment Comment Patient lives alone in 1-story house with basement that does not use. 4 steps with railing to enter.    Self-Care   Dominant Hand right   Usual Activity Tolerance fair   Current Activity Tolerance fair   Regular Exercise no   Equipment Currently Used at Home grab bar;shower chair;walker, rolling;walker, standard;wheelchair, manual   Activity/Exercise/Self-Care Comment Daughter comes in several hours in the morning and assists with showering, toileting, grooming. When alone patient ambulates with 4WW, unable to properly manage pericares independently.    Functional Level Prior   Ambulation 1-->assistive equipment   Transferring 1-->assistive equipment   Toileting 3-->assistive equipment and person   Bathing 3-->assistive equipment and person   Dressing 3-->assistive equipment and person   Eating 0-->independent   Communication 0-->understands/communicates without difficulty   Swallowing 0-->swallows foods/liquids without difficulty   Cognition 0 - no cognition issues reported   Fall history within last six months yes   Number of times patient has fallen within last six months 2   Which of the above functional risks had a recent onset or change? ambulation;transferring   Prior Functional Level Comment Daughter helps with self cares when there several hours in the morning. Patient ambulates independently with 4WW when alone.    General Information   Onset of Illness/Injury or Date of Surgery - Date 02/16/18   Referring Physician Agustina Sorensen PA-C   Patient/Family Goals Statement Patient would like to  return home   Pertinent History of Current Problem (include personal factors and/or comorbidities that impact the POC) Patient admitted to ED 2/16/18 with generalized weakness and increased confusion; daughter reports these have both increased over several weeks. PMH includes myelodysplastic syndrome, maculary degeneration, [place], and HTN. Patient has recent history of several falls with inability to get up. Lives alone in 1 story with 4 steps to enter, does not use basement. Daughter visits every monring and provides help with self cares. Patient ambulates independently with 4WW for short distances in house.    Precautions/Limitations fall precautions   General Observations Patient sitting in chair and alert upon arrival. Daughter is present for session and provides information on PLOF.   Cognitive Status Examination   Orientation person;place   Level of Consciousness alert   Follows Commands and Answers Questions 100% of the time   Personal Safety and Judgment impaired   Memory intact   Pain Assessment   Patient Currently in Pain No   Posture    Posture Forward head position;Protracted shoulders;Kyphosis   Range of Motion (ROM)   ROM Comment B UE shoulder elevation 0-90 deg; LE ROM WFL   Strength   Strength Comments 3+/5 B shoulder FL, 4/5 B elbow flexion/ext, 3+/5 B hip FL, 4/5 B knee ext, 4/5 R DF, 3+/5 L DF   Bed Mobility   Bed Mobility Comments Supine <> sit modified independent with use of bed rails   Transfer Skills   Transfer Comments Sit <> stand from chair mod A x 1. Patient pulls on 4WW heavily requiring stabilization. Requires very WBOS and several tries to stand.  Posterior LOB upon standing.    Gait   Gait Comments Amb x 30 ft with 4WW and CGA. Poor posture, decreased step length and speed, unsteady at times.   Balance   Balance Comments Impaired balance evidenced by several posterior LOB during transfers.    Sensory Examination   Sensory Perception Comments No sensory deficits reported   Clinical  "Impression   Criteria for Skilled Therapeutic Intervention evaluation only   PT Diagnosis Difficulty with functional mobility   Influenced by the following impairments Impaired balance, generalized weakness, impaired vision   Functional limitations due to impairments Impaired independence/safety with bed mobility, transfers, ambulation, and stairs   Clinical Presentation Stable/Uncomplicated   Clinical Presentation Rationale Medical condition seems to be stable despite multiple issues in [facility]   Clinical Decision Making (Complexity) Low complexity   Anticipated Discharge Disposition Transitional Care Facility   Risk & Benefits of therapy have been explained Yes   Patient, Family & other staff in agreement with plan of care Yes   [facility] TM \"6 Clicks\"   2016, Trustees of [facility], under license to [employer].  All rights reserved.   6 Clicks Short Forms Basic Mobility Inpatient Short Form   [facility]  \"6 Clicks\" V.2 Basic Mobility Inpatient Short Form   1. Turning from your back to your side while in a flat bed without using bedrails? 3 - A Little   2. Moving from lying on your back to sitting on the side of a flat bed without using bedrails? 3 - A Little   3. Moving to and from a bed to a chair (including a wheelchair)? 3 - A Little   4. Standing up from a chair using your arms (e.g., wheelchair, or bedside chair)? 3 - A Little   5. To walk in hospital room? 3 - A Little   6. Climbing 3-5 steps with a railing? 2 - A Lot   Basic Mobility Raw Score (Score out of 24.Lower scores equate to lower levels of function) 17   Total Evaluation Time   Total Evaluation Time (Minutes) 30     "

## 2018-02-18 NOTE — PLAN OF CARE
Problem: Patient Care Overview  Goal: Plan of Care/Patient Progress Review  Outcome: Improving  PRIMARY DIAGNOSIS: GENERALIZED WEAKNESS/confusion     OUTPATIENT/OBSERVATION GOALS TO BE MET BEFORE DISCHARGE  1. Orthostatic performed: N/A     2. Tolerating PO medications: Yes     3. Return to near baseline physical activity: No     4. Cleared for discharge by consultants (if involved): No     Discharge Planner Nurse   Safe discharge environment identified: No  Barriers to discharge: Yes       Entered by: Josefa St 02/18/2018 10:57 AM  Please review provider order for any additional goals.   Nurse to notify provider when observation goals have been met and patient is ready for discharge.     Patient is alert to self and place today but confused with time and situation. Patient is still a heavy assist of 2 and needs cuing for transfers. Daughter is in room now. Patient has not had any stools yet nor anymore overnight. Patient's cdiff came back negative-enteric pending. Patient flagged the sepsis protocol this am d/t tachy-lactic acid was 0.6. Patient denies any pain this am. Pt saw patient is is recommending TCU. SW consult in. Will continue to monitor.

## 2018-02-18 NOTE — PLAN OF CARE
Problem: Patient Care Overview  Goal: Plan of Care/Patient Progress Review  Outcome: No Change  Problem: Patient Care Overview  Goal: Plan of Care/Patient Progress Review  Outcome: No Change  PRIMARY DIAGNOSIS: AMS/weakness  OUTPATIENT/OBSERVATION GOALS TO BE MET BEFORE DISCHARGE:  1. Orthostatic performed: N/A      2. Diagnostic testing complete & at baseline neurologic testing: Yes      3. Cleared by consultants (if involved): N/A      4. Interpretation of cardiac rhythm per telemetry tech: NA      5. Tolerating adequate PO diet and medications: Yes. Needs feeding assitance      6. Return to near baseline physical activity or neurologic status: No. Heavy Ax2      Discharge Planner Nurse   Safe discharge environment identified: No  Barriers to discharge: Yes       Entered by: Josefa St 02/17/2018 9:22 AM  Please review provider order for any additional goals.   Nurse to notify provider when observation goals have been met and patient is ready for discharge.      Patient is alert to self and location. Patient is a heavy assist of 2 to transfer. Patient lives independently and daughter checks on her daily. PT consult and SW consults in. Patient is an assist with feeding.  Patient is on neutropenic precautions. Flu negative.  CXR show atelectasis. Pending C-diff and enteric panel.  Daughter states that she has improved cognitively but weakness persists. Will continue to monitor.

## 2018-02-19 NOTE — PLAN OF CARE
Problem: Patient Care Overview  Goal: Plan of Care/Patient Progress Review  Pt alert to place and self. Up with assist x2 and stacey steady. Incontinent of bladder overnight. Cdiff and enteric panel negative. Protective precautions in place. Will continue to monitor and provide supportive cares.

## 2018-02-19 NOTE — PROGRESS NOTES
[facility]  Hospitalist Progress Note  Angie Fuller MD 02/19/2018    Reason for Stay (Diagnosis): weakness/confusion/diarrhea         Assessment and Plan:      Summary of Stay: Josy Thomson is a 89 year old female with a history of macular degeneration, transfusion depended MDS complicated by iron overload, htn, depression,  Hx of GIB in 2012 admitted on 2/16/2018 with generalized weakness and confusion.     She was initially admitted to observation but then developed substantial diarrhea and so transitioned to inpatient.     Stool studies have been unrevealing and diarrhea persists.  Per PT evaluation, they rec tcu prior to returning to her independent living situation.   Problem List:   1. Diarrhea-enteric panel and c diff negative.  ? Other viral illness in nature.  Had 4 stools yesterday and then none overnight or so far today.  monitor  2. Confusion:  Waxing/waning:  She seems quite depressed as well, and clearly has e/o of at least mild dementiz - etiology likely MF-age/illness/change of environment/suspected dementia and ? Component of depression. cont to monitor.  She does also complain of some intermittent somewhat diffuse pain, I do wonder about some subclinical PMR, so will check an ESR and if > 100 may be reasonable to try some steroids.  Will also check tsh and free t4 for completeness sake  3. Pulmonary vascular congestion-did get a single dose of furosemide yesterday, only about 500 cc out although UO not documented clearly - pulmonary status appears stable without e/o hypoxia.  Echo not completed as patient/dtr would not want any intervention  4. Htn:  On losartan-hctz 50-12.5/metop xl 100 mg qd-both resumed with fair control  5. Chronic pain:  Has low dose prn hydromorphone at home-resumed here without need, pain controlled currently with prn apap  6. Iron overload:  On deferoxamine infusions-rec'd 2/16  7. MDS with pancytopenia:  Transfusion dependent gets every other week  "transfusions and dexfuroxime-rec'd single unit here- no current indication for additional transfusion at this time. Discussed with dtr Jazmine and Dr. Morrissey-just have Jazmine call Dr Morrissey's office at discharge to set up f/u    DVT Prophylaxis: Pneumatic Compression Devices  Code Status: DNR / DNI  # Pain Assessment:   Current Pain Score 2/18/2018 2/18/2018 2/18/2018   Patient currently in pain? denies denies denies   Pain score (0-10) - - -   Pain location - - -   Pain descriptors - - -   Josy taveras pain level was assessed and she currently denies pain.      Discharge Dispo: 24 hour care-either back at her current residence OR TCU  Estimated Disch Date / # of Days until Disch:         Interval History (Subjective):      Denies cp or sob, reports bilateral leg pain but can't define further-denies joint/muscle pain, not skin related either.  Thinks the food is terrible and only interested in the shakes that we have provided but denies n/v.  No more diarrhea overnight an denies abdominal pain.  She is missing her cat                  Physical Exam:      Last Vital Signs:  /66 (BP Location: Right arm)  Pulse 84  Temp 97.8  F (36.6  C) (Oral)  Resp 18  Ht 1.499 m (4' 11\")  Wt 60.6 kg (133 lb 8 oz)  SpO2 92%  BMI 26.96 kg/m2    Intermittently tearful and crying, but recovers quickly, otherwise pleasant and she tries to be cooperative although she is quite hard of hearing and I think her memory is quite a bit off.  She does know she is in the hospital, but can't come up with [place] or the [facility].  Not sure of the day or year, but thinks it's February.  Knows she doesn't like the current president but can't come up with his name or what he looks like otherwise head n/cat sclera clear lungs ctab nl effort no crackles rrr no m/r/g no le edema skin w/d no c/c peripheral pulses intact          Medications:      All current medications were reviewed with changes reflected in problem list.         Data:      All new lab and " imaging data was reviewed.   Labs:    Recent Labs  Lab 02/19/18  0910      POTASSIUM 3.4   CHLORIDE 106   CO2 31   ANIONGAP 5   *   BUN 17   CR 0.65   GFRESTIMATED 85   GFRESTBLACK >90   TYREE 8.9       Recent Labs  Lab 02/19/18  0910   WBC 1.1*   HGB 8.0*   HCT 24.6*   MCV 87   PLT 45*      Imaging:

## 2018-02-19 NOTE — PROVIDER NOTIFICATION
MD Paged: Pts daughter is here requesting you to come to bedside or call into the room.  Pt normally is seen by Dr. Morrissey and has biweekly infusions of Desferal and 2 units of PRBC.

## 2018-02-19 NOTE — PLAN OF CARE
Problem: Patient Care Overview  Goal: Plan of Care/Patient Progress Review  Alert- [facility] VSS-HTN. Incont. Up lift

## 2018-02-20 NOTE — PLAN OF CARE
"Problem: Patient Care Overview  Goal: Plan of Care/Patient Progress Review  Outcome: Improving  Vitals: /60 (BP Location: Left arm)  Pulse 79  Temp 97.2  F (36.2  C) (Oral)  Resp 20  Ht 1.499 m (4' 11\")  Wt 60.6 kg (133 lb 11.2 oz)  SpO2 93%  BMI 27 kg/m2  Pain: Pt denied pain overnight  LOC: Forgetful,   Mobility:Up with an assist of 1  Lungs: Diminished  : Voiding without difficulty  GI: Last BM 2/19/18. Bowel sounds active  IV: Saline locked            "

## 2018-02-20 NOTE — PLAN OF CARE
Problem: Patient Care Overview  Goal: Plan of Care/Patient Progress Review  Outcome: Improving  Pt remains hospitalized.  Getting up 1-2 with Areli Faust. VSS.  Pt awaiting PT consult.  Dtr is agreeable to TCU however, the Pt is not.  1 soft Medium BM this shift.  Voiding adequately, fair appetite.

## 2018-02-20 NOTE — PROGRESS NOTES
"[facility]  Hospitalist Progress Note  Adonis Pérez, DO 02/20/2018    Reason for Stay (Diagnosis): Acute encephalopathy.         Assessment and Plan:      Summary of Stay: Josy Thomson is a 89 year old female admitted on 2/16/2018 with acute encephalopathy    Problem List:   1. Acute encephalopathy on suspected chronic dementia.  Seems improved today.  Oriented to person, place, and time.  This was likely multifactorial.  Try to avoid opiates as much as possible, decrease Dilaudid to 1 mg every 6 hours as needed.Possible viral gastroenteritis.  No obvious bacterial infection.    2. Deconditioning.  PT and OT consults.  3. Vascular congestion.  Patient and family not interested in Echo for further investigation.  Not significantly fluid overloaded on exam.  Already on Cozaar, HCTZ, and Metoprolol.  4. HTN.  Continue Metoprolol, Cozaar, HCTZ.  5. Depression.  Effexor.  6. GERD.  Continue omeprazole.  7. Myelodysplastic syndrome.  Remains pancytopenic.  Follow-up with hematology in outpatient setting.  DVT Prophylaxis: Pneumatic Compression Devices  Code Status: DNR / DNI  Discharge Dispo: Likely TCU, PT and OT consults pending.  Estimated Disch Date / # of Days until Disch: 1.        Interval History (Subjective):      She is frustrated at needing to remain in the hospital.  She does feel weak.  Denies chest pain, shortness of breath, fevers, chills, nausea, vomiting, or any diarrhea today.                  Physical Exam:      Last Vital Signs:  /64  Pulse 79  Temp 98.5  F (36.9  C) (Oral)  Resp 20  Ht 1.499 m (4' 11\")  Wt 60.6 kg (133 lb 11.2 oz)  SpO2 95%  BMI 27 kg/m2    Gen:  NAD, A&Ox3.  Eyes:  PERRL, sclera anicteric.  OP:  MMM, no lesions.  Neck:  Supple.  CV:  Regular, no murmurs.  Lung:  CTA b/l, normal effort.  Ab:  +BS, soft.  Skin:  Warm, dry to touch.  No rash.  Ext:  No pitting edema LE b/l.           Medications:      All current medications were reviewed with changes " reflected in problem list.         Data:      All new lab and imaging data was reviewed.   Labs:    Recent Labs  Lab 02/19/18  0910      POTASSIUM 3.4   CHLORIDE 106   CO2 31   ANIONGAP 5   *   BUN 17   CR 0.65   GFRESTIMATED 85   GFRESTBLACK >90   TYREE 8.9       Recent Labs  Lab 02/19/18  0910   WBC 1.1*   HGB 8.0*   HCT 24.6*   MCV 87   PLT 45*      Imaging:   No results found for this or any previous visit (from the past 24 hour(s)).

## 2018-02-20 NOTE — PROGRESS NOTES
CM observed patient's GONZALO score is elevated/high. SW is following for DC planning. She lives alone with supportive services through her daughter who is her PCA. She has a [facility] ([phone]) who is looking into additional services. There is concern for caregiver fatigue. See CM note from 2/17/2018.     CM will continue to follow patient until discharge for any additional needs.     Glo Potter RN, BSN, CTS  [facility]  [phone]

## 2018-02-20 NOTE — PLAN OF CARE
Problem: Patient Care Overview  Goal: Plan of Care/Patient Progress Review  Outcome: No Change  Pt remains admitted for diarrhea  Pt reported no pain or nausea during shift  No BM during shift  Up to chair and bathroom x1 with stacey steady  Regular diet, fair appetite  Pancytopenia  Will continue to monitor

## 2018-02-20 NOTE — PROGRESS NOTES
CM: [facility] for support [phone]

## 2018-02-21 NOTE — PLAN OF CARE
Problem: Patient Care Overview  Goal: Plan of Care/Patient Progress Review  Outcome: Improving  Ambulatory Status:  Pt up 1 assist with stacey steady. Bed alarms on for safety. Up in chair for meal   Orientation: dis. To situation   VS:  Tachy   Pain:  denies  Resp: LS dim.   GI:  denies nausea.  fair appetite, on regular diet. +BS.  Passing flatus.  Last BM 2/20.  :  Incontinent at times   Skin:  Bruising   Labs:  Sepsis protocol triggered this shift Lactic 1.8  Consults:  PT, OT and SW   Disposition:  tbd   -protective iso for neutropenia

## 2018-02-21 NOTE — PROGRESS NOTES
"[facility]  Hospitalist Progress Note  Adonis Pérez, DO 02/21/2018    Reason for Stay (Diagnosis): Acute encephalopathy         Assessment and Plan:      Summary of Stay: Josy Thomson is a 89 year old female admitted on 2/16/2018 with acute encephalopathy    Problem List:   1. Acute encephalopathy on suspected chronic dementia.  Continues to seem improved.  Oriented to person, place, and time.  This was likely multifactorial.  Try to avoid opiates as much as possible.  Possible viral gastroenteritis.  No obvious bacterial infection.    2. Deconditioning.  PT and OT consults.  3. Vascular congestion.  Patient and family not interested in Echo for further investigation.  Not significantly fluid overloaded on exam.    4. HTN.  BP low today.  Stop Cozaar and HCTZ for now.  Decrease Metoprolol to 50 mg/day with parameters.  5. Depression.  Effexor.  6. GERD.  Continue omeprazole.  7. Myelodysplastic syndrome.  Remains pancytopenic.  Follow-up with hematology in outpatient setting.  DVT Prophylaxis: Pneumatic Compression Devices  Code Status: DNR / DNI  Discharge Dispo: Likely TCU.  Estimated Disch Date / # of Days until Disch: 1-2.        Interval History (Subjective):      Frustrated that she may not be able to go home.  Feels weak.  Denies chest pain, shortness of breath, fevers, chills, nausea, vomiting, or diarrhea.                  Physical Exam:      Last Vital Signs:  BP 92/52 (BP Location: Left arm)  Pulse 79  Temp 97.7  F (36.5  C) (Oral)  Resp 20  Ht 1.499 m (4' 11\")  Wt 60.3 kg (132 lb 14.4 oz)  SpO2 96%  BMI 26.84 kg/m2    Gen:  NAD, A&Ox3.  Eyes:  PERRL, sclera anicteric.  OP:  MMM, no lesions.  Neck:  Supple.  CV:  Regular, no murmurs.  Lung:  CTA b/l, normal effort.  Ab:  +BS, soft.  Skin:  Warm, dry to touch.  No rash.  Ext:  No pitting edema LE b/l.           Medications:      All current medications were reviewed with changes reflected in problem list.         Data:    "   All new lab and imaging data was reviewed.   Labs:    Recent Labs  Lab 02/19/18  0910      POTASSIUM 3.4   CHLORIDE 106   CO2 31   ANIONGAP 5   *   BUN 17   CR 0.65   GFRESTIMATED 85   GFRESTBLACK >90   TYREE 8.9       Recent Labs  Lab 02/19/18  0910   WBC 1.1*   HGB 8.0*   HCT 24.6*   MCV 87   PLT 45*      Imaging:   No results found for this or any previous visit (from the past 24 hour(s)).

## 2018-02-21 NOTE — PLAN OF CARE
Problem: Patient Care Overview  Goal: Plan of Care/Patient Progress Review    PT: New orders received.  Pt admitted OBS 2/16 with confusion, weakness from unclear cause. Was evaluated by PT on 2/18 with detailed note in chart.  Pt resides in her own home, with daughter coming daily for ~3 hrs to assist with ADLs and lunch, meds, etc.  Otherwise, appears pt does mobilize on her own when daughter is not there. But, daughter reports significant concern with this as pt does not move well, shuffles feet, hx of falls, remains fairly sedentary.  Has 3 steps to enter home, which daughter always assist pt with getting in/out of the home.    Discharge Planner PT   Patient plan for discharge: Daughter reports plan for TCU, pt still wants to return home  Current status: POC established, will follow 5x/wk.  Functional weakness evident, but mostly baseline.  Requiring up to mod A with sit <> stand transfers, heavy posterior lean at times, but able to amb x 15ft with FWW and CGA/min A x 1.  L knee pain reported with standing activity, improved ability to lift feet and take steps with her tennis shoes on.  Pt remains at risk for falls. Recommend pt is assisted up to chair with FWW and Ax2 for safety, 3-4x/day.  Barriers to return to prior living situation: lives alone, fall risk, unable to manage ADLs/mobility skills  Recommendations for discharge: TCU, likely need for higher level of care long term  Rationale for recommendations: Pt is unsafe to return home living alone at this point, appears she will likely need to consider transitioning to LTC in the future, as she has been physically/cognitively deteriorating over the past several months in general.  She currently requires strict 24/7 care, and will benefit from ongoing skilled therapy intervention in TCU setting.       Entered by: Edgardo Pavon 02/21/2018 3:45 PM

## 2018-02-21 NOTE — PLAN OF CARE
Problem: Diarrhea (Adult)  Goal: Identify Related Risk Factors and Signs and Symptoms  Related risk factors and signs and symptoms are identified upon initiation of Human Response Clinical Practice Guideline (CPG).   Outcome: Improving  VSS. Afebrile. LS diminished. BS x4. Pt denies pain and nausea. Regular diet. A1 with Areli Steady to ambulate, does well. PIV - SL. Disorientated to time and situation, no change; Alarm on bed for safety. Protective isolation in place. WBC 1.1, ANC 0.6, Hbg 8.0. DC pending, TCU vs Home vs LTC.

## 2018-02-21 NOTE — PLAN OF CARE
Problem: Patient Care Overview  Goal: Plan of Care/Patient Progress Review  Outcome: Improving  Pt remains hospitalized.  Getting up 1 with Areli Steady. BPs soft this.  Pt awaiting placement. 2 soft BMs this shift.  Voiding adequately, poor appetite.

## 2018-02-21 NOTE — PLAN OF CARE
Problem: Patient Care Overview  Goal: Plan of Care/Patient Progress Review  Discharge Planner OT   Patient plan for discharge: unknown   Current status: Pt admitted with encephalopathy and confusion on 2/16/18. Pt previously resided alone in her own home. She received assistance from her daughter for ADL's/IADL's daily. Pt is mod A with supine>sit transfers and CGA-Min A with FWW. Pt needing A as she does at her baseline for ADL's.  Barriers to return to prior living situation: decreased strength, endurance and confusion and pt resides alone  Recommendations for discharge: Defer to PT  Rationale for recommendations: Pt requires increased level of assist for mobility and not safe to d/c to home. No skilled OT IP indicated and will defer OT to next level of care. OT orders completed.        Entered by: Hanny De La Vega 02/21/2018 4:57 PM

## 2018-02-22 NOTE — PROGRESS NOTES
Coccyx reddened but not open--barrier cream applied. Hgb 8.1, WBC 1.1, platelets 44. Lung sounds diminished. Patient had 2 soft bm's and one small loose during shift. States her stomach feels upset but denies nausea. No appetite during shift. Patient transfers well A1 with Areli steady. Plan is to discharge tomorrow to TCU and possibly LTC from there. Patient is having a difficult time coping with this transition.

## 2018-02-22 NOTE — PROGRESS NOTES
[facility]  Hospitalist Progress Note  Name: Josy Thomson    MRN: [id number]  Provider:  Mallory Puga MD  02/22/18    Initial presenting complaint/issue to hospital (Diagnosis): acute encephalopathy.         Assessment and Plan:      Summary of Stay: Josy Thomson is a 89 year old female admitted on 2/16/2018 with acute encephalopathy. Hx of MDS transfusion dependent, HTN, OA.    Problem List:     1. Acute encephalopathy on suspected chronic dementia.  Continues to seem improved.  Oriented to person, place, and time.  This was likely multifactorial.  Try to avoid opiates as much as possible.  Possible viral gastroenteritis.  No obvious bacterial infection.    2. Deconditioning.  PT and OT consults.  3. Vascular congestion.  Patient and family not interested in Echo for further investigation.  Not significantly fluid overloaded on exam.    4. HTN.  BP low today. Decrease Metoprolol to 50 mg/day with parameters.  5. Depression.  Effexor.  6. GERD.  Continue omeprazole.  7. Myelodysplastic syndrome.  Remains pancytopenic.  Follow-up with hematology in outpatient setting.    DVT Prophylaxis: Pneumatic Compression Devices  Code Status: DNR / DNI  Discharge Dispo: Likely TCU.  Estimated Disch Date / # of Days until Disch: 1-2.            Interval History:        No fevers/chills/chest pain/SOB.                  Physical Exam:      Last Vital Signs:  Temp: 97.2  F (36.2  C) Temp src: Oral BP: 130/65 Pulse: 98 Heart Rate: 111 Resp: 16 SpO2: 93 % O2 Device: None (Room air)      Intake/Output Summary (Last 24 hours) at 02/22/18 0939  Last data filed at 02/22/18 0814   Gross per 24 hour   Intake              753 ml   Output                0 ml   Net              753 ml     I/O last 3 completed shifts:  In: 850 [P.O.:850]  Out: -   Vitals:    02/16/18 1644 02/18/18 1726 02/19/18 2358 02/21/18 0551   Weight: 60.5 kg (133 lb 6.4 oz) 60.6 kg (133 lb 8 oz) 60.6 kg (133 lb 11.2 oz) 60.3 kg (132 lb 14.4 oz)     02/22/18 0653   Weight: 57.5 kg (126 lb 11.2 oz)       Gen - Alert, awake, oriented to self, place and time thinks its 1820 though, able to give me her home address. Does not recall name of president.  Lungs - CTA B.  Heart - tachycardic, S1+S2 nml, no m/g/r.  Abd - soft, NT, ND, + BS.  Ext - no edema.  Neuro - speech wml, CN II-XII wnl, MOLINA's, no focal weakness, decreased ROM of UE's.         Medications:      All current medications were reviewed.         Data:      All new lab and imaging data was reviewed.   Labs:    Recent Labs  Lab 02/16/18  1252   CULT No growth       Recent Labs  Lab 02/22/18 0659 02/19/18  0910 02/18/18  0623   WBC 1.1* 1.1* 1.0*   HGB 8.1* 8.0* 7.7*   HCT 24.8* 24.6* 23.3*   MCV 89 87 88   PLT 44* 45* 40*       Recent Labs  Lab 02/22/18 0659 02/19/18  0910 02/18/18  0623  02/16/18  1200    142 141  < > 138   POTASSIUM 4.1 3.4 3.5  < > 3.9   CHLORIDE 105 106 106  < > 103   CO2 33* 31 32  < > 31   ANIONGAP 2* 5 3  < > 4   * 121* 109*  < > 117*   BUN 30 17 16  < > 35*   CR 0.88 0.65 0.70  < > 0.78   GFRESTIMATED 61 85 78  < > 69   GFRESTBLACK 73 >90 >90  < > 83   TYREE 8.9 8.9 8.7  < > 9.0   PROTTOTAL  --   --   --   --  6.7*   ALBUMIN  --   --   --   --  3.3*   BILITOTAL  --   --   --   --  0.9   ALKPHOS  --   --   --   --  101   AST  --   --   --   --  27   ALT  --   --   --   --  38   < > = values in this interval not displayed.   Recent Imaging:   No results found for this or any previous visit (from the past 24 hour(s)).

## 2018-02-22 NOTE — PLAN OF CARE
Problem: Diarrhea (Adult)  Goal: Identify Related Risk Factors and Signs and Symptoms  Related risk factors and signs and symptoms are identified upon initiation of Human Response Clinical Practice Guideline (CPG).   Outcome: Improving  VSS. Afebrile. LS diminished. BS hypoactive. Pt denies pain and nausea. Regular diet. Heavy A2 with walker and gait belt, pivot to bedside commode. PIV - SL. Protective isolation in place. Disorientated to time and situation, Alarm on bed for safety. Slept well. DC pending, TCU.

## 2018-02-22 NOTE — PLAN OF CARE
Problem: Patient Care Overview  Goal: Plan of Care/Patient Progress Review  Outcome: Improving  Ambulatory Status:  Pt up 2 assist with walker and gait belt. Bed alarms for safety. Repositioned in bed   Orientation: dis. Time and situation   VS:  Tmax 99.1, tachy   Pain:  denies  Resp: LS dim.   GI:  Poor appetite, on regular diet-declined dinner. +BS.  Passing flatus.  Last BM 2/20.  :  Incontinent at times   Skin:  Bruising   Consults:  PT, OT and SW   Disposition:  TBD   -protective iso for neutropenia

## 2018-02-22 NOTE — PROGRESS NOTES
Discharge Planner   Discharge Plans in progress: Pt has been accepted for TCU at [facility]  Barriers to discharge plan: need to clarify if this will be a private room   Follow up plan: Following.        Entered by: Corinne C. White 02/22/2018 7:48 AM

## 2018-02-22 NOTE — PROGRESS NOTES
"SPIRITUAL HEALTH SERVICES  SPIRITUAL ASSESSMENT Progress Note  [facility] Med. Surg. 5    PRIMARY FOCUS:     Goals of care    Emotional/spiritual/Temple distress    Support for coping    ILLNESS CIRCUMSTANCES:   Reviewed documentation. Reflective conversation shared with pt Josy and her daughter Denise which integrated elements of illness and family narratives.     Context of Serious Illness/Symptom(s) - Josy could not tell me why she was in the hospital, but Denise reviewed that she had become quite confused, dehydrated and weak at home, after not eating or drinking.  Josy denied this, and Denise tried to convince her that it was true.    Persons/Resources Involved - Josy reported that she has no one.  Denise added that she sees Josy everyday and that Josy's other daughter Sixto, who lives in [place], calls everyday.  Josy also has a son, a cat, and a SW who has been with her for nine years.    DISTRESS:     Emotional/Existential/Relational Distress -   o Josy shared that she is lonely.  She became tearful when she acknowledged that she thinks everyday about loved ones who have  and about activities that she can no longer do, such a lie in the sun and raise show dogs.  Josy reported that nothing gives her hope.  o She asserted that she is \"bored\" here and expressed hesitancy about going to a TCU tomorrow.  Denise narrated that Josy had gone to a TCU after she broke her shoulder ten years ago.    Spiritual/Faith Distress - none directly expressed.  Josy became tearful when she shared that she prays to God ever day and that she does not want people intruding into that private time.  Assured her that [facility] would respect her spirituality.      Social/Cultural/Economic Distress - none named.    SPIRIT (Coping):     Rastafarian/Daniela - Denise reported that they are Christians, but Josy indicated that her spirituality is private.     Spiritual Practice(s) - prayer    Emotional/Existential/Relational Connections - Josy reported that " little gives her tracey, but later acknowledged that Sixto can make her laugh when they talk daily on the phone.  She values her relationship with her SW.    SENSE-MAKING:    Goals of Care - Josy will likely discharge tomorrow to a TCU with the goal of building up her strength in order that she may return home.    Meaning/Sense-Making - Josy and Denise reflected on difficult situations that they have lived through that illustrated how they are survivors.  Josy laughed and cried at some of Denise's stories.    PLAN: No further plans; I and other chaplains remain available per pt/family request.    Tree Ag M.Div., [facility]  Staff   Pager [phone]

## 2018-02-23 NOTE — PLAN OF CARE
PT: Noted pts decreased Hgb of 7.1 therefore PT held for today. PT discharge recommendation continues to be for TCU.

## 2018-02-23 NOTE — PROGRESS NOTES
Your information has been submitted on February 23rd, 2018 at 09:58:02 AM [facility]. The confirmation number is [id number]

## 2018-02-23 NOTE — PLAN OF CARE
"Problem: Patient Care Overview  Goal: Plan of Care/Patient Progress Review  Outcome: Improving  /66 (BP Location: Left arm)  Pulse 98  Temp 97  F (36.1  C) (Oral)  Resp 20  Ht 1.499 m (4' 11\")  Wt 56.9 kg (125 lb 6.4 oz)  SpO2 97%  BMI 25.33 kg/m2  LS diminished. BS hypoactive. Pt denies pain and nausea, OR SOB. Regular diet. Heavy A2 with walker and gait belt, pivot to bedside commode.  Protective isolation in place. Disorientated to time and situation, Alarm on bed for safety. Slept well. DC today is the goal toTCU.      "

## 2018-02-23 NOTE — PROGRESS NOTES
Pt's Hgb dropped 7.1 this morning--1 unit RBC transfusion done and pt tolerated well. Pt had one large formed bm during shift. Coccyx red and pt stated it is tender--barrier cream applied. Plan is for DC tomorrow to TCU if blood counts increase.    Student Note: Cosigned by CALIN CASTRO

## 2018-02-23 NOTE — PROGRESS NOTES
[facility]  Hospitalist Progress Note  Name: Josy Thomson    MRN: [id number]  Provider:  Mallory Puga MD  02/23/18    Initial presenting complaint/issue to hospital (Diagnosis): acute encephalopathy.         Assessment and Plan:      Summary of Stay: Josy Thomson is a 89 year old female admitted on 2/16/2018 with acute encephalopathy. Hx of MDS transfusion dependent, HTN, OA.     Problem List:      1. Acute encephalopathy on suspected chronic dementia.  Continues to seem improved.  Oriented to person, place, and time.  This was likely multifactorial.  Try to avoid opiates as much as possible.  Possible viral gastroenteritis.  No obvious bacterial infection.    2. Deconditioning.  PT and OT consults.  3. Vascular congestion.  Patient and family not interested in Echo for further investigation.  Not significantly fluid overloaded on exam.    4. HTN.  On Metoprolol to 50 mg/day with parameters.  5. Depression.  Effexor.  6. GERD.  Continue omeprazole.  7. Myelodysplastic syndrome.  Remains pancytopenic.  Will transfuse 1 unit PRBC with desferal. Follow-up with hematology in outpatient setting.     DVT Prophylaxis: Pneumatic Compression Devices  Code Status: DNR / DNI  Discharge Dispo: Likely TCU.  Estimated Disch Date / # of Days until Disch: 1.                 Interval History:        No fevers/chills/chest pain/SOB. + dizzy.                  Physical Exam:      Last Vital Signs:  Temp: 98.4  F (36.9  C) Temp src: Oral BP: 136/62 Pulse: 108 Heart Rate: 127 Resp: 20 SpO2: 93 % O2 Device: None (Room air)      Intake/Output Summary (Last 24 hours) at 02/23/18 0844  Last data filed at 02/23/18 0600   Gross per 24 hour   Intake              300 ml   Output                0 ml   Net              300 ml     I/O last 3 completed shifts:  In: 303 [P.O.:300; I.V.:3]  Out: -   Vitals:    02/18/18 1726 02/19/18 2358 02/21/18 0551 02/22/18 0653   Weight: 60.6 kg (133 lb 8 oz) 60.6 kg (133 lb 11.2 oz) 60.3 kg  (132 lb 14.4 oz) 57.5 kg (126 lb 11.2 oz)    02/23/18 0500   Weight: 56.9 kg (125 lb 6.4 oz)       Gen - Alert, awake, oriented to self, place and time thinks its 1820 though, able to give me her home address. Does not recall name of president.  Lungs - CTA B.  Heart - tachycardic, S1+S2 nml, no m/g/r.  Abd - soft, NT, ND, + BS.  Ext - no edema.  Neuro - speech wml, CN II-XII wnl, MOLINA's, no focal weakness, decreased ROM of UE's.         Medications:      All current medications were reviewed.         Data:      All new lab and imaging data was reviewed.   Labs:    Recent Labs  Lab 02/16/18  1252   CULT No growth       Recent Labs  Lab 02/23/18  0712 02/22/18  0659 02/19/18  0910   WBC 1.0* 1.1* 1.1*   HGB 7.1* 8.1* 8.0*   HCT 21.6* 24.8* 24.6*   MCV 89 89 87   PLT 36* 44* 45*       Recent Labs  Lab 02/23/18  0712 02/22/18  0659 02/19/18  0910  02/16/18  1200    140 142  < > 138   POTASSIUM 3.7 4.1 3.4  < > 3.9   CHLORIDE 107 105 106  < > 103   CO2 30 33* 31  < > 31   ANIONGAP 5 2* 5  < > 4   * 118* 121*  < > 117*   BUN 27 30 17  < > 35*   CR 0.68 0.88 0.65  < > 0.78   GFRESTIMATED 81 61 85  < > 69   GFRESTBLACK >90 73 >90  < > 83   TYREE 8.7 8.9 8.9  < > 9.0   PROTTOTAL  --   --   --   --  6.7*   ALBUMIN  --   --   --   --  3.3*   BILITOTAL  --   --   --   --  0.9   ALKPHOS  --   --   --   --  101   AST  --   --   --   --  27   ALT  --   --   --   --  38   < > = values in this interval not displayed.   Recent Imaging:   No results found for this or any previous visit (from the past 24 hour(s)).

## 2018-02-23 NOTE — PLAN OF CARE
Problem: Patient Care Overview  Goal: Plan of Care/Patient Progress Review  Pt up in chair and bathroom. Using stacey steady for transfers. Fair appetite for dinner. Some pain in bilateral shoulders at bedtime. Given Tylenol for comfort.

## 2018-02-23 NOTE — PROGRESS NOTES
CLINICAL NUTRITION SERVICES  -  ASSESSMENT NOTE    Recommendations Ordered by Registered Dietitian (RD):   Scheduled tomorrows breakfast and supplement shake per pt/dtr request   Malnutrition: Severe malnutrition  In Context of:  Chronic illness or disease     REASON FOR ASSESSMENT  Josy Thomson is a 89 year old female seen by Registered Dietitian for LOS    NUTRITION HISTORY  - Information obtained from patient, daughter, EMR.   - Pt has had reduced appetite/intakes for about 2-3 weeks. Daughter notes likely <50% baseline in this time period.   - Daughter does grocery shopping and noticed that pt has not been eating even her favorite foods recently.   - Usual intake ~2 meals daily, prepared by daughter. Favorites include roast beef on a Hawaiian roll w/ butter, PB and jelly sandwiches, eggs, cheese.   - No high protein oral supplements at home.   - Barriers include decreased appetite, early satiety, and dislike of certain foods, ?taste changes.     - Pt lives alone, one daughter visits daily (acts as pt's PCA). Noticed that pt had not eaten the dinner prepared for her the evening before admission. Pt denies change in appetite on admission although daughter reported that Josy had not been eating normally.   - Hx upper GIB, presents for generalized weakness, depression.       CURRENT NUTRITION ORDERS  Diet Order:     Regular + Ensure Plus Shake BID between meals.     Current Intake/Tolerance:  Variable intakes over admission recorded   No meals ordered via Room service since 2/21 lunch.   - Poor appetite charted  - Per daughter, pt does not like room service foods.   - Drinks 1.5 ensure plus shakes daily (each provides 480 kcal, 15 g pro) though notes that they do fill her up quickly and she is only able to eat small bites of meals.   - Daughter has been bringing food up from cafeteria (soups, sandwiches) though pt has been eating little.       PHYSICAL FINDINGS  Observed  Limited exam  Muscle Wasting - mild in  "temples, dorsal hand. Suspect at least mild generalized muscle loss due to sarcopenia in aging.   Subcutaneous fat loss - orbital region mild  Obtained from Chart/Interdisciplinary Team  None noted    ANTHROPOMETRICS  Height: 4' 11\"  Weight: 125 lbs 6.4 oz (56.9 kg) - trends down over admission  Body mass index is 25.33 kg/(m^2).   Weight Status:  Overweight BMI 25-29.9 --> though appropriate given age  IBW: 44.3 kg  % IBW: 128%  Weight History: Daughter notes up to an 6-8# loss in 2 weeks (6%).   Wt Readings from Last 10 Encounters:   02/23/18 56.9 kg (125 lb 6.4 oz)   11/27/17 62.7 kg (138 lb 4.8 oz)   11/17/15 64.9 kg (143 lb)   10/12/15 63.5 kg (140 lb)   10/27/12 77.1 kg (170 lb)   06/15/12 85.3 kg (188 lb)   02/20/12 81.6 kg (179 lb 14.3 oz)   02/18/12 81.6 kg (180 lb)       LABS  Labs reviewed    MEDICATIONS  Medications reviewed    Dosing Weight 56.9 lg    ASSESSED NUTRITION NEEDS PER APPROVED PRACTICE GUIDELINES:  Estimated Energy Needs: 7977-1659 kcals (25-30 Kcal/Kg)  Justification: maintenance  Estimated Protein Needs: 68-85 grams protein (1.2-1.5 g pro/Kg)  Justification: preservation of lean body mass  Estimated Fluid Needs: >1 mL/kcal   Justification: maintenance    MALNUTRITION:  % Weight Loss:  > 5% in 1 month (severe malnutrition)  % Intake:  </= 50% for >/= 5 days (severe malnutrition)  Subcutaneous Fat Loss:  Orbital region mild depletion  Muscle Loss:  Temporal region mild depletion and Dorsal hand region mild depletion  Fluid Retention:  None noted    Malnutrition Diagnosis: Severe malnutrition  In Context of:  Chronic illness or disease    NUTRITION DIAGNOSIS:  Malnutrition related to decreased appetite/intakes and dislike of certain foods as evidenced by pt eating <50% baseline x2 weeks or greater, e/o fat and muscle loss, weight loss of up to 6% in the past two weeks, coding for malnutrition.       NUTRITION INTERVENTIONS  Recommendations / Nutrition Prescription  Continue diet per MD + high " protein supplement shakes BID       Implementation  Nutrition education: Provided education on supplements, protein foods, encouraged bites every couple hours. Encouraged continue shakes at TCU and home.   Medical Food Supplement: adjusted schedule pt pt/family request  Collaboration and Referral of Nutrition care: dietetic intern attendance at rounds.       Nutrition Goals  Pt to tolerate at least 50% of adequate meals + 1-2 supplements daily to show improvement in PO intake.       MONITORING AND EVALUATION:  Progress towards goals will be monitored and evaluated per protocol and Practice Guidelines      Yudelka Brannon RD, LD  3rd floor/ICU: [phone]  All other floors: [phone]  Weekend/holiday: [phone]  Office: [phone]

## 2018-02-23 NOTE — PROGRESS NOTES
CM: PT not ready to dc today. Plan is to dc tomorrow after she has transfusion . Called [facility] to update MD about plan as well  I/P:     02/23/18 0900   [facility]   Skilled Nursing Facility [facility] [phone], Fax: [phone]   H/E transport set up for sat @ 1400

## 2018-02-23 NOTE — DISCHARGE INSTRUCTIONS
NUTRITION DISCHARGE INSTRUCTIONS  - Recommend continue high protein beverage supplements at discharge and while admitted to TCU.

## 2018-02-24 NOTE — PLAN OF CARE
Problem: Patient Care Overview  Goal: Plan of Care/Patient Progress Review    [place]: A&O  VSS: afebrile, tachy  LS: dim on RA  GI: in BR  : hypoactive  Skin: intact, coccyx red, barrier cream applied  Activity: x1-2, stacey steady   Diet: reg, poor appetite   Pain: denies  Plan: poss d/c to TCU tmrrw

## 2018-02-24 NOTE — PLAN OF CARE
Problem: Patient Care Overview  Goal: Plan of Care/Patient Progress Review  PT: Patient noted to be discharging to TCU today at 2pm; Goals not met; will defer further PT to TCU.  Physical Therapy Discharge Summary    Reason for therapy discharge:    Discharged to transitional care facility.    Progress towards therapy goal(s). See goals on Care Plan in [facility] electronic health record for goal details.  Goals not met.  Barriers to achieving goals:   limited tolerance for therapy and discharge from facility.    Therapy recommendation(s):    Continued therapy is recommended.  Rationale/Recommendations:  TCU to maximize return to highest level of function.

## 2018-02-24 NOTE — PROGRESS NOTES
SW: Writer received call from Kadi at [facility] TCU. She was questioning if pt is aware of the private room fee as she does not appear to have any contact precautions. Writer spoke briefly with RN who reports the pt is neutropenic and should have private room as she would likely die if she had contacted with an ill person. Writer spoke with Kadi at [facility] who spoke with her nursing staff, [facility] does consider this a VALID reason for a private room without the daily fee.     P: Pt to d/c via HE transport 2pm today to [facility] TCU.

## 2018-02-24 NOTE — PROGRESS NOTES
Patient hospitalized for acute encephalopathy and gastroenteritis. Cleared for discharge to [facility] TCU today per MD. Discharge instructions, medications, and follow-ups reviewed with patient and daughter, Jazmine, in detail. Discharge medications were sent to TCU. Patient and daughter verbalized understanding of discharge instructions. Belongings were returned to patient at time of discharge. [facility] providing transport to TCU.

## 2018-02-24 NOTE — PLAN OF CARE
Problem: Patient Care Overview  Goal: Plan of Care/Patient Progress Review  Ambulatory Status:  Pt up A1-2 with stacey contreras.  VS:  vss  Pain:  Tylenol for general body aches, rested well after that.  Resp: LS diminished  GI:  Denies nausea.  Regular diet.  BS hypoactive.  Passing flatus.  Last BM 2/22.  Labs:  Hemoglobin 7.1 yesterday one unit of blood transfused, recheck this morning.  plt 36  Consults:  Pt, SW, nutrition  Disposition:  DC today 2pm to [facility]

## 2018-02-25 NOTE — DISCHARGE SUMMARY
Admit Date:     02/16/2018   Discharge Date:     02/24/2018      PRIMARY CARE PHYSICIAN:  Dr. Abeba Bradford at [facility]. The patient's oncologist is Dr. Morrissey.      DISCHARGE DIAGNOSES:   1.  Increased generalized weakness and evidence for volume depletion.   2.  Volume depletion secondary to liquid stools and inadequate intake.   3.  Metabolic encephalopathy and mild confusion cleared by discharge.   4.  Myelodysplasia syndrome with chronic pancytopenia.   5.  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 Received 2 units of blood while here and being managed as an outpatient with transfusions and desferal.   6.  Hypertension but with lower blood pressures during admission and 1 antihypertensive medicine discontinued.   7.  Marked deconditioning requiring transfer to a TCU after discharge.      HOSPITAL COURSE:  Josy Thomson is a 89-year-old female with macular degeneration, poor hearing, myelodysplastic syndrome transfusion dependent, pancytopenia, hypertension, depression, prior upper GI bleed, iron overload who presented with increased confusion and marked increased generalized weakness.  She had at least 4 stools daily yet the workup was negative for any infectious etiology or C. difficile.  She initially received IV fluids, then there was some concern that she may be developing vascular congestion and the IV fluids were discontinued.  Her confusion did clear, but she remained weak and could not walk, she could stand from a sitting position where normally at home she could use her walker and walk short distances to do her dishes.  No evidence for an infection was found.  There were several days where she had low normal blood pressure and her losartan, HCTZ was discontinued and the dose of her Toprol was decreased from 100 mg to 50.  Slowly her appetite improved and she was able to take in adequate amounts.  She also felt received a unit of blood on admission and 1 before discharged for a hemoglobin of 7 with  repeat hemoglobin of 8.  At the time of discharge, she continued to be alert and oriented with appropriate responses and was aware of the plan for transitional care until she was strong enough to return home.      PHYSICAL EXAMINATION:   GENERAL:  Sitting up in the chair, hard of hearing but appropriate responses.   HEENT:  Normal for age, she is hard of hearing and has macular degeneration.  Mucous membranes moist.   VITAL SIGNS:  Blood pressure 140/52 with a heart rate of 96, respirations 18, no hypoxia and no fever.   NECK:  Jugular venous pressure is normal.  Carotid upstroke and volume are normal.   LUNGS:  Respiratory rate and effort is normal.  Lung fields are clear.     HEART:  Normal heart rate and rhythm, no significant murmur.   ABDOMEN:  Soft and nontender.  She is eating small amounts of her meals and drinking and can and half of Boost. At times she has the urge to quickly have a bowel movement.  Her liquid stools have cleared up.   GENITOURINARY:  Voiding without difficulty.   NEUROLOGIC:  No focal weakness or sensory deficits.  She is not confused and is oriented.   MUSCULOSKELETAL:  No joint inflammation.   EXTREMITIES: No edema.  Good pulses.   SKIN:  Without lesions or rashes.   PSYCHOLOGICAL:  Psychologically she understands the treatment plan.      DISCHARGE MEDICATIONS:     1.  Tylenol 650 every 4 hours as needed.    2.  Vitamin complex 1 tablet daily.     3.  Oscal 2 tablets daily.     4.  Vitamin B12 injection every 30 days.   5.  Darbepoetin every other week   6.  Deferoxamine 2000 mg IV every 2 weeks with blood transfusions.   7.  Effexor 75 mg daily   8.  Toprol- mg daily.     9.  Ocuvite 1 tablet twice a day.     10.  Prilosec 40 mg daily.     11.  The Hyzaar 50/12.5 mg was discontinued and Dilaudid 2 mg as needed for severe osteoarthritis was also discontinued as she rarely used at home and had not used it at all in the hospital.      Hemoglobin at the time of discharge 8.3,  creatinine 0.6.      DISCHARGE INSTRUCTIONS:.  The patient is going to TCU for her deconditioning for PT and OT and she will follow up as arranged with Hematology for her repeat lab work and repeat blood transfusions.      TOTAL TIME SPENT:  Greater than 30 minutes was spent on discharge preparation and instruction.         NEVILLE VALDERRAMA MD             D: 2018   T: 2018   MT: CHRISTIN      Name:     TIFFANY MULTANI   MRN:      [id number]        Account:        [id number]   :      1927           Admit Date:     2018                                  Discharge Date: 2018      Document: [id number]

## 2018-03-02 NOTE — IP AVS SNAPSHOT
"` `           [place] [address] MEDICAL SURGICAL: [phone]                                              INTERAGENCY TRANSFER FORM - NURSING   3/2/2018                    Hospital Admission Date: 3/2/2018  TIFFANY MULTANI   : 1927  Sex: Female        Attending Provider: Jerica Olivier MD     Allergies:  No Known Allergies    Infection:  None   Service:  GENERAL MEDI    Ht:  1.499 m (4' 11\")   Wt:  59.7 kg (131 lb 11.2 oz)   Admission Wt:  52 kg (114 lb 10.2 oz)    BMI:  26.6 kg/m 2   BSA:  1.58 m 2            Patient PCP Information     Provider PCP Type    Abeba Bradford MD General      Current Code Status     Date Active Code Status Order ID Comments User Context       Prior      Code Status History     Date Active Date Inactive Code Status Order ID Comments User Context    3/6/2018 12:59 PM  DNR/DNI [id number]  Jerica Olivier MD Outpatient    3/3/2018  6:36 PM 3/6/2018 12:59 PM DNR/DNI [id number]  Jerica Olivier MD Inpatient    2018  5:13 PM 2018  3:55 PM DNR/DNI [id number]  Agustina Sorensen PA-C Inpatient    2018  4:35 PM 2018  5:13 PM Full Code [id number]  Agustina Sorensen PA-C Inpatient    2012  2:37 PM 2018  4:35 PM Full Code [id number]  Perlita Macdonald MD Outpatient    6/15/2012  9:11 PM 2012  2:37 PM Full Code [id number]  Lisa Alberto MD ED    2012  2:41 AM 2012  6:38 PM Full Code [id number]  Carlo Baumann MD Inpatient      Advance Directives        Scanned docmt in ACP Activity?           Yes, scanned ACP docmt        Hospital Problems as of 3/6/2018              Priority Class Noted POA    HCAP (healthcare-associated pneumonia) Medium  3/3/2018 Yes      Non-Hospital Problems as of 3/6/2018              Priority Class Noted    Chest pain Medium  2012    Benign hypertension Medium  2012    Hematochezia Medium  6/15/2012    Anemia Medium  2012    GI bleed Medium  2012    Advanced directives, counseling/discussion Medium  " 6/16/2012    MDS (myelodysplastic syndrome) (H) Medium  11/27/2015    Pancytopenia (H) Medium  2/16/2018    Generalized weakness Medium  2/16/2018    Diarrhea Medium  2/18/2018      Immunizations     Name Date      Influenza (High Dose) 3 valent vaccine 11/13/17     TD (ADULT, 7+) 10/27/12          END      ASSESSMENT     Discharge Profile Flowsheet     EXPECTED DISCHARGE     Hospice  [facility] [phone], Fax: [phone] 03/06/18 1112    Expected Discharge Date  03/07/18 (2-3 d > single/home) 03/05/18 0848   Skilled Nursing Facility  [facility] [place] [phone], Fax: [phone] 03/06/18 1034    DISCHARGE NEEDS ASSESSMENT     PAS Number  [id number] 02/23/18 0958    Patient/family verbalizes understanding of discharge plan recommendations?  Yes 03/03/18 1650   SKIN      Medical Team notified of plan?  yes 03/03/18 1650   Inspection of bony prominences  Full 03/06/18 1036    Readmission Within The Last 30 Days  previous discharge plan unsuccessful 03/03/18 1650   Inspection under devices  Full 03/06/18 1036    Equipment Currently Used at Home  grab bar;shower chair;walker, rolling;walker, standard;wheelchair, manual 03/05/18 1128   Skin WDL  WDL 03/06/18 1036    Transportation Available  car;family or friend will provide 03/03/18 1650   Skin Color/Characteristics  without discoloration 03/06/18 1036    # of Referrals Placed by CTS  External Care Coordination 02/17/18 1024   Skin Temperature  warm 03/06/18 1036    GASTROINTESTINAL (ADULT,PEDIATRIC,OB)     Skin Moisture  dry 03/06/18 1036    GI WDL  WDL 03/05/18 1849   Skin Elasticity  slow return to original state 03/06/18 1036    Last Bowel Movement  -- (GRIS) 03/04/18 2204   Skin Integrity  bruise(s) 03/06/18 1036    Passing flatus  -- (GRIS) 03/04/18 2204   SAFETY      COMMUNICATION ASSESSMENT     Safety WDL  WDL 03/06/18 1036    Patient's communication style  spoken language (English or Bilingual) 03/03/18 0133   Safety Factors  ID band  "on;upper side rails raised x 2;call light in reach 03/06/18 1036    FINAL RESOURCES     All Alarms  alarm(s) activated and audible 03/06/18 1036    Resources List  Hospice;Skilled Nursing Facility 03/06/18 1034                      Assessment WDL (Within Defined Limits) Definitions           Safety WDL     Effective: 09/28/15    Row Information: <b>WDL Definition:</b> Bed in low position, wheels locked; call light in reach; upper side rails up x 2; ID band on<br> <font color=\"gray\"><i>Item=AS safety wdl>>List=AS safety wdl>>Version=F14</i></font>      Skin WDL     Effective: 09/28/15    Row Information: <b>WDL Definition:</b> Warm; dry; intact; elastic; without discoloration; pressure points without redness<br> <font color=\"gray\"><i>Item=AS skin wdl>>List=AS skin wdl>>Version=F14</i></font>      Vitals     Vital Signs Flowsheet     VITAL SIGNS     Pain Location  Arm 03/03/18 0201    Temp  96.3  F (35.7  C) 03/06/18 1606   Pain Orientation  Right;Left 03/03/18 0201    Temp src  Axillary 03/06/18 1606   Pain Descriptors  Aching 03/03/18 0201    Resp  16 03/06/18 1606   Pain Intervention(s)  Medication (See eMAR);Repositioned 03/04/18 2158    Pulse  111 03/05/18 1713   Response to Interventions  Decrease in pain 03/06/18 1129    Heart Rate  103 03/06/18 1606   ANALGESIA SIDE EFFECTS MONITORING      Pulse/Heart Rate Source  Monitor 03/06/18 1606   Side Effects Monitoring: Respiratory Quality  R 03/06/18 1130    BP  177/77 03/06/18 1606   Side Effects Monitoring: Respiratory Depth  N 03/06/18 1130    BP Location  Right arm 03/06/18 1606   Side Effects Monitoring: Sedation Level  S 03/06/18 1130    OXYGEN THERAPY     HEIGHT AND WEIGHT      SpO2  97 % 03/06/18 1606   Weight  59.7 kg (131 lb 11.2 oz) 03/03/18 0202    O2 Device  Nasal cannula 03/06/18 1606   AUDREY COMA SCALE      Oxygen Delivery  3 LPM 03/06/18 1606   Best Eye Response  3-->(E3) to speech 03/03/18 0009    PAIN/COMFORT     Best Motor Response  5-->(M5) " localizes pain 03/03/18 0009    Patient Currently in Pain  denies 03/05/18 0300   Best Verbal Response  2-->(V2) incomprehensible speech 03/03/18 0009    Preferred Pain Scale  word (verbal rating pain scale) 03/03/18 0201   [place] Coma Scale Score  10 03/03/18 0009    Word Pain Scale  4 03/03/18 1357   POSITIONING      FACES Pain Rating  0-->no hurt 03/06/18 1129   Body Position  supine 03/06/18 1432    rFLACC Pain Rating: Face  0-->no particular expression or smile 03/06/18 1129   Head of Bed (HOB)  HOB at 20 degrees 03/06/18 1432    rFLACC Pain Rating - Legs  0-->normal position or relaxed 03/06/18 1129   Positioning/Transfer Devices  pillows 03/06/18 1432    rFLACC Pain Rating: Activity  0-->lying quietly, normal position, moves easily 03/06/18 1129   DAILY CARE      rFLACC Pain Rating - Cry  0-->no cry (awake or asleep) 03/06/18 1129   Activity Management  activity adjusted per tolerance 03/05/18 1849    rFLACC Pain Rating - Consolability  0-->content, relaxed 03/06/18 1129   Activity Assistance Provided  assistance, 2 people 03/06/18 0153    rFLACC Score: Activity  0 03/06/18 1129                 Patient Lines/Drains/Airways Status    Active LINES/DRAINS/AIRWAYS     Name: Placement date: Placement time: Site: Days: Last dressing change:    Urethral Catheter Temperature probe;Coude 16 fr 03/02/18   2353   Temperature probe;Coude   3             Patient Lines/Drains/Airways Status    Active PICC/CVC     None            Intake/Output Detail Report     Date Intake     Output Net    Shift I.V. IV Piggyback Blood Components Total Urine Total       Day 03/05/18 0700 - 03/05/18 1459 -- -- -- -- 150 150 -150    Cayla 03/05/18 1500 - 03/05/18 2259 -- -- -- -- 500 500 -500    Noc 03/05/18 2300 - 03/06/18 0659 -- -- -- -- 300 300 -300    Day 03/06/18 0700 - 03/06/18 1459 -- -- -- -- 250 250 -250    Cayla 03/06/18 1500 - 03/06/18 9596 -- -- -- -- -- -- 0      Case Management/Discharge Planning     Case Management/Discharge  Planning Flowsheet     REFERRAL INFORMATION     EMPLOYMENT      Did the Initial Social Work Assessment result in a Social Work Case?  Yes 03/03/18 1650   Do you work full or part-time?  no 03/03/18 1650    Admission Type  inpatient 03/03/18 1650   COPING/STRESS      Arrived From  nursing facility ([facility]) 03/03/18 1650   Major Change/Loss/Stressor  hospitalization 03/03/18 0137    Referral Source  high risk screening 03/03/18 1650   EXPECTED DISCHARGE      New Steerage to  Clinics?  No 03/03/18 1650   Expected Discharge Date  03/07/18 (2-3 d > single/home) 03/05/18 0848    # of Referrals Placed by CTS  External Care Coordination 02/17/18 1024   DISCHARGE PLANNING      Reason For Consult  care coordination/care conference;community resources;discharge planning 03/03/18 1650   Patient/family verbalizes understanding of discharge plan recommendations?  Yes 03/03/18 1650    Record Reviewed  clinical discipline documentation;history and physical;medical record;patient profile;plan of care 03/03/18 1650   Medical Team notified of plan?  yes 03/03/18 1650    CTS Assigned to Case  corinne  03/05/18 1128   Readmission Within The Last 30 Days  previous discharge plan unsuccessful 03/03/18 1650    Primary Care Clinic Name  [facility] 03/03/18 1650   Transportation Available  car;family or friend will provide 03/03/18 1650    Primary Care MD Name  Dr. Abeba Bradford 03/03/18 1650   FINAL RESOURCES      LIVING ENVIRONMENT     Equipment Currently Used at Home  grab bar;shower chair;walker, rolling;walker, standard;wheelchair, manual 03/05/18 1128    Lives With  alone 03/03/18 1650   Resources List  Hospice;Skilled Nursing Facility 03/06/18 1034    Living Arrangements  house 03/03/18 1650   Hospice  [facility] [phone], Fax: [phone] 03/06/18 1112    Primary Care Provided By  other (see comments) (Facility staff while at TCU, daughter is PCA when home) 03/03/18 1650   Skilled Nursing  Facility  [facility] [phone], Fax: [phone] 03/06/18 1034    Able to Return to Prior Living Arrangements  yes 03/03/18 1650   PAS Number  [id number] 02/23/18 0958    HOME SAFETY     ABUSE RISK SCREEN      Patient Feels Safe Living in Home?  yes 03/03/18 1650   QUESTION TO PATIENT:  Has a member of your family or a partner(now or in the past) intimidated, hurt, manipulated, or controlled you in any way?  no 03/03/18 0137    ASSESSMENT OF FAMILY/SOCIAL SUPPORT     QUESTION TO PATIENT: Do you feel safe going back to the place where you are living?  yes 03/03/18 0137    Marital Status   03/03/18 1650   OTHER      Who is your support system?  Children;Facility resident(s)/Staff 03/03/18 1650   Are you depressed or being treated for depression?  No 03/03/18 1038    Description of Support System  Supportive;Involved 03/03/18 1650   HOMICIDE RISK      Support Assessment  Lacks necessary supervision and assistance;Lacks adequate physical care;Caregiver experiencing high stress;Caregiver difficulty providing physical care/safety 03/03/18 1650   Feels Like Hurting Others  no 03/02/18 6033

## 2018-03-02 NOTE — IP AVS SNAPSHOT
"[place] [address] MEDICAL SURGICAL: [phone]                                              INTERAGENCY TRANSFER FORM - PHYSICIAN ORDERS   3/2/2018                    Hospital Admission Date: 3/2/2018  TIFFANY MULTANI   : 1927  Sex: Female        Attending Provider: Jerica Olivier MD     Allergies:  No Known Allergies    Infection:  None   Service:  GENERAL MEDI    Ht:  1.499 m (4' 11\")   Wt:  59.7 kg (131 lb 11.2 oz)   Admission Wt:  52 kg (114 lb 10.2 oz)    BMI:  26.6 kg/m 2   BSA:  1.58 m 2            Patient PCP Information     Provider PCP Type    Abeba Bradford MD General      ED Clinical Impression     Diagnosis Description Comment Added By Time Added    Pancytopenia (H) [D61.818] Pancytopenia (H) [D61.818]  Wilber Qureshi MD 3/2/2018 11:30 PM    MDS (myelodysplastic syndrome) (H) [D46.9] MDS (myelodysplastic syndrome) (H) [D46.9]  Wilber Qureshi MD 3/2/2018 11:30 PM    Pneumonia of both lower lobes due to infectious organism [J18.9] Pneumonia of both lower lobes due to infectious organism [J18.9]  Wilber Qureshi MD 3/2/2018 11:30 PM    Acute delirium [R41.0] Acute delirium [R41.0]  Wilber Qureshi MD 3/2/2018 11:30 PM    Acute renal failure, unspecified acute renal failure type (H) [N17.9] Acute renal failure, unspecified acute renal failure type (H) [N17.9]  Wilber Qureshi MD 3/2/2018 11:30 PM    Severe sepsis (H) [A41.9, R65.20] Severe sepsis (H) [A41.9, R65.20]  Wilber Qureshi MD 3/2/2018 11:45 PM      Hospital Problems as of 3/6/2018              Priority Class Noted POA    HCAP (healthcare-associated pneumonia) Medium  3/3/2018 Yes      Non-Hospital Problems as of 3/6/2018              Priority Class Noted    Chest pain Medium  2012    Benign hypertension Medium  2012    Hematochezia Medium  6/15/2012    Anemia Medium  2012    GI bleed Medium  2012    Advanced directives, counseling/discussion Medium  2012    " MDS (myelodysplastic syndrome) (H) Medium  11/27/2015    Pancytopenia (H) Medium  2/16/2018    Generalized weakness Medium  2/16/2018    Diarrhea Medium  2/18/2018      Code Status History     Date Active Date Inactive Code Status Order ID Comments User Context    3/6/2018 12:59 PM  DNR/DNI [id number]  Jerica Olivier MD Outpatient    3/3/2018  6:36 PM 3/6/2018 12:59 PM DNR/DNI [id number]  Jerica Olivier MD Inpatient    2/16/2018  5:13 PM 2/24/2018  3:55 PM DNR/DNI [id number]  Agustina Sorensen PA-C Inpatient    2/16/2018  4:35 PM 2/16/2018  5:13 PM Full Code [id number]  Agustina Sorensen PA-C Inpatient    6/16/2012  2:37 PM 2/16/2018  4:35 PM Full Code [id number]  Perlita Macdonald MD Outpatient    6/15/2012  9:11 PM 6/16/2012  2:37 PM Full Code [id number]  Lisa Alberto MD ED    2/19/2012  2:41 AM 2/20/2012  6:38 PM Full Code [id number]  Carlo Baumann MD Inpatient         Medication Review      START taking        Dose / Directions Comments    acetaminophen 650 MG Suppository   Commonly known as:  TYLENOL   Used for:  End of life care   Replaces:  TYLENOL PO        Dose:  650 mg   Place 1 suppository (650 mg) rectally every 4 hours as needed for fever or mild pain (Do not exceed 4000 mg total acetaminophen per day.) Unwrap prior to insertion.   Quantity:  12 suppository   Refills:  0        atropine 1 % ophthalmic solution   Used for:  End of life care        Dose:  2-4 drop   Take 2-4 drops by mouth, place under tongue or place inside cheek every 2 hours as needed for other (terminal respiratory secretions) Not for ophthalmic use.   Quantity:  5 mL   Refills:  0        bisacodyl 10 MG Suppository   Commonly known as:  DULCOLAX   Used for:  End of life care        Unwrap and insert 1 suppository rectally twice daily as needed for constipation.   Quantity:  4 suppository   Refills:  0        diclofenac 1 % Gel topical gel   Commonly known as:  VOLTAREN   Used for:  End of life care        Dose:  2 g   Apply 2 g  topically 4 times daily To upper arms and shoulders   Quantity:  1 Tube   Refills:  0    Please relabel tube from 3rd floor and send with pt.       haloperidol 2 MG/ML (HIGH CONC) solution   Commonly known as:  HALDOL   Used for:  End of life care        Dose:  0.5-1 mg   Take 0.25-0.5 mLs (0.5-1 mg) by mouth, place under tongue or insert rectally every 6 hours as needed for agitation (nausea)   Quantity:  30 mL   Refills:  0        HYDROmorphone (HIGH CONC) 10 mg/mL Liqd oral   Commonly known as:  DILAUDID   Used for:  End of life care   Replaces:  DILAUDID PO        Dose:  2 mg   Place 0.2 mLs (2 mg) under the tongue every 4 hours And 0.2-0.4 ML (2-4 mg) q 2 hours prn breakthrough pain or dyspnea with respiratory rate greater than 20.   Quantity:  30 mL   Refills:  0        LORazepam 2 MG/ML (HIGH CONC) solution   Commonly known as:  ATIVAN   Used for:  End of life care        Dose:  0.5 mg   Take 0.25 mLs (0.5 mg) by mouth, place under tongue or insert rectally every 3 hours as needed for anxiety (restlessness)   Quantity:  30 mL   Refills:  0        MEDICATION INSTRUCTION   Used for:  End of life care        If care facility cannot accept or use ranges, facility is instructed to use lower end of dosing range   Refills:  0          CONTINUE these medications which may have CHANGED, or have new prescriptions. If we are uncertain of the size of tablets/capsules you have at home, strength may be listed as something that might have changed.        Dose / Directions Comments    albuterol (2.5 MG/3ML) 0.083% neb solution   This may have changed:  Another medication with the same name was removed. Continue taking this medication, and follow the directions you see here.        Dose:  1 vial   Take 1 vial by nebulization every 6 hours as needed for shortness of breath / dyspnea or wheezing   Refills:  0          CONTINUE these medications which have NOT CHANGED        Dose / Directions Comments    SYSTANE BALANCE 0.6 %  Soln ophthalmic solution   Generic drug:  propylene glycol        Dose:  1 drop   Place 1 drop into both eyes 3 times daily   Refills:  0          STOP taking     B COMPLEX 1 PO           Calcium carb-Vitamin D 500 mg [place]-200 units 500-200 MG-UNIT per tablet   Commonly known as:  OSCAL with D;Oyster Shell Calcium           cyanocobalamin 1000 MCG/ML injection   Commonly known as:  VITAMIN B12           DARBEPOETIN KEVIN-POLYSORBATE IJ           deferoxamine           DILAUDID PO   Replaced by:  HYDROmorphone (HIGH CONC) 10 mg/mL Liqd oral           EFFEXOR XR 75 MG 24 hr capsule   Generic drug:  venlafaxine           LEVAQUIN PO           metoprolol succinate 100 MG 24 hr tablet   Commonly known as:  TOPROL-XL           OCUVITE PO           omeprazole 20 MG CR capsule   Commonly known as:  priLOSEC           saccharomyces boulardii 250 MG capsule   Commonly known as:  FLORASTOR           TAMIFLU PO           trimethoprim-polymyxin b ophthalmic solution   Commonly known as:  POLYTRIM           TYLENOL PO   Replaced by:  acetaminophen 650 MG Suppository                   After Care     Activity - Up ad charisma           Advance Diet as Tolerated       Follow this diet upon discharge: Orders Placed This Encounter      Regular Diet Adult       Farmer catheter       To straight gravity drainage. Change catheter every 2 weeks and PRN for leaking or decreased uring output with signs of bladder distention. DO NOT change catheter without a specific MD order IF diagnosis of benign prostatic hypertrophy (BPH), neurogenic bladder, or other urological conditions       Farmer catheter       To straight gravity drainage. Change catheter every 2 weeks and PRN for leaking or decreased uring output with signs of bladder distention. DO NOT change catheter without a specific MD order IF diagnosis of benign prostatic hypertrophy (BPH), neurogenic bladder, or other urological conditions       General info for SNF       Length of Stay Estimate:  Short Term Care: Estimated # of Days <30  Condition at Discharge: Improving  Level of care:skilled   Rehabilitation Potential: Poor  Admission H&P remains valid and up-to-date: Yes  Recent Chemotherapy: N/A  Use Nursing Home Standing Orders: Yes       Mantoux instructions       Give two-step Mantoux (PPD) Per Facility Policy Yes             Procedures     Oxygen - Nasal cannula       3 Lpm by nasal cannula to keep O2 sats 92% or greater.             Referrals     HOSPICE REFERRAL       **Order classes of: [facility], [facility] and [facility] will route to the Home Care and Hospice Referral Pool.  Home Care or Hospice will then contact the patient to schedule their appointment.**    Hospice eligibility overview: prognosis of 6 months or less, end stage disease, patient goals are comfort only, patient is not seeking curative treatment.    If you do not hear from Hospice, or you would like to call to schedule, please call the referring place of service that your provider has listed below.  ______________________________________________________________________    Your provider has referred you to: [facility]: [facility] [phone]   [email or web address]    Anticipated discharge date 3/6/18. Homecare to begin within 48 hours of discharge.  PLEASE Schedule Hospice consult for 24 - 48 hours.  Please call if there is need for a variance to this timeline.    REASON FOR REFERRAL: Hospice Diagnosis: End stage Myelodysplastic syndrome    ADDITIONAL SERVICES NEEDED:     OTHER PERTINENT INFORMATION: Patient was last seen by provider on 3/6/18 for daily hospital visitation.  Factors that indicate prognosis of less than 6 months:  Weight loss: severe malnutrition 2ndary to decrease po intake  Functional decline: yes  End stage disease: MDS    Current Outpatient Prescriptions:  MEDICATION INSTRUCTION, If care facility cannot accept or use ranges, facility is instructed  to use lower end of dosing range, Disp: , Rfl:   HYDROmorphone, HIGH CONC, (DILAUDID) 10 mg/mL LIQD oral, Place 0.2 mLs (2 mg) under the tongue every 4 hours And 0.2-0.4 ML (2-4 mg) q 2 hours prn breakthrough pain or dyspnea with respiratory rate greater than 20., Disp: 30 mL, Rfl: 0  atropine 1 % ophthalmic solution, Take 2-4 drops by mouth, place under tongue or place inside cheek every 2 hours as needed for other (terminal respiratory secretions) Not for ophthalmic use., Disp: 5 mL, Rfl: 0  LORazepam (ATIVAN) 2 MG/ML (HIGH CONC) solution, Take 0.25 mLs (0.5 mg) by mouth, place under tongue or insert rectally every 3 hours as needed for anxiety (restlessness), Disp: 30 mL, Rfl: 0  haloperidol (HALDOL) 2 MG/ML (HIGH CONC) solution, Take 0.25-0.5 mLs (0.5-1 mg) by mouth, place under tongue or insert rectally every 6 hours as needed for agitation (nausea), Disp: 30 mL, Rfl: 0  bisacodyl (DULCOLAX) 10 MG Suppository, Unwrap and insert 1 suppository rectally twice daily as needed for constipation., Disp: 4 suppository, Rfl: 0  acetaminophen (TYLENOL) 650 MG Suppository, Place 1 suppository (650 mg) rectally every 4 hours as needed for fever or mild pain (Do not exceed 4000 mg total acetaminophen per day.) Unwrap prior to insertion., Disp: 12 suppository, Rfl: 0  diclofenac (VOLTAREN) 1 % GEL topical gel, Apply 2 g topically 4 times daily To upper arms and shoulders, Disp: 1 Tube, Rfl: 0      Patient Active Problem List:     Chest pain     Benign hypertension     Hematochezia     Anemia     GI bleed     Advanced directives, counseling/discussion     MDS (myelodysplastic syndrome) (H)     Pancytopenia (H)     Generalized weakness     Diarrhea     HCAP (healthcare-associated pneumonia)    This patient is under my care, and I have initiated the establishment of the plan of care. This patient will be followed by a physician who will periodically review the plan of care.    Physician/Provider to provide follow up care:  Abeba Bradford    Responsible PECOS certified Physician at time of discharge: Jerica Olivier MD    Please be aware that coverage of these services is subject to the terms and limitations of your health insurance plan.  Call member services at your health plan with any benefit or coverage questions.             Follow-Up Appointment Instructions     Future Labs/Procedures    Follow Up and recommended labs and tests     Comments:    Follow-up with [facility] hospice team at discharge.      Follow-Up Appointment Instructions     Follow Up and recommended labs and tests       Follow-up with [facility] team at discharge.             Statement of Approval     Ordered          03/06/18 1300  I have reviewed and agree with all the recommendations and orders detailed in this document.  EFFECTIVE NOW     Approved and electronically signed by:  Jerica Olivier MD

## 2018-03-02 NOTE — LETTER
Transition Communication Hand-off for Care Transitions to Next Level of Care Provider    Name: Josy Thomson  MRN #: [id number]  Primary Care Provider: Abeba Bradford  Primary Care MD Name: Dr. Abeba Bradford  Primary Clinic: [facility] [address] [place] [address]  Primary Care Clinic Name: [facility] [place]  Reason for Hospitalization:  Acute delirium [R41.0]  MDS (myelodysplastic syndrome) (H) [D46.9]  Pancytopenia (H) [D61.818]  Severe sepsis (H) [A41.9, R65.20]  Acute renal failure, unspecified acute renal failure type (H) [N17.9]  Pneumonia of both lower lobes due to infectious organism [J18.9]  Admit Date/Time: 3/2/2018  8:44 PM  Discharge Date: ***  Payor Source: Payor: MEDICA / Plan: [employer] / Product Type: Indemnity /         Key Recommendations:  Home safety. Daughter, Jazmine, is patient's PCA. She provides daily AM cares. Jazmine will prep meals for the day as patient cannot cook for herself; assist with hygiene cares. Patient has macular degeneration w/very poor eye sight and very [place]. Mentation at baseline is very sharp. She has been experiencing confusion the last 2 admissions. Jamzine does all grocery shopping, pays bills, provides transportation for patient. Also of concern, patient has had multiple falls at home. She has a Life Alert but forgets to push it.  Prior to last admission (2/16/18) there was concern for caregiver fatigue. The patient was discharged to [facility] after last admission. Unclear where [facility] is in the process to obtain additional help.       Alexandra Potter    AVS/Discharge Summary is the source of truth; this is a helpful guide for improved communication of patient story

## 2018-03-02 NOTE — IP AVS SNAPSHOT
"          [place] [address] MEDICAL SURGICAL: [phone]                                              INTERAGENCY TRANSFER FORM - LAB / IMAGING / EKG / EMG RESULTS   3/2/2018                    Hospital Admission Date: 3/2/2018  TIFFANY MULTANI   : 1927  Sex: Female        Attending Provider: Jerica Olivier MD     Allergies:  No Known Allergies    Infection:  None   Service:  GENERAL MEDI    Ht:  1.499 m (4' 11\")   Wt:  59.7 kg (131 lb 11.2 oz)   Admission Wt:  52 kg (114 lb 10.2 oz)    BMI:  26.6 kg/m 2   BSA:  1.58 m 2            Patient PCP Information     Provider PCP Type    Abeba Bradford MD General         Lab Results - 3 Days      Blood culture [[id number]]  Resulted: 18 012, Result status: Preliminary result    Ordering provider: Wilber Qureshi MD  18 Resulting lab: [facility]    Specimen Information    Type Source Collected On   Blood Arm, Left 18   Comment:  Left Arm          Components       Value Reference Range Flag Lab   Specimen Description Blood Left Arm      Special Requests Aerobic and anaerobic bottles received   75   Culture Micro No growth after 3 days   75            Blood culture [[id number]]  Resulted: 18 0122, Result status: Preliminary result    Ordering provider: Wilber Qureshi MD  18 Resulting lab: [facility]    Specimen Information    Type Source Collected On   Blood Arm, Right 18   Comment:  Right Arm          Components       Value Reference Range Flag Lab   Specimen Description Blood Right Arm      Special Requests Aerobic and anaerobic bottles received   75   Culture Micro No growth after 3 days   75            CBC with platelets differential [[id number]] (Abnormal)  Resulted: 18 0803, Result status: Final result    Ordering provider: Gopi Gagnon MD  18 0000 Resulting lab: [facility]    Specimen Information  "   Type Source Collected On   Blood  03/05/18 0623          Components       Value Reference Range Flag Lab   WBC 0.8 4.0 - 11.0 10e9/L LL FrRd   Comment:  . CWP   RBC Count 2.53 3.8 - 5.2 10e12/L L FrRdHs   Hemoglobin 7.2 11.7 - 15.7 g/dL L FrRdHs   Hematocrit 22.2 35.0 - 47.0 % L FrRdHs   MCV 88 78 - 100 fl  FrRdHs   MCH 28.5 26.5 - 33.0 pg  FrRdHs   MCHC 32.4 31.5 - 36.5 g/dL  FrRdHs   RDW 15.4 10.0 - 15.0 % H FrRdHs   Platelet Count 16 150 - 450 10e9/L LL FrRd   Comment:  . CWP   Diff Method Manual Differential   FrRdHs   % Neutrophils 78.0 %  FrRdHs   % Lymphocytes 18.0 %  FrRdHs   % Monocytes 1.0 %  FrRdHs   % Eosinophils 3.0 %  FrRdHs   % Basophils 0.0 %  FrRdHs   Absolute Neutrophil 0.6 1.6 - 8.3 10e9/L L FrRdHs   Absolute Lymphocytes 0.1 0.8 - 5.3 10e9/L L FrRdHs   Absolute Monocytes 0.0 0.0 - 1.3 10e9/L  FrRdHs   Absolute Eosinophils 0.0 0.0 - 0.7 10e9/L  FrRdHs   Absolute Basophils 0.0 0.0 - 0.2 10e9/L  FrRdHs   Acanthocytes Moderate   FrRdHs   Dohle Bodies Present   FrRdHs   Toxic Granulation Present   FrRdHs   Platelet Estimate Automated count confirmed.  Giant platelets are present.   Rd            Basic metabolic panel [[id number]] (Abnormal)  Resulted: 03/05/18 0714, Result status: Final result    Ordering provider: Jerica Olivier MD  03/05/18 0000 Resulting lab: [facility]    Specimen Information    Type Source Collected On   Blood  03/05/18 0623          Components       Value Reference Range Flag Lab   Sodium 148 133 - 144 mmol/L H FrRdHs   Potassium 3.6 3.4 - 5.3 mmol/L  FrRdHs   Chloride 118 94 - 109 mmol/L H FrRdHs   Carbon Dioxide 23 20 - 32 mmol/L  FrRdHs   Anion Gap 7 3 - 14 mmol/L  FrRdHs   Glucose 120 70 - 99 mg/dL H FrRdHs   Urea Nitrogen 88 7 - 30 mg/dL H [facility]   Creatinine 1.86 0.52 - 1.04 mg/dL H [facility]   GFR Estimate 25 >60 mL/min/1.7m2 L [facility]   Comment:  Non  GFR Calc   GFR Estimate If Black 31 >60 mL/min/1.7m2 L [facility]   Comment:    GFR Calc   Calcium 8.6 8.5 - 10.1 mg/dL  FrRdHs            Lactic acid level STAT [[id number]]  Resulted: 03/05/18 0318, Result status: Final result    Ordering provider: Jerica Olivier MD  03/05/18 0257 Resulting lab: [facility]    Specimen Information    Type Source Collected On   Blood  03/05/18 0312          Components       Value Reference Range Flag Lab   Lactic Acid 1.3 0.7 - 2.0 mmol/L  FrRdHs            Vancomycin level [[id number]]  Resulted: 03/04/18 1242, Result status: Final result    Ordering provider: Vladimir Garcia MD  03/04/18 0500 Resulting lab: [facility]    Specimen Information    Type Source Collected On   Blood  03/04/18 1123          Components       Value Reference Range Flag Lab   Vancomycin Level 9.6 mg/L  FrRd   Comment:         Traditional Dosing therapeutic Range:         Trough 8-20 mg/L         Peak 20-50 mg/L              CBC with platelets differential [[id number]] (Abnormal)  Resulted: 03/04/18 0831, Result status: Final result    Ordering provider: Gopi Gagnon MD  03/04/18 0000 Resulting lab: [facility]    Specimen Information    Type Source Collected On   Blood  03/04/18 0652          Components       Value Reference Range Flag Lab   WBC 0.6 4.0 - 11.0 10e9/L LL FrRdHs   Comment:  .   Consistent with previous critical result     RBC Count 2.67 3.8 - 5.2 10e12/L L FrRdHs   Hemoglobin 7.7 11.7 - 15.7 g/dL L FrRdHs   Hematocrit 22.9 35.0 - 47.0 % L FrRdHs   MCV 86 78 - 100 fl  FrRdHs   MCH 28.8 26.5 - 33.0 pg  FrRdHs   MCHC 33.6 31.5 - 36.5 g/dL  FrRdHs   RDW 15.1 10.0 - 15.0 % H FrRdHs   Platelet Count 18 150 - 450 10e9/L LL FrRdHs   Comment:         This result has been called to NATALIIA THOMAS by Magalie Baeza on 03 04 2018   at 0824, and has been read back.      Diff Method Manual Differential   FrRdHs   % Neutrophils 56.0 %  FrRdHs   % Lymphocytes 40.0 %  FrRdHs   % Monocytes 2.0 %  FrRdHs   % Eosinophils 0.0 %  FrRdHs   %  Basophils 0.0 %  FrRdHs   % Metamyelocytes 2.0 %  FrRdHs   Absolute Neutrophil 0.3 1.6 - 8.3 10e9/L LL FrRdHs   Comment:         This result has been called to NATALIIA THOMAS by Magalie Baeza on 03 04 2018   at 0824, and has been read back.      Absolute Lymphocytes 0.2 0.8 - 5.3 10e9/L L FrRdHs   Absolute Monocytes 0.0 0.0 - 1.3 10e9/L  FrRdHs   Absolute Eosinophils 0.0 0.0 - 0.7 10e9/L  FrRdHs   Absolute Basophils 0.0 0.0 - 0.2 10e9/L  FrRdHs   Absolute Metamyelocytes 0.0 0 10e9/L  FrRdHs   Anisocytosis Slight   FrRdHs   Microcytes Present   FrRdHs   Dohle Bodies Present   FrRdHs   Toxic Granulation Present   FrRdHs   Platelet Estimate Automated count confirmed.  Giant platelets are present.   FrRdHs            Basic metabolic panel [[id number]] (Abnormal)  Resulted: 03/04/18 0724, Result status: Final result    Ordering provider: Jerica Olivier MD  03/04/18 0000 Resulting lab: [facility]    Specimen Information    Type Source Collected On   Blood  03/04/18 0652          Components       Value Reference Range Flag Lab   Sodium 145 133 - 144 mmol/L H FrRdHs   Potassium 3.6 3.4 - 5.3 mmol/L  FrRdHs   Chloride 113 94 - 109 mmol/L H FrRdHs   Carbon Dioxide 23 20 - 32 mmol/L  FrRdHs   Anion Gap 9 3 - 14 mmol/L  FrRdHs   Glucose 133 70 - 99 mg/dL H FrRdHs   Urea Nitrogen 86 7 - 30 mg/dL H FrRdHs   Creatinine 2.03 0.52 - 1.04 mg/dL H FrRdHs   GFR Estimate 23 >60 mL/min/1.7m2 L FrRdHs   Comment:  Non  GFR Calc   GFR Estimate If Black 28 >60 mL/min/1.7m2 L FrRdHs   Comment:  African American GFR Calc   Calcium 8.2 8.5 - 10.1 mg/dL L FrRdHs            Glucose by meter [[id number]] (Abnormal)  Resulted: 03/04/18 0553, Result status: Final result    Ordering provider: Wilber Qureshi MD  03/04/18 0215 Resulting lab: POINT OF CARE TEST, GLUCOSE    Specimen Information    Type Source Collected On     03/04/18 0215          Components       Value Reference Range Flag Lab   Glucose 140 70 - 99  mg/dL H 170            Urine Culture Aerobic Bacterial [[id number]]  Resulted: 03/04/18 0544, Result status: Final result    Ordering provider: Wilber Qureshi MD  03/02/18 2117 Resulting lab: [facility]    Specimen Information    Type Source Collected On   Catheterized Urine  03/02/18 2350          Components       Value Reference Range Flag Lab   Specimen Description Catheterized Urine      Special Requests Specimen received in preservative   75   Culture Micro --   75   Result:         <1000 colonies/mL  urogenital [place]              Lactic acid level STAT [[id number]]  Resulted: 03/04/18 0328, Result status: Final result    Ordering provider: Jerica Olivier MD  03/04/18 0222 Resulting lab: [facility]    Specimen Information    Type Source Collected On   Blood  03/04/18 0251          Components       Value Reference Range Flag Lab   Lactic Acid 1.8 0.7 - 2.0 mmol/L  FrRdHs            CBC with platelets differential [[id number]] (Abnormal)  Resulted: 03/03/18 1102, Result status: Final result    Ordering provider: Jerica Olivier MD  03/03/18 0950 Resulting lab: [facility]    Specimen Information    Type Source Collected On   Blood  03/03/18 1001          Components       Value Reference Range Flag Lab   WBC 0.5 4.0 - 11.0 10e9/L LL FrRdHs   Comment:         This result has been called to ANNY BRANDON ON MS3 by Kerrie Onofre on 03 03 2018 at 1100, and has been read back.      RBC Count 2.33 3.8 - 5.2 10e12/L L FrRdHs   Hemoglobin 6.6 11.7 - 15.7 g/dL LL FrRdHs   Comment:         This result has been called to ANNY BRANDON ON MS3 by Kerrie Onofre on 03 03 2018 at 1100, and has been read back.      Hematocrit 20.5 35.0 - 47.0 % L FrRdHs   MCV 88 78 - 100 fl  FrRdHs   MCH 28.3 26.5 - 33.0 pg  FrRdHs   MCHC 32.2 31.5 - 36.5 g/dL  FrRdHs   RDW 15.1 10.0 - 15.0 % H FrRdHs   Platelet Count 31 150 - 450 10e9/L LL FrRdHs   Comment:         This result has  been called to ANNY BRANDON ON MS3 by Kerrie Onofre on 03 03 2018 at 1100, and has been read back.      Diff Method Manual Differential   FrRdHs   % Neutrophils 55.0 %  FrRdHs   Comment:  Increased Bands   % Lymphocytes 27.0 %  FrRdHs   % Monocytes 2.0 %  FrRdHs   % Eosinophils 1.0 %  FrRdHs   % Basophils 0.0 %  FrRdHs   % Metamyelocytes 14.0 %  FrRdHs   % Myelocytes 1.0 %  FrRdHs   Absolute Neutrophil 0.3 1.6 - 8.3 10e9/L LL FrRdHs   Comment:         This result has been called to ANNY BRANDON ON MS3 by Kerrie Onofre on 03 03 2018 at 1100, and has been read back.      Absolute Lymphocytes 0.1 0.8 - 5.3 10e9/L L FrRdHs   Absolute Monocytes 0.0 0.0 - 1.3 10e9/L  FrRdHs   Absolute Eosinophils 0.0 0.0 - 0.7 10e9/L  FrRdHs   Absolute Basophils 0.0 0.0 - 0.2 10e9/L  FrRdHs   Absolute Metamyelocytes 0.1 0 10e9/L H FrRdHs   Absolute Myelocytes 0.0 0 10e9/L  FrRdHs   Anisocytosis Slight   FrRdHs   Microcytes Present   FrRdHs   Macrocytes Present   FrRdHs   Dohle Bodies Present   FrRdHs   Toxic Granulation Present   FrRdHs   Platelet Estimate Automated count confirmed.  Giant platelets are present.   FrRdHs            Glucose by meter [[id number]] (Abnormal)  Resulted: 03/03/18 0834, Result status: Final result    Ordering provider: Wilber Qureshi MD  03/03/18 0633 Resulting lab: POINT OF CARE TEST, GLUCOSE    Specimen Information    Type Source Collected On     03/03/18 0633          Components       Value Reference Range Flag Lab   Glucose 100 70 - 99 mg/dL H 170            CBC with platelets differential [[id number]] (Abnormal)  Resulted: 03/03/18 0824, Result status: Final result    Ordering provider: Vladimir Garcia MD  03/03/18 0124 Resulting lab: [facility]    Specimen Information    Type Source Collected On   Blood  03/03/18 0707          Components       Value Reference Range Flag Lab   WBC 0.5 4.0 - 11.0 10e9/L LL FrRdHs   Comment:  .   Consistent with previous critical  result     RBC Count 2.42 3.8 - 5.2 10e12/L L FrRdHs   Hemoglobin 7.0 11.7 - 15.7 g/dL L FrRdHs   Hematocrit 21.3 35.0 - 47.0 % L FrRdHs   MCV 88 78 - 100 fl  FrRdHs   MCH 28.9 26.5 - 33.0 pg  FrRdHs   MCHC 32.9 31.5 - 36.5 g/dL  FrRdHs   RDW 15.1 10.0 - 15.0 % H FrRdHs   Platelet Count 23 150 - 450 10e9/L LL Rd   Comment:  .   Consistent with previous critical result     Diff Method Manual Differential   FrRdHs   % Neutrophils 57.0 %  FrRdHs   % Lymphocytes 27.0 %  FrRdHs   % Monocytes 10.0 %  FrRdHs   % Eosinophils 6.0 %  FrRdHs   % Basophils 0.0 %  FrRdHs   Absolute Neutrophil 0.3 1.6 - 8.3 10e9/L LL Rd   Comment:  .   Consistent with previous critical result     Absolute Lymphocytes 0.1 0.8 - 5.3 10e9/L L FrRdHs   Absolute Monocytes 0.1 0.0 - 1.3 10e9/L  FrRdHs   Absolute Eosinophils 0.0 0.0 - 0.7 10e9/L  FrRdHs   Absolute Basophils 0.0 0.0 - 0.2 10e9/L  FrRdHs   RBC Morphology Consistent with reported results   [facility]   Platelet Estimate Automated count confirmed.  Platelet morphology is normal.   [facility]            Basic metabolic panel [[id number]] (Abnormal)  Resulted: 03/03/18 0734, Result status: Final result    Ordering provider: Vladimir Garcia MD  03/03/18 0124 Resulting lab: [facility]    Specimen Information    Type Source Collected On   Blood  03/03/18 0707          Components       Value Reference Range Flag Lab   Sodium 138 133 - 144 mmol/L  FrRdHs   Potassium 4.0 3.4 - 5.3 mmol/L  FrRdHs   Chloride 106 94 - 109 mmol/L  FrRdHs   Carbon Dioxide 25 20 - 32 mmol/L  FrRdHs   Anion Gap 7 3 - 14 mmol/L  FrRdHs   Glucose 103 70 - 99 mg/dL H FrRdHs   Urea Nitrogen 87 7 - 30 mg/dL H FrRdHs   Creatinine 2.35 0.52 - 1.04 mg/dL H FrRdHs   GFR Estimate 19 >60 mL/min/1.7m2 L [facility]   Comment:  Non  GFR Calc   GFR Estimate If Black 24 >60 mL/min/1.7m2 L [facility]   Comment:  African American GFR Calc   Calcium 8.3 8.5 - 10.1 mg/dL L [facility]            Glucose by meter  [[id number]]  Resulted: 03/03/18 0432, Result status: Final result    Ordering provider: Wilber Qureshi MD  03/03/18 0217 Resulting lab: POINT OF CARE TEST, GLUCOSE    Specimen Information    Type Source Collected On     03/03/18 0217          Components       Value Reference Range Flag Lab   Glucose 75 70 - 99 mg/dL  170            Testing Performed By     Lab - Abbreviation Name Director Address Valid Date Range    12 - [facility] Unknown [address] [facility] [address] 05/08/15 1057 - Present    75 - Unknown [facility] Unknown [address] [facility] [address] 01/15/15 1019 - Present    170 - Unknown POINT OF CARE TEST, GLUCOSE Unknown Unknown 10/31/11 1114 - Present            Unresulted Labs     None      Encounter-Level Documents:     There are no encounter-level documents.      Order-Level Documents:     There are no order-level documents.

## 2018-03-02 NOTE — IP AVS SNAPSHOT
` ` Patient Information     Patient Name Sex     Josy Thomson ([id number]) Female 1927       Room Bed    0327 0327-01      Patient Demographics     Address Phone    [address] [facility] [address] [phone] (Home)  none (Work)  [phone] (Mobile) *Preferred*      Patient Ethnicity & Race     Ethnic Group Patient Race    American White      Emergency Contact(s)     Name Relation Home Work Mobile    ARIEL ALCARAZ Daughter [phone] none [phone]    MYLENE ALCARAZ Relative [phone] NONE [phone]      Documents on File        Status Date Received Description       Documents for the Patient    Privacy Notice - [place] Received 12     Insurance Card Received 12     External Medication Information Consent       Patient ID Received 18 lifetime    Consent for Services - Hospital/Clinic Received () 06/15/12     Consent for EHR Access  13 Copied from existing Consent for services - C/HOD collected on 06/15/2012    [facility] Specified Other       Consent for Services - Hospital/Clinic Received () 10/11/13     Insurance Card Received 01/21/15     Consent for Services - Hospital/Clinic Received () 01/21/15     Advance Directives and Living Will Received 10/13/15 HEALTH CARE DIRECTIVE 2014    Advance Directives and Living Will Not Received  VALIDATION OF AD 2014    Consent for Services/Privacy Notice - Hospital/Clinic Received () 16     HIM DAMON Authorization  16 [employer]    Insurance Card Received 17 MED[employer]    Consent for Services/Privacy Notice - Hospital/Clinic Received () 17     Care Everywhere Prospective Auth Received 10/18/17     Advance Directives and Living Will Not Received  Statutory Short form Power of     Consent for Services/Privacy Notice - Hospital/Clinic Received 18     Consent for Services - Hospital/Clinic  (Deleted)      Consent for Services - Hospital/Clinic   (Deleted)      Consent for Services - Hospital/Clinic  (Deleted)      Advance Directives and Living Will Not Received (Deleted)  VALIDATION OF AD 12/26/2014       Documents for the Encounter    CMS IM for Patient Signature Received 03/03/18       Admission Information     Attending Provider Admitting Provider Admission Type Admission Date/Time    Jerica Olivier MD Her, Doua, MD Emergency 03/02/18 2044    Discharge Date Hospital Service Auth/Cert Status Service Area     General [facility]    Unit Room/Bed Admission Status        3 MEDICAL SURGICAL 0327/[id number] Admission (Confirmed)       Admission     Complaint    HCAP (healthcare-associated pneumonia)      Hospital Account     Name Acct ID Class Status Primary Coverage    Josy Thomson [id number] Inpatient Open MEDICA - [employer] NON/FV PARTNERS            Guarantor Account (for Hospital Account #[id number])     Name Relation to Pt Service Area Active? Acct Type    Josy Thomson  FCS Yes Personal/Family    Address Phone          [address] [place] [address] [phone](H)  none(O)              Coverage Information (for Hospital Account #[id number])     F/O Payor/Plan Precert #    MEDICA/[employer] NON/FV PARTNERS     Subscriber Subscriber #    Josy Thomson [id number]    Address Phone    [address] [address]  [place] [address] [phone]

## 2018-03-02 NOTE — IP AVS SNAPSHOT
` `     [place] [address] MEDICAL SURGICAL: [phone]                 INTERAGENCY TRANSFER FORM - NOTES (H&P, Discharge Summary, Consults, Procedures, Therapies)   3/2/2018                    Hospital Admission Date: 3/2/2018  TIFFANY THOMSON   : 1927  Sex: Female        Patient PCP Information     Provider PCP Type    Abeba Bradford MD General         History & Physicals      H&P by Vladimir Garcia MD at 3/3/2018 12:19 AM     Author:  Vladimir Garcia MD Service:  Hospitalist Author Type:  Physician    Filed:  3/3/2018 12:23 AM Date of Service:  3/3/2018 12:19 AM Creation Time:  3/2/2018 11:53 PM    Status:  Addendum :  Vladimir Garcia MD (Physician)         [facility]    History and Physical  Hospitalist       Date of Admission:  3/2/2018  Date of Service (when I saw the patient): 18    Assessment & Plan   Tiffany Thomson is a 89 year old female patient with past medical history of myelodysplastic disorder, anemia, hypertension, arthritis, recent influenza infection, was brought from transitional care unit for evaluation for change in mental status.  History was obtained from patient's daughters.  Patient was febrile and tachycardic.  Laboratory workup showed WBC 0.3, creatinine 3.4.  CT of the abdomen/pelvis showed bibasilar consolidation suspicious for pneumonia.  She was given IV vancomycin and cefepime.  She was admitted to [facility] for further management.    1.  Healthcare associated pneumonia/neutropenic fever.  --Here WBC 0.3, ANC was not calculated.  CT imaging is suspicious for bibasilar pneumonia.  Will repeat given a dose of cefepime and vancomycin.  Will also continue IV fluid.  Will continue cefepime and vancomycin for broad coverage.  Blood culture collected in the emergency room.    2.  Acute toxic/infectious encephalopathy secondary to above: Patient has confusion and mild agitation.  Per family, her symptoms improving after she came to  emergency room.  We will continue IV fluid and IV antibiotic as above.    3.  Pancytopenia with history of myelodysplastic disorder: Patient was recently admitted to this facility and received 2 units of PRBC during her hospital course.  Will monitor hemoglobin and transfuse if hemoglobin less than 7.0.[CJ1.1]  She follows with [facility].  TOVA[CJ1.2]ill consult hematology for recommendations due to her neutropenic fever.    4.  Acute kidney injury, likely due to decreased intake, sepsis: She was given IV fluid bolus in the ER.  We will put her on normal saline at 100 mL/h.  Will monitor renal function.    We will admit patient to [facility] for further management.  Plan was discussed with her daughters.     DVT Prophylaxis: Pneumatic Compression Devices    Code Status: DNR / DNI.  Confirmed with patient's daughters    Disposition: Expected discharge: Anticipate more than 2 nights of hospital course until pneumonia improves    Vladimir Garcia MD    Primary Care Physician   Abeba Bradford    Chief Complaint   Change in mental status    History is obtained from the patient    History of Present Illness   Josy Thomson is a 89 year old female patient with past medical history of myelodysplastic disorder, anemia, hypertension, arthritis, recent admission to this facility for dehydration, was brought from transitional care unit for evaluation for change in mental status.  History was obtained from patient's daughters.  Patient was admitted to this facility from 02/16 to 02/24 for generalized weakness with dehydration, diarrhea, encephalopathy,and decreased intake.  Patient was given IV hydration.  She was also transfused with 2 units of PRBC.  She was transferred to TCU due to marked deconditioning.  At TCU, she had influenza infection and was started on Tamiflu 5 days ago.  She completed Tamiflu dose today.  Per family, patient was having confusion and agitation since last evening.  Her symptoms got worse today. She also had  fever.  She was brought to emergency room for evaluation.  In the ER,her vital signs showed temperature 98.4, heart rate 111, blood pressure 100/65, oxygen saturation 93% on room air.  Laboratory tests showed sodium 136, potassium 4.5, creatinine 3.4, lactic acid 1.1.  WBC 0.3, hemoglobin 18.7.  BNP 36083, troponin less than 0.015.  CT of the head without contrast is negative for acute intracranial abnormality. Chest x-ray showed increased density in the perihilar regions bilaterally and left lower lobe.  CT of the abdomen/pelvis showed bibasilar consolidation suspicious for pneumonia.  She was given IV vancomycin cefepime emergency room.  She was also given IV fluid.  She was admitted to [facility] for further management.    Past Medical History    I have reviewed this patient's medical history and updated it with pertinent information if needed.[CJ1.1]   Past Medical History:   Diagnosis Date     Anemia      Arthritis      History of blood transfusion      History of pneumonia      Hypertension      Macular degeneration      Tachycardia[CJ1.3]        Past Surgical History   I have reviewed this patient's surgical history and updated it with pertinent information if needed.[CJ1.1]  Past Surgical History:   Procedure Laterality Date     BONE MARROW BIOPSY, BONE SPECIMEN, NEEDLE/TROCAR Right 10/12/2015    Procedure: BIOPSY BONE MARROW;  Surgeon: David Mercado MD;  Location: [facility] OR     ENT SURGERY       ORTHOPEDIC SURGERY  2006    right shoulder pinning[CJ1.3]       Prior to Admission Medications   Prior to Admission Medications   Prescriptions Last Dose Informant Patient Reported? Taking?   B Complex Vitamins (B COMPLEX 1 PO)   Yes No   Sig: Take 1 tablet by mouth daily    DARBEPOETIN KEVIN-POLYSORBATE IJ   Yes No   Sig: Inject 4 mLs as directed Every other week   Multiple Vitamins-Minerals (OCUVITE PO)   Yes No   Sig: Take 1 tablet by mouth 2 times daily   acetaminophen (TYLENOL) 325 MG tablet   No No   Sig: Take 2  tablets by mouth every 4 hours as needed.   calcium carb 1250 mg, 500 mg [place],/vitamin D 200 units (OSCAL WITH D) 500-200 MG-UNIT per tablet  Other Yes No   Sig: Take 2 tablets by mouth daily    cyanocobalamin (VITAMIN B12) 1000 MCG/ML injection   Yes No   Sig: Inject 1 mL into the muscle every 30 days   deferoxamine   Yes No   Sig: Inject 2,000 mg into the vein Every 2 weeks With blood transfusions   metoprolol (TOPROL-XL) 100 MG 24 hr tablet  Other Yes No   Sig: Take 100 mg by mouth daily.   omeprazole (PRILOSEC) 20 MG capsule  Other Yes No   Sig: Take 40 mg by mouth daily    venlafaxine (EFFEXOR XR) 75 MG 24 hr capsule   Yes No   Sig: Take 75 mg by mouth daily.        Facility-Administered Medications: None     Allergies   No Known Allergies    Social History   I have reviewed this patient's social history and updated it with pertinent information if needed. Josy Thomson[CJ1.1]  reports that she has never smoked. She has never used smokeless tobacco. She reports that she does not drink alcohol or use illicit drugs.[CJ1.3]    Family History   I have reviewed this patient's family history and updated it with pertinent information if needed.     Review of Systems   The 10 point Review of Systems is negative other than noted in the HPI or here.  Change in mental status    Physical Exam   Temp: 98.4  F (36.9  C) Temp src: Axillary BP: 106/60   Heart Rate: 101 Resp: 20 SpO2: 95 %      Vital Signs with Ranges  Temp:  [98.4  F (36.9  C)] 98.4  F (36.9  C)  Heart Rate:  [] 101  Resp:  [20] 20  BP: ()/(54-80) 106/60  SpO2:  [90 %-95 %] 95 %  114 lbs 10.23 oz    GEN: Confused, mildly agitated, appears comfortable, NAD.  HEENT:  Normocephalic/atraumatic, no scleral icterus, no nasal discharge, mouth moist.  CV:  Regular rate and rhythm, no murmur or JVD.  S1 + S2 noted, no S3 or S4.  LUNGS:  Clear to auscultation bilaterally without rales/rhonchi/wheezing/retractions.  Symmetric chest rise on inhalation  noted.  ABD:  Active bowel sounds, soft, non-tender/non-distended.  No rebound/guarding/rigidity.  EXT:  No edema or cyanosis.  Hands/feet warm to touch with good signs of peripheral perfusion.  No joint synovitis noted.  SKIN:  Dry to touch, no exanthems noted in the visualized areas.  NEURO:  Symmetric muscle strength, sensation to touch grossly intact.  No new focal deficits appreciated.    Data   Data reviewed today: CT of the abdomen/pelvis showed evidence of bibasilar consolidation suspicious for pneumonia.     Recent Results (from the past 24 hour(s))   POC US ABDOMEN LIMITED    Impression    PROCEDURE NOTE --> Emergency Bedside Ultrasound    Procedure Name: Modified RUSH exam    Preformed by: Wilber Qureshi MD    Indication - Hypotension    Probe: low frequency curvilinear probe    Windows - Hepatorenal, Splenorenal, Suprapubic, Subxyphoid, Abdominal Aortic Views, IVC, bilateral lung windows    Findings - No intraperitoneal free fluid, No pericardial effusion, No Pneumothorax, No Abdominal aortic aneurysm, <18mm, >30% collapsibility IVC, grossly normal contractility,     Impression  -hypovolemia preserved contractility no sign of obstructive shock    Images saved on ultrasound internal hard drive per protocol   XR Chest Port 1 View    Narrative    PORTABLE CHEST ONE VIEW   3/2/2018  9:36 PM     HISTORY: Fever.    COMPARISON: 2/17/2018 chest x-ray.      Impression    IMPRESSION: Moderate stable cardiomegaly. Mildly prominent central  pulmonary vasculature. Increased density in the perihilar regions  bilaterally and left lower lobe. These opacities could be caused by  congestive heart failure. Cannot exclude pneumonia in the right  perihilar region or left lower lobe.    Advanced bilateral shoulder degenerative changes.    ANURADHA SERRANO MD   Abd/pelvis CT no contrast - Stone Protocol    Narrative    CT ABDOMEN AND PELVIS WITHOUT CONTRAST   3/2/2018 10:37 PM     HISTORY: Abdominal pain, fever, altered mental  status.    TECHNIQUE: No IV contrast material. Radiation dose for this scan was  reduced using automated exposure control, adjustment of the mA and/or  kV according to patient size, or iterative reconstruction technique.    COMPARISON: CT scan from 6/15/2012.    FINDINGS: Scans through the lung bases demonstrate bibasilar  consolidation suspicious for pneumonia. I suspect there is bilateral  hilar adenopathy as well.    The noncontrast appearance of the liver is normal. There appears to be  a peripherally calcified porcelain type gallbladder with variable  density within it likely sludge or cholesterol gallstones. Polyps not  excluded. The low-density areas within the gallbladder are more  prominent than on the comparison study. Spleen is atrophic. Pancreas  is atrophic but otherwise unremarkable. No adrenal lesions. No kidney  stones or hydronephrosis. No contour deforming renal masses. There is  likely a left mid pole renal cortical cyst    No evidence of abdominal aortic aneurysm or retroperitoneal  adenopathy.    Scans through the pelvis demonstrate calcified uterine fibroid.  Bladder has a normal appearance. No evidence of diverticulitis or  appendicitis. No evidence of bowel obstruction. No free air or free  fluid.      Impression    IMPRESSION:  1. Bibasilar consolidation suspicious for pneumonia. There is likely  bilateral hilar adenopathy as well seen on the images that include a  portion of the lower chest.  2. There is a peripherally calcified porcelain type gallbladder with  mixed density material within it. Two low density round areas are more  prominent than on the comparison study and could represent cholesterol  gallstones. A polyp of some sort would be difficult to exclude. On  this study, there are two low-density areas in the gallbladder and  previously there was one.  3. No evidence of diverticulitis or appendicitis.    ANURADHA SERRANO MD   Head CT w/o contrast    Narrative    CT SCAN OF THE HEAD  WITHOUT CONTRAST   3/2/2018 10:38 PM     HISTORY: Agitation, acute altered mental status.      TECHNIQUE:  Axial images of the head and coronal reformations without  IV contrast material. Radiation dose for this scan was reduced using  automated exposure control, adjustment of the mA and/or kV according  to patient size, or iterative reconstruction technique.    COMPARISON: 2/16/2018.    FINDINGS: There is no evidence of intracranial hemorrhage, mass, acute  infarct or anomaly. There is generalized atrophy of the brain. There  is low attenuation in the white matter of the cerebral hemispheres  consistent with sequelae of small vessel ischemic disease.     The visualized portions of the sinuses and mastoids appear normal. The  bony calvarium and bones of the skull base appear intact. Bilateral  pseudophakia.      Impression    IMPRESSION:     1. No evidence of acute intracranial hemorrhage, mass, or herniation.  2. There is generalized atrophy of the brain. White matter changes are  present in the cerebral hemispheres that are consistent with small  vessel ischemic disease in this age patient.      BETHANY HUANG MD[CJ1.1]        Revision History        User Key Date/Time User Provider Type Action    > CJ1.2 3/3/2018 12:23 AM Vladimir Garcia MD Physician Addend     CJ1.3 3/3/2018 12:20 AM Vladimir Garcia MD Physician Sign     CJ1.1 3/2/2018 11:53 PM Vladimir Garcia MD Physician                      Discharge Summaries      Discharge Summaries by Jerica Olivier MD at 3/6/2018  1:06 PM     Author:  Jerica Olivier MD Service:  Hospitalist Author Type:  Physician    Filed:  3/6/2018  1:07 PM Date of Service:  3/6/2018  1:06 PM Creation Time:  3/6/2018  1:01 PM    Status:  Addendum :  Jerica Olivier MD (Physician)         [facility]  Discharge Summary        Josy Thomson MRN# [id number]   YOB: 1927 Age: 89 year old     Date of Admission:  3/2/2018  Date of  Discharge:  3/6/2018  Admitting Physician:  Jerica Olivier MD  Discharge Physician: Jerica Olivier MD  Discharging Service: Hospitalist     Primary Provider: Dr. Abeba Chance  Primary Care Physician Phone Number: [phone]         Discharge Diagnoses/Problem Oriented Hospital Course (Providers):    Joys Thomson was admitted on 3/2/2018 by Jerica Olivier MD and I would refer you to their history and physical.      Patient was seen and examined on the day of discharge    Hospital course:  1. Acute acute sepsis secondary to sepsis secondary to healthcare associated pneumonia/neutropenic fever   2. Acute infectious encephalopathy  3. Chronic pancytopenia secondary to myelodysplastic disorder  4. Acute kidney injury secondary to decreased intake/sepsis    Hospital course:  1. Healthcare associated pneumonia/neutropenic fever: Josy Thomson is a 89 year old female patient with past medical history of myelodysplastic disorder, anemia, hypertension, arthritis, recent influenza infection, was brought from transitional care unit for evaluation for change in mental status.  History was obtained from patient's daughters.  Patient was febrile and tachycardic.  Laboratory workup showed WBC 0.3, creatinine 3.4.  CT of the abdomen/pelvis showed bibasilar consolidation suspicious for pneumonia.  She was given IV vancomycin and cefepime.  She was [place] [facility] [place].  Respiratory status did improve but she did require some low-dose FiO2 at discharge.  She remains lethargic and has progressively failed to improve from a functional perspective.  A care conference was held with the family, specifically her 2 daughters with myself along with the palliative care team and they have decided on hospice at discharge.  Patient continues to decline and has a guarded to poor prognosis given her significant neutropenia and pancytopenia.  Oncology was also consulted who recommended no further management of her MDS.  This, the patient was  made comfort care and will be transitioned to long-term care with hospice support.  2. Acute infectious encephalopathy: Secondary #1.  Remains lethargic and not able to engage much in terms of conversation.  3. Pancytopenia secondary to myelodysplastic disorder: Again, guarded to poor short-term prognosis.  Hospice at discharge.  4. Acute kidney injury, prerenal: Sepsis may be playing a component.  Given decision to transition to comfort cares and hospice at discharge, IV fluids were discontinued.      Disposition: Discharged to long-term care, [facility], with [facility].               Pending Results:        Unresulted Labs Ordered in the Past 30 Days of this Admission     Date and Time Order Name Status Description    3/2/2018 2221 Blood culture Preliminary     3/2/2018 2117 Blood culture Preliminary             Discharge Instructions and Follow-Up:      Follow-up Appointments     Follow Up and recommended labs and tests       Follow-up with [facility] team at discharge.                      Discharge Disposition:      Discharged to home         Discharge Medications:        Current Discharge Medication List      START taking these medications    Details   MEDICATION INSTRUCTION If care facility cannot accept or use ranges, facility is instructed to use lower end of dosing range    Associated Diagnoses: End of life care      HYDROmorphone, HIGH CONC, (DILAUDID) 10 mg/mL LIQD oral Place 0.2 mLs (2 mg) under the tongue every 4 hours And 0.2-0.4 ML (2-4 mg) q 2 hours prn breakthrough pain or dyspnea with respiratory rate greater than 20.  Qty: 30 mL, Refills: 0    Associated Diagnoses: End of life care      atropine 1 % ophthalmic solution Take 2-4 drops by mouth, place under tongue or place inside cheek every 2 hours as needed for other (terminal respiratory secretions) Not for ophthalmic use.  Qty: 5 mL, Refills: 0    Associated Diagnoses: End of life care      LORazepam (ATIVAN) 2  MG/ML (HIGH CONC) solution Take 0.25 mLs (0.5 mg) by mouth, place under tongue or insert rectally every 3 hours as needed for anxiety (restlessness)  Qty: 30 mL, Refills: 0    Associated Diagnoses: End of life care      haloperidol (HALDOL) 2 MG/ML (HIGH CONC) solution Take 0.25-0.5 mLs (0.5-1 mg) by mouth, place under tongue or insert rectally every 6 hours as needed for agitation (nausea)  Qty: 30 mL, Refills: 0    Associated Diagnoses: End of life care      bisacodyl (DULCOLAX) 10 MG Suppository Unwrap and insert 1 suppository rectally twice daily as needed for constipation.  Qty: 4 suppository, Refills: 0    Associated Diagnoses: End of life care      acetaminophen (TYLENOL) 650 MG Suppository Place 1 suppository (650 mg) rectally every 4 hours as needed for fever or mild pain (Do not exceed 4000 mg total acetaminophen per day.) Unwrap prior to insertion.  Qty: 12 suppository, Refills: 0    Associated Diagnoses: End of life care      diclofenac (VOLTAREN) 1 % GEL topical gel Apply 2 g topically 4 times daily To upper arms and shoulders  Qty: 1 Tube, Refills: 0    Comments: Please relabel tube from 3rd floor and send with pt.  Associated Diagnoses: End of life care         CONTINUE these medications which have NOT CHANGED    Details   albuterol (2.5 MG/3ML) 0.083% neb solution Take 1 vial by nebulization every 6 hours as needed for shortness of breath / dyspnea or wheezing      propylene glycol (SYSTANE BALANCE) 0.6 % SOLN ophthalmic solution Place 1 drop into both eyes 3 times daily         STOP taking these medications       HYDROmorphone HCl (DILAUDID PO) Comments:   Reason for Stopping:         HYDROmorphone HCl (DILAUDID PO) Comments:   Reason for Stopping:         LevoFLOXacin (LEVAQUIN PO) Comments:   Reason for Stopping:         trimethoprim-polymyxin b (POLYTRIM) ophthalmic solution Comments:   Reason for Stopping:         saccharomyces boulardii (FLORASTOR) 250 MG capsule Comments:   Reason for  Stopping:         Oseltamivir Phosphate (TAMIFLU PO) Comments:   Reason for Stopping:         Acetaminophen (TYLENOL PO) Comments:   Reason for Stopping:         Multiple Vitamins-Minerals (OCUVITE PO) Comments:   Reason for Stopping:         deferoxamine Comments:   Reason for Stopping:         cyanocobalamin (VITAMIN B12) 1000 MCG/ML injection Comments:   Reason for Stopping:         DARBEPOETIN KEVIN-POLYSORBATE IJ Comments:   Reason for Stopping:         B Complex Vitamins (B COMPLEX 1 PO) Comments:   Reason for Stopping:         venlafaxine (EFFEXOR XR) 75 MG 24 hr capsule Comments:   Reason for Stopping:         omeprazole (PRILOSEC) 20 MG capsule Comments:   Reason for Stopping:         metoprolol (TOPROL-XL) 100 MG 24 hr tablet Comments:   Reason for Stopping:         calcium carb 1250 mg, 500 mg [place],/vitamin D 200 units (OSCAL WITH D) 500-200 MG-UNIT per tablet Comments:   Reason for Stopping:                 Allergies:       No Known Allergies        Consultations This Hospital Stay:      Consultation during this admission received from oncology           Image Results From This Hospital Stay (For Non-EPIC Providers):        Results for orders placed or performed during the hospital encounter of 03/02/18   XR Chest Port 1 View    Narrative    PORTABLE CHEST ONE VIEW   3/2/2018  9:36 PM     HISTORY: Fever.    COMPARISON: 2/17/2018 chest x-ray.      Impression    IMPRESSION: Moderate stable cardiomegaly. Mildly prominent central  pulmonary vasculature. Increased density in the perihilar regions  bilaterally and left lower lobe. These opacities could be caused by  congestive heart failure. Cannot exclude pneumonia in the right  perihilar region or left lower lobe.    Advanced bilateral shoulder degenerative changes.    ANURADHA SERRANO MD   Head CT w/o contrast    Narrative    CT SCAN OF THE HEAD WITHOUT CONTRAST   3/2/2018 10:38 PM     HISTORY: Agitation, acute altered mental status.      TECHNIQUE:  Axial  images of the head and coronal reformations without  IV contrast material. Radiation dose for this scan was reduced using  automated exposure control, adjustment of the mA and/or kV according  to patient size, or iterative reconstruction technique.    COMPARISON: 2/16/2018.    FINDINGS: There is no evidence of intracranial hemorrhage, mass, acute  infarct or anomaly. There is generalized atrophy of the brain. There  is low attenuation in the white matter of the cerebral hemispheres  consistent with sequelae of small vessel ischemic disease.     The visualized portions of the sinuses and mastoids appear normal. The  bony calvarium and bones of the skull base appear intact. Bilateral  pseudophakia.      Impression    IMPRESSION:     1. No evidence of acute intracranial hemorrhage, mass, or herniation.  2. There is generalized atrophy of the brain. White matter changes are  present in the cerebral hemispheres that are consistent with small  vessel ischemic disease in this age patient.      BETHANY HUANG MD   Abd/pelvis CT no contrast - Stone Protocol    Narrative    CT ABDOMEN AND PELVIS WITHOUT CONTRAST   3/2/2018 10:37 PM     HISTORY: Abdominal pain, fever, altered mental status.    TECHNIQUE: No IV contrast material. Radiation dose for this scan was  reduced using automated exposure control, adjustment of the mA and/or  kV according to patient size, or iterative reconstruction technique.    COMPARISON: CT scan from 6/15/2012.    FINDINGS: Scans through the lung bases demonstrate bibasilar  consolidation suspicious for pneumonia. I suspect there is bilateral  hilar adenopathy as well.    The noncontrast appearance of the liver is normal. There appears to be  a peripherally calcified porcelain type gallbladder with variable  density within it likely sludge or cholesterol gallstones. Polyps not  excluded. The low-density areas within the gallbladder are more  prominent than on the comparison study. Spleen is atrophic.  Pancreas  is atrophic but otherwise unremarkable. No adrenal lesions. No kidney  stones or hydronephrosis. No contour deforming renal masses. There is  likely a left mid pole renal cortical cyst    No evidence of abdominal aortic aneurysm or retroperitoneal  adenopathy.    Scans through the pelvis demonstrate calcified uterine fibroid.  Bladder has a normal appearance. No evidence of diverticulitis or  appendicitis. No evidence of bowel obstruction. No free air or free  fluid.      Impression    IMPRESSION:  1. Bibasilar consolidation suspicious for pneumonia. There is likely  bilateral hilar adenopathy as well seen on the images that include a  portion of the lower chest.  2. There is a peripherally calcified porcelain type gallbladder with  mixed density material within it. Two low density round areas are more  prominent than on the comparison study and could represent cholesterol  gallstones. A polyp of some sort would be difficult to exclude. On  this study, there are two low-density areas in the gallbladder and  previously there was one.  3. No evidence of diverticulitis or appendicitis.    ANURADHA SERRANO MD   POC US ABDOMEN LIMITED    Impression    PROCEDURE NOTE --> Emergency Bedside Ultrasound    Procedure Name: Modified RUSH exam    Preformed by: Wilber Qureshi MD    Indication - Hypotension    Probe: low frequency curvilinear probe    Windows - Hepatorenal, Splenorenal, Suprapubic, Subxyphoid, Abdominal Aortic Views, IVC, bilateral lung windows    Findings - No intraperitoneal free fluid, No pericardial effusion, No Pneumothorax, No Abdominal aortic aneurysm, <18mm, >30% collapsibility IVC, grossly normal contractility,     Impression  -hypovolemia preserved contractility no sign of obstructive shock    Images saved on ultrasound internal hard drive per protocol[DH1.1]                Revision History        User Key Date/Time User Provider Type Action    > [N/A] 3/6/2018  1:07 PM Jerica Olivier MD Physician  Addend     DH1.1 3/6/2018  1:06 PM Jerica Olivier MD Physician Sign                     Consult Notes      Consults by Tiera Escobedo MSW at 3/6/2018 11:47 AM     Author:  Tiera Escobedo MSW Service:  Hospice Author Type:      Filed:  3/6/2018 11:47 AM Date of Service:  3/6/2018 11:47 AM Creation Time:  3/6/2018 11:38 AM    Status:  Signed :  Tiera Escobedo MSW ()         [facility] Consult    Writer and RN Meagan met with pts dtrs Jazmine and Karla in a private conference room for a hospice consult this morning.  Provided info about our philosophy of care, medicare benefit, and available services.  Pt will discharge to [facility] this afternoon, consents have been signed so that she will be on our service when she arrives.  Writer ordered liquid O2 to be delivered to the facility and all parties understand our team will visit tomorrow morning to complete the admission process.  Coordinated care with RICCARDO Emanuel and palliative provider Diamante for discharge planning.    Thank you for this referral.    NAVIN Tee, [facility]  [phone][MB1.1]     Revision History        User Key Date/Time User Provider Type Action    > MB1.1 3/6/2018 11:47 AM Tiera Escobedo MSW  Sign            Consults by Diamante Millan APRN CNS at 3/5/2018  9:30 AM     Author:  Diamante Millan APRN CNS Service:  Palliative Author Type:  Clinical Nurse Specialist    Filed:  3/5/2018  2:46 PM Date of Service:  3/5/2018  9:30 AM Creation Time:  3/5/2018  8:44 AM    Status:  Signed :  Diamante Millan APRN CNS (Clinical Nurse Specialist)         [facility]    Palliative Care Consultation   Text Page    Date of Admission:  3/2/2018    Assessment & Plan   Josy Thomson is a 89 year old female who was admitted on 3/2/2018. I was asked to see the patient for goals of care.    Recommendations:  1.[JW1.1] Pain - tylenol 500 mg PO or 650 mg AZ q 4  "hours prn mild pain or fever. Voltaren 1% gel to upper arms and shoulders QID. Hydromorphone 2 mg SL q 4 hours WA and q 2 hours prn pain or dyspnea with respiratory rate greater than 20. Hydromorphone 0.3-0.5 mg IV q 2 hours prn pain if sublingual prn doses ineffective. Position for comfort. Pt has increased pain with lifting, moving of arms and shoulders.  2. Dyspnea-  See hydromorphone in #1. o2 at 2-4 l/min prn dyspnea. Fan at bedside. Lorazepam 0.5-1 mg SL q 3 hours prn. Atropine 1% 1-2 gtts q 1 hour prn secretions. Albuterol neb q 4 hours prn.  3. Delirium - Haldol 0.5-1 mg SL q 6 hours prn agitation. Nursing delirium interventions as appropriate.[JW1.2]    Goal of Care: DNR DNI.[JW1.1] No feeding tube. Comfort Care. Pt does not display medical decision-making capacity. Dtr Jazmine is HCA. Dtr Karla also present. They verbalize that pt would not want to have life prolonging care and would like cares that promote pt comfort. \"We don't want her to have artificial treatments, just allow the natural process and keep her comfortable\".  Appreciate SWS Corinne assistance to coordinate meeting with hospice and work with dtrs to determine discharge facility. If pt survives, will discharge to facility with hospice care. Meeting set for 10 am 3/6 with  hospice.[JW1.2]    Disease Process/es & Symptoms:  Josy Thomson is a 89 year old patient admitted with symptoms of[JW1.1] progressive confusion and agitation[JW1.2].[JW1.1] S[JW1.2]he has been treated for[JW1.1] pneumonia, recent influenza, encephalopathy, MDS with anemia, neutropenia, and thrombocytopenia, acute kidney injury[JW1.2] .      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 Patient has moved to inpatient care from TCU for higher level of care and needs help with at least one ADL. There is a documented unitentional weight loss of 7 pounds over the past 12 months.[JW1.2]    The following symptoms are noted by[JW1.1] patient's daughters[JW1.2]  as concerning to her quality of life.[JW1.1]  Chronic pain  Dyspnea  Confusion  Deconditioning and loss of independence[JW1.2]    Psychosocial/Spiritual Needs:[JW1.1]  Pt is Lutheran and Shinto  Oriented to Spiritual Health and Social Work Services as part of Palliative Care team.  is following. Consultation placed for  to follow.[JW1.2]    Decision-Making & Goals of Care:  Discussion/counseling today about goals of care/decisions:[JW1.1]   3/5/2018[JW1.4]  Met at pt's bedside with dtr Jazmine and dtr Karla. They share that pt has been a very independent, strong person and has been able to live in her home for over 56 years up to her TCU admission after increased falling. \"Those falls really take it out of a person\".   They note that pt verbalized to a  at one point that she misses being able to sit in the sun, and was resentful to not be independent in her living any more. She \" was angry at me [daughter] for bringing her to the hospital earlier this month . . . She felt betrayed\". Jazmine and Karla verbalized that they have had the chance to speak with Dr. Morrissey and her partner, and the hospitalist about pt's condition. They understand that pt had influenza, and now has PNA, PAXTON in addition to her MDS with dependence on blood transfusions, and progressive decline in her mental status despite treatment. Jazmine states \"Mom never wanted to have all of these artificial things done to her . . . We want her to be kept comfortable\". We discussed artificial hydration. Pt is congested at present, in PAXTON/CKD with increasing swelling in her hands. It is " likely that additional IV fluids would increase her respiratory congestion, swelling and prolong her dying process. Frequent mouth care will help with feeling of thirst and if pt is awake, pt can eat or drink. Pt's often time lose the feeling of thirst or hunger as their bodies prepare for death. Jazmine and Karla agree with this. Dr. Olivier joined us. He recommended to stop antibiotics as well, as they will not likely provide any benefit to pt. Jazmine and Karla share with him that they want care to be focused on comfort. We agreed to [facility] consult, meeting with hospice. Described hospice philosophy, goal of care, processes. Also described options for where care is received. Jazmine does not feel that the family can care for pt with hospice at home. She would like to explore LTC or hospice home. She also would like to meet with a different hospice than the one they have met with previously.  We will make pt comfort care. I will order medications and treatment that focus on pt comfort. SWS Corinne is present and continued meeting with daughters to further explore hospice and discharge facility. Will also consult  for support and prayer at pt bedside.[JW1.2]    Patient has decision-making capacity[JW1.1] Unreliable[JW1.2]  Patient has a Health Care Agent named in a legal Advance Directive document dated 12/26/2014. See name(s) and contact information in Health Care Agent/Legal Guardian section below.  Name: Jazmine Diaz, Relationship: dtr  See contact information in ACP tab in Pt medical record.    Patient has a completed health care directive available in the chart (Y/N): Y  There is no POLST (Physician orders for life-sustaining treatment) form on file for this patient  Code Status: Do not resuscitate / Do not intubate     Findings & plan of care discussed with:[JW1.1] Dr. Olivier, [facility] Corinne,  Phan, bedside nurse Aron[JW1.2]  Follow-up plan from palliative team:[JW1.1] Will continue to follow this pt for  symptom management and support for pt and family with decision-making.[JW1.2]   Thank you for involving us in the patient's care.     Diamante PHIPPS, CNS  Pain Management and Palliative Care  [facility]  Pgr: [phone]    Time Spent on this Encounter   I spent[JW1.1] 10:45-11:45 am[JW1.2] in assessment of the patient and discussion with the family[JW1.1] at bedside[JW1.2]. Another[JW1.1] 40[JW1.2] minutes in review of chart, documentation and discussion with the health care team.    Reason for Consult   Reason for consult: I was asked by Dr. Olivier to evaluate this patient for Goals of care.    Primary Care Physician   Abeba Bradford    Chief Complaint    Progressive confusion and agitation x 24 hours.    History is obtained from the electronic health record and patient's daughter    History of Present Illness   Josy Thomson is a 89 year old female who presents with 24 hour history of worsening confusion and agitation with fever at her TCU.     Pt was discharged from [facility] on 2/24 after 8 day hospitalization for generalized weakness, dehydration, diarrhea, encephalopathy and poor PO intake to TCU. At TCU she developed influenza and was started on tamiflu which was completed on 3/2.     Upon readmission to [facility] ED, work up significant for total wbc of 0.3, hgb 7.0 gm/Dl, platelets of 37K, sCR 2.67 with GFR 17 and BUN 86, N-BNP 04399, troponin <0.015. Pt head CT without contrast negative for acute pathology, positive for brain atrophy. Cxr with increased density in perihilar regions bilat and L lower lobe. CT abdomen/pelvis reviewed bibasilar lung consolidation suspicious for PNA. Pt was admitted for antibiotics and IVF.[JW1.1] Despite treatment, pt displays progressive decrease in LOC, inability to eat, inability to participate in ADLs.[JW1.2]    Pt with known MDS, diagnosed 10/2015, followed by Dr. Morrissey at [facility]. She[JW1.1] has anemia, thrombocytopenia and  neutropenia and[JW1.2] receives prbc transfusions and EPO, but no other treatments due to risk of toxicities, pt performance status and age. Poor prognosis per note by Dr. Gagnon 3/4/2018.    Past Medical History   I have reviewed this patient's medical history and updated it with pertinent information if needed.[JW1.1]   Past Medical History:   Diagnosis Date     Anemia      Arthritis      History of blood transfusion      History of pneumonia      Hypertension      Macular degeneration      Tachycardia[JW1.5]        Past Surgical History[JW1.1]     Past Surgical History:   Procedure Laterality Date     BONE MARROW BIOPSY, BONE SPECIMEN, NEEDLE/TROCAR Right 10/12/2015    Procedure: BIOPSY BONE MARROW;  Surgeon: David Mercado MD;  Location: [facility] OR     ENT SURGERY       ORTHOPEDIC SURGERY  2006    right shoulder pinning[JW1.5]     Prior to Admission Medications   Prior to Admission Medications   Prescriptions Last Dose Informant Patient Reported? Taking?   Acetaminophen (TYLENOL PO) 3/1/2018 at Unknown time  Yes Yes   Sig: Take 500 mg by mouth every 4 hours as needed for mild pain or fever   B Complex Vitamins (B COMPLEX 1 PO) 3/2/2018 at am  Yes Yes   Sig: Take 1 tablet by mouth daily    DARBEPOETIN KEVIN-POLYSORBATE IJ   Yes No   Sig: Inject 4 mLs as directed Every other week   HYDROmorphone HCl (DILAUDID PO)   Yes Yes   Sig: Take 2 mg by mouth nightly as needed (break-through pain at night)   HYDROmorphone HCl (DILAUDID PO) 3/2/2018 at 1800  Yes Yes   Sig: Take 2 mg by mouth 3 times daily   LevoFLOXacin (LEVAQUIN PO) 3/2/2018 at 1200  Yes Yes   Sig: Take 250 mg by mouth daily For 5 days, last dose on 3-5-18   Multiple Vitamins-Minerals (OCUVITE PO) 3/2/2018 at 2nd-pm  Yes Yes   Sig: Take 1 tablet by mouth 2 times daily   Oseltamivir Phosphate (TAMIFLU PO) 3/2/2018 at pm  Yes Yes   Sig: Take 30 mg by mouth 2 times daily For 5 days (last day 3-3-18)   albuterol (2.5 MG/3ML) 0.083% neb solution   Yes Yes    Sig: Take 1 vial by nebulization every 6 hours as needed for shortness of breath / dyspnea or wheezing   albuterol (2.5 MG/3ML) 0.083% neb solution 3/2/2018 at 1800  Yes Yes   Sig: Take 1 vial by nebulization 3 times daily   calcium carb 1250 mg, 500 mg Nenana,/vitamin D 200 units (OSCAL WITH D) 500-200 MG-UNIT per tablet 3/2/2018 at am Other Yes Yes   Sig: Take 2 tablets by mouth daily    cyanocobalamin (VITAMIN B12) 1000 MCG/ML injection due 3-29-18 at Unknown time  Yes Yes   Sig: Inject 1 mL into the muscle every 30 days   deferoxamine   Yes No   Sig: Inject 2,000 mg into the vein Every 2 weeks With blood transfusions   metoprolol (TOPROL-XL) 100 MG 24 hr tablet 3/2/2018 at am Other Yes Yes   Sig: Take 100 mg by mouth daily.   omeprazole (PRILOSEC) 20 MG capsule 3/2/2018 at am Other Yes Yes   Sig: Take 40 mg by mouth daily    propylene glycol (SYSTANE BALANCE) 0.6 % SOLN ophthalmic solution 3/2/2018 at 1600  Yes Yes   Sig: Place 1 drop into both eyes 3 times daily   saccharomyces boulardii (FLORASTOR) 250 MG capsule 3/2/2018 at am  Yes Yes   Sig: Take 250 mg by mouth daily For 7 days (last day 3-8-18)   trimethoprim-polymyxin b (POLYTRIM) ophthalmic solution 3/2/2018 at 2000  Yes Yes   Sig: Place 1 drop Into the left eye 4 times daily For 7 days (llast day 3-5-18)   venlafaxine (EFFEXOR XR) 75 MG 24 hr capsule 3/2/2018 at am  Yes Yes   Sig: Take 75 mg by mouth daily.        Facility-Administered Medications: None     Allergies   No Known Allergies    Social History   I have updated and reviewed the following Social History Narrative:   Social History     Social History Narrative      Living situation:[JW1.1] prior to rehab at [facility], pt was living in her home with services.[JW1.6]  Family system:[JW1.1] . 3 adult children. Supportive. Oldest son is in rehab. Middle daughter Jazmine is HCA and POA. Youngest daughter Karla is here from [place].[JW1.6]   Functional status (needs help with ADLs or IADLs):[JW1.1] Pt  is totally dependent for cares.[JW1.6]  Employment/education:[JW1.1] Pt was  and raised her children. 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.[JW1.6]  Use of community resources:[JW1.1] [employer] Qureshi.[JW1.6]   Activities/interests:[JW1.1] Sitting in the sun, being with family and friends.[JW1.6]  History of substance use/abuse:[JW1.1] Not assessed.[JW1.6]  Tenriism affiliation:[JW1.1] Jewish - does not go to formal Jain but is Orthodoxy, believes in [employer], and welcome prayers to Gigi.[JW1.6]   Involvement in domitila community:[JW1.1] none[JW1.6]  Impact of illness on patient:[JW1.1] Pt prognosis is days to weeks.[JW1.6]    Family History   I have reviewed this patient's family history and updated it with pertinent information if needed.[JW1.1]   No family history on file.[JW1.5]    Review of Systems[JW1.1] /[JW1.6]Palliative Symptom Review[JW1.1]   GRIS due to pt LOC.[JW1.6]    Physical Exam   Temp:  [97.2  F (36.2  C)-98.3  F (36.8  C)] 97.9  F (36.6  C)  Heart Rate:  [100-115] 115  Resp:  [20-26] 24  BP: (122-158)/(51-79) 132/61  SpO2:  [84 %-100 %] 95 %  131 lbs 11.2 oz  GEN:[JW1.1]  Somulent - rouses when I attempt to lift up her arms[JW1.6], appears comfortable, NAD.  HEENT:  Normocephalic/atraumatic, no scleral icterus, no nasal discharge, mouth[JW1.1] dry[JW1.6].  CV:[JW1.1]  Tachy[JW1.6], S1, S2[JW1.1], S4[JW1.6];  +3 DP/PT pulses bilatererally; no edema BLE.[JW1.1] +2 Edema BUEs.[JW1.6]  RESP:[JW1.1]  bilat RH with exp wheezes, and prolonged expiration[JW1.6].  Symmetric chest rise on inhalation noted.[JW1.1]  Labored breathing. O2 at 2l/min NC.[JW1.6]  ABD:  Rounded, soft, non-tender/non-distended.  +BS  EXT:  Edema & pulses as noted above.  CMS intact x 4.     M/S:[JW1.1]   Bilateral arm and shoulder pain with any movement of her arms, knee pain from osteoarthrits - deferred extensive palpation.[JW1.6]    SKIN:[JW1.1]  Sl diaphoretic[JW1.6], no  exanthems noted in the visualized areas.    NEURO:[JW1.1] MOLINA. Spontaneous movement but does not follow commands[JW1.6]  Psych:[JW1.1]  Somulent[JW1.6] affect.[JW1.1]  Non-verbal. Rouses when her arms are moved due to pain.[JW1.6]    Delirium Screen/CAM:[JW1.1]  GRIS due to LOC.[JW1.6]  Data   Results for orders placed or performed during the hospital encounter of 03/02/18 (from the past 24 hour(s))   Vancomycin level   Result Value Ref Range    Vancomycin Level 9.6 mg/L   Lactic acid level STAT   Result Value Ref Range    Lactic Acid 1.3 0.7 - 2.0 mmol/L   CBC with platelets differential   Result Value Ref Range    WBC 0.8 (LL) 4.0 - 11.0 10e9/L    RBC Count 2.53 (L) 3.8 - 5.2 10e12/L    Hemoglobin 7.2 (L) 11.7 - 15.7 g/dL    Hematocrit 22.2 (L) 35.0 - 47.0 %    MCV 88 78 - 100 fl    MCH 28.5 26.5 - 33.0 pg    MCHC 32.4 31.5 - 36.5 g/dL    RDW 15.4 (H) 10.0 - 15.0 %    Platelet Count 16 (LL) 150 - 450 10e9/L    Diff Method Manual Differential     % Neutrophils 78.0 %    % Lymphocytes 18.0 %    % Monocytes 1.0 %    % Eosinophils 3.0 %    % Basophils 0.0 %    Absolute Neutrophil 0.6 (L) 1.6 - 8.3 10e9/L    Absolute Lymphocytes 0.1 (L) 0.8 - 5.3 10e9/L    Absolute Monocytes 0.0 0.0 - 1.3 10e9/L    Absolute Eosinophils 0.0 0.0 - 0.7 10e9/L    Absolute Basophils 0.0 0.0 - 0.2 10e9/L    Acanthocytes Moderate     Dohle Bodies Present     Toxic Granulation Present     Platelet Estimate       Automated count confirmed.  Giant platelets are present.   Basic metabolic panel   Result Value Ref Range    Sodium 148 (H) 133 - 144 mmol/L    Potassium 3.6 3.4 - 5.3 mmol/L    Chloride 118 (H) 94 - 109 mmol/L    Carbon Dioxide 23 20 - 32 mmol/L    Anion Gap 7 3 - 14 mmol/L    Glucose 120 (H) 70 - 99 mg/dL    Urea Nitrogen 88 (H) 7 - 30 mg/dL    Creatinine 1.86 (H) 0.52 - 1.04 mg/dL    GFR Estimate 25 (L) >60 mL/min/1.7m2    GFR Estimate If Black 31 (L) >60 mL/min/1.7m2    Calcium 8.6 8.5 - 10.1 mg/dL[JW1.1]              Revision  History        User Key Date/Time User Provider Type Action    > JW1.4 3/5/2018  2:46 PM Diamante Millan APRN CNS Clinical Nurse Specialist Sign     JW1.2 3/5/2018  1:36 PM Diamante Millan APRN CNS Clinical Nurse Specialist      JW1.6 3/5/2018 12:04 PM Diamante Millan APRN CNS Clinical Nurse Specialist      JW1.3 3/5/2018  9:32 AM Diamante Millan APRN CNS Clinical Nurse Specialist      JW1.5 3/5/2018  9:14 AM Diamante Millan APRN CNS Clinical Nurse Specialist      JW1.1 3/5/2018  8:44 AM Diamante Millan APRN CNS Clinical Nurse Specialist             Consults by White, Corinne C, LSW at 3/5/2018  1:03 PM     Author:  White, Corinne C, LSW Service:  (none) Author Type:      Filed:  3/5/2018  1:03 PM Date of Service:  3/5/2018  1:03 PM Creation Time:  3/5/2018 12:56 PM    Status:  Signed :  White, Corinne C, LSW ()     Consult Orders:    1. Social Work IP Consult [[id number]] ordered by Diamante Millan APRN CNS at 03/05/18 1145                Care Transition Initial Assessment -   Reason For Consult: care coordination/care conference, community resources, discharge planning  Met with: Patient and Family    Active Problems:    HCAP (healthcare-associated pneumonia)       DATA  Lives With: alone  Living Arrangements: house  Description of Support System: Supportive, Involved  Who is your support system?: Children, Facility resident(s)/Staff  Support Assessment: Lacks necessary supervision and assistance, Lacks adequate physical care, Caregiver experiencing high stress, Caregiver difficulty providing physical care/safety.   Identified issues/concerns regarding health management: Pt admitted from TCU and now moving to comfort care with Hospice       Resources List: Skilled Nursing Facility, came from [facility],. Would like to return if bed available    Hospice- set up meeting with Hospice for tomorrow @10 am         Transportation Available: will need stretcher  transport   ASSESSMENT  Pt is not alert. Met with pt's daughters. They are not comfortable with taking pt home for Hospice support. They are willing to have pt return to NH level of care with support of Hospice. They met with a provider at NH facility but not comfortable with that program   Set up meeting for tomorrow with HC      PLAN  Financial costs for the patient includes $5000 deposit for Placement with Hospice support  .  Patient given options and choices for discharge yes .  Will follow up with family following Hospice meeting.  Will need stretcher transport[CW1.1]        Revision History        User Key Date/Time User Provider Type Action    > CW1.1 3/5/2018  1:03 PM White, Corinne C, YANETHW  Sign            Consults by Alexandra Potter CM at 3/3/2018  5:01 PM     Author:  Alexandra Potter CM Service:  Care Coordinator Author Type:      Filed:  3/3/2018  5:01 PM Date of Service:  3/3/2018  5:01 PM Creation Time:  3/3/2018  4:50 PM    Status:  Signed :  Alexandra Potter CM ()     Consult Orders:    1. Care Coordinator IP Consult [[id number]] ordered by Jerica Olivier MD at 03/03/18 1644                Care Transition Initial Assessment - RN    Reason For Consult: care coordination/care conference, community resources, discharge planning   Information obtained through chart review.  DATA   Active Problems:    HCAP (healthcare-associated pneumonia)       Cognitive Status: awake and confused.  Primary Care Clinic Name: [facility]  Primary Care MD Name: Dr. Abeba Bradford  Contact information and PCP information verified: Yes  Lives With: alone  Living Arrangements: house     Description of Support System: Supportive, Involved   Who is your support system?: Children, Facility resident(s)/Staff   Support Assessment: Lacks necessary supervision and assistance, Lacks adequate physical care, Caregiver experiencing high stress, Caregiver difficulty providing  physical care/safety   Insurance concerns: No Insurance issues identified  ASSESSMENT  Patient currently receives the following services:  When at home, daughter Jazmine, is PCA. Not other services. Patient has Preciousa RICCARDO [place] [phone].         Identified issues/concerns regarding health management: Home safety. Daughter, Jazmine, is patient's PCA. She provides daily AM cares. Jazmine will prep meals for the day as patient cannot cook for herself; assist with hygiene cares. Patient has macular degeneration w/very poor eye sight and very [place]. Mentation at baseline is very sharp. She has been experiencing confusion the last 2 admissions. Jazmine does all grocery shopping, pays bills, provides transportation for patient. Also of concern, patient has had multiple falls at home. She has a Life Alert but forgets to push it.  Prior to last admission (2/16/18) there was concern for caregiver fatigue. The patient was discharged to [facility] after last admission. Unclear where [facility] is in the process to obtain additional help.   PLAN  Financial costs for the patient were not discussed.  Patient given options and choices for discharge.  Patient/family is agreeable to the plan?  Yes  Patient anticipates discharging back to TCU pending family care conference.      Patient anticipates needs for home equipment: No  Discharge Planner   Discharge Plans in progress: Yes  Barriers to discharge plan: IV antibiotics  Plan/Disposition: TCU pending Pallitive recommendations.   Appointments: TBD    CM will continue to follow patient until discharge for any additional needs.     Glo Potter RN, BSN, CTS  [facility]  [phone][BS1.1]           Revision History        User Key Date/Time User Provider Type Action    > BS1.1 3/3/2018  5:01 PM Alexandra Potter CM  Sign            Consults by Gopi Gagnon MD at 3/3/2018 12:31 PM     Author:  Gopi Gagnon MD Service:  Oncology Author Type:  Physician     Filed:  3/3/2018 12:31 PM Date of Service:  3/3/2018 12:31 PM Creation Time:  3/3/2018 12:14 PM    Status:  Signed :  Gopi Gagnon MD (Physician)     Consult Orders:    1. Hematology & Oncology IP Consult: Pancytopenia, admitted for pneumonia/neutropenic fever; Consultant may enter orders: Yes; Patient to be seen: Routine - within 24 hours; Requested Clinic/Group: [facility] [[id number]] ordered by Vladimir Garcia MD at 03/03/18 0021                [facility]    Oncology Consultation     Date of Admission:  3/2/2018  Date of Consult (When I saw the patient): 03/03/18[EW1.1]    Assessment & Plan[EW1.2]   Josy Thomson is a 89 year old female who was admitted on 3/2/2018. I was asked to see the patient for myelodysplasia.      Myelodysplasia  -followed by Dr. 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, 5q-, low risk cytogenetics, <5%blasts  -has since received erythropoietin and supportive transfusions  -has declined lenalidomide or other therapy due to potential toxicities and her age/compromised performance status  -baseline severe pancytopenia (ANC <0.5, hgb <7, plt 30-40)  -continue with supportive care  -long term prognosis very poor and may benefit from family conference and palliative care input to review goals of therapy once acute deterioration is stabilized    Pancytopenia  -due to myelodysplasia  -renal failure may be contributing to anemia  -continue erythropoietin   -continue red cell transfusions for hemoglobin <7 and platelets for platelet < 10,000 or bleeding  -can consider short term addition of neupogen if fever persists  -monitor CBC serially    Secondary Hemachromatosis  -related to frequent transfusions  -continue desferal    ID  -healthcare associated pneumonia  -agree with broad spectrum antibiotics    Renal Failure  -on IV fluids  -appreciate hospitalist care    Encephalopathy  -monitor with supportive care    Liver Function Test Elevations  -iron over  load and infection  may be contributing  -CT abdomen noted  -monitor[EW1.1]      Gopi Pateljason    Code Status    Prior    Primary Care Physician   Abeba Bradford    History of Present Illness[EW1.2]   Josy Thomson is a 89 year old female who presents with fever.[EW1.1]    Past Medical History[EW1.2]   I have reviewed this patient's medical history and updated it with pertinent information if needed.[EW1.1]   Past Medical History:   Diagnosis Date     Anemia      Arthritis      History of blood transfusion      History of pneumonia      Hypertension      Macular degeneration      Tachycardia        Past Surgical History[EW1.2]   I have reviewed this patient's surgical history and updated it with pertinent information if needed.[EW1.1]  Past Surgical History:   Procedure Laterality Date     BONE MARROW BIOPSY, BONE SPECIMEN, NEEDLE/TROCAR Right 10/12/2015    Procedure: BIOPSY BONE MARROW;  Surgeon: David Mercado MD;  Location: [facility] OR     ENT SURGERY       ORTHOPEDIC SURGERY  2006    right shoulder pinning       Prior to Admission Medications   Prior to Admission Medications   Prescriptions Last Dose Informant Patient Reported? Taking?   Acetaminophen (TYLENOL PO) 3/1/2018 at Unknown time  Yes Yes   Sig: Take 500 mg by mouth every 4 hours as needed for mild pain or fever   B Complex Vitamins (B COMPLEX 1 PO) 3/2/2018 at am  Yes Yes   Sig: Take 1 tablet by mouth daily    DARBEPOETIN KEVIN-POLYSORBATE IJ   Yes No   Sig: Inject 4 mLs as directed Every other week   HYDROmorphone HCl (DILAUDID PO)   Yes Yes   Sig: Take 2 mg by mouth nightly as needed (break-through pain at night)   HYDROmorphone HCl (DILAUDID PO) 3/2/2018 at 1800  Yes Yes   Sig: Take 2 mg by mouth 3 times daily   LevoFLOXacin (LEVAQUIN PO) 3/2/2018 at 1200  Yes Yes   Sig: Take 250 mg by mouth daily For 5 days, last dose on 3-5-18   Multiple Vitamins-Minerals (OCUVITE PO) 3/2/2018 at 2nd-pm  Yes Yes   Sig: Take 1 tablet by mouth 2 times daily    Oseltamivir Phosphate (TAMIFLU PO) 3/2/2018 at pm  Yes Yes   Sig: Take 30 mg by mouth 2 times daily For 5 days (last day 3-3-18)   albuterol (2.5 MG/3ML) 0.083% neb solution   Yes Yes   Sig: Take 1 vial by nebulization every 6 hours as needed for shortness of breath / dyspnea or wheezing   albuterol (2.5 MG/3ML) 0.083% neb solution 3/2/2018 at 1800  Yes Yes   Sig: Take 1 vial by nebulization 3 times daily   calcium carb 1250 mg, 500 mg Nikolski,/vitamin D 200 units (OSCAL WITH D) 500-200 MG-UNIT per tablet 3/2/2018 at am Other Yes Yes   Sig: Take 2 tablets by mouth daily    cyanocobalamin (VITAMIN B12) 1000 MCG/ML injection due 3-29-18 at Unknown time  Yes Yes   Sig: Inject 1 mL into the muscle every 30 days   deferoxamine   Yes No   Sig: Inject 2,000 mg into the vein Every 2 weeks With blood transfusions   metoprolol (TOPROL-XL) 100 MG 24 hr tablet 3/2/2018 at am Other Yes Yes   Sig: Take 100 mg by mouth daily.   omeprazole (PRILOSEC) 20 MG capsule 3/2/2018 at am Other Yes Yes   Sig: Take 40 mg by mouth daily    propylene glycol (SYSTANE BALANCE) 0.6 % SOLN ophthalmic solution 3/2/2018 at 1600  Yes Yes   Sig: Place 1 drop into both eyes 3 times daily   saccharomyces boulardii (FLORASTOR) 250 MG capsule 3/2/2018 at am  Yes Yes   Sig: Take 250 mg by mouth daily For 7 days (last day 3-8-18)   trimethoprim-polymyxin b (POLYTRIM) ophthalmic solution 3/2/2018 at 2000  Yes Yes   Sig: Place 1 drop Into the left eye 4 times daily For 7 days (llast day 3-5-18)   venlafaxine (EFFEXOR XR) 75 MG 24 hr capsule 3/2/2018 at am  Yes Yes   Sig: Take 75 mg by mouth daily.        Facility-Administered Medications: None     Allergies   No Known Allergies    Social History[EW1.2]   I have reviewed this patient's social history and updated it with pertinent information if needed. Josy Thomson[EW1.1]  reports that she has never smoked. She has never used smokeless tobacco. She reports that she does not drink alcohol or use illicit  drugs.    Family History[EW1.2]   I have reviewed this patient's family history and updated it with pertinent information if needed.[EW1.1]   No family history on file.    Review of Systems[EW1.2]   The 5 point Review of Systems is negative other than noted in the HPI or here.[EW1.1]     Physical Exam   Temp: 100.7  F (38.2  C) Temp src: Temporal BP: 115/48   Heart Rate: 105 Resp: 20 SpO2: 96 % O2 Device: Nasal cannula Oxygen Delivery: 4 LPM[EW1.2]  Vital Signs with Ranges[EW1.1]  Temp:  [97  F (36.1  C)-100.7  F (38.2  C)] 100.7  F (38.2  C)  Heart Rate:  [] 105  Resp:  [20-22] 20  BP: ()/(48-83) 115/48  SpO2:  [90 %-96 %] 96 %  131 lbs 11.2 oz[EW1.2]    Constitutional: awake, confused  GI:  soft, non-distended, mild diffusely tender, no masses palpated  Musculoskeletal: no pedal edema[EW1.1]    Data   Results for orders placed or performed during the hospital encounter of 03/02/18 (from the past 24 hour(s))   CBC with platelets differential   Result Value Ref Range    WBC 0.3 (LL) 4.0 - 11.0 10e9/L    RBC Count 2.45 (L) 3.8 - 5.2 10e12/L    Hemoglobin 7.0 (L) 11.7 - 15.7 g/dL    Hematocrit 21.6 (L) 35.0 - 47.0 %    MCV 88 78 - 100 fl    MCH 28.6 26.5 - 33.0 pg    MCHC 32.4 31.5 - 36.5 g/dL    RDW 15.0 10.0 - 15.0 %    Platelet Count 37 (LL) 150 - 450 10e9/L    Diff Method WBC <0.5, Diff not done    Comprehensive metabolic panel   Result Value Ref Range    Sodium 136 133 - 144 mmol/L    Potassium 4.4 3.4 - 5.3 mmol/L    Chloride 101 94 - 109 mmol/L    Carbon Dioxide 26 20 - 32 mmol/L    Anion Gap 9 3 - 14 mmol/L    Glucose 74 70 - 99 mg/dL    Urea Nitrogen 86 (H) 7 - 30 mg/dL    Creatinine 2.67 (H) 0.52 - 1.04 mg/dL    GFR Estimate 17 (L) >60 mL/min/1.7m2    GFR Estimate If Black 20 (L) >60 mL/min/1.7m2    Calcium 8.7 8.5 - 10.1 mg/dL    Bilirubin Total 1.0 0.2 - 1.3 mg/dL    Albumin 1.7 (L) 3.4 - 5.0 g/dL    Protein Total 5.8 (L) 6.8 - 8.8 g/dL    Alkaline Phosphatase 152 (H) 40 - 150 U/L      (H) 0 - 50 U/L     (H) 0 - 45 U/L   Nt probnp inpatient   Result Value Ref Range    N-Terminal Pro BNP Inpatient 87851 (H) 0 - 1800 pg/mL   Lipase   Result Value Ref Range    Lipase 26 (L) 73 - 393 U/L   Troponin I   Result Value Ref Range    Troponin I ES <0.015 0.000 - 0.045 ug/L   ISTAT gases lactate reynaldo POCT   Result Value Ref Range    Ph Venous 7.34 7.32 - 7.43 pH    PCO2 Venous 47 40 - 50 mm Hg    PO2 Venous 43 25 - 47 mm Hg    Bicarbonate Venous 25 21 - 28 mmol/L    O2 Sat Venous 76 %    Lactic Acid 1.1 0.7 - 2.1 mmol/L   ISTAT Basic Met ICa HCT POCT   Result Value Ref Range    Sodium 136 133 - 144 mmol/L    Potassium 4.5 3.4 - 5.3 mmol/L    Chloride 99 94 - 109 mmol/L    Total CO2 27 20 - 32 mmol/L    Anion Gap 10 6 - 17 mmol/L    Glucose 75 70 - 99 mg/dL    Urea Nitrogen 75 (H) 7 - 30 mg/dL    Creatinine 3.4 (H) 0.52 - 1.04 mg/dL    GFR Estimate 13 (L) >60 mL/min/1.7m2    GFR Estimate If Black 15 (L) >60 mL/min/1.7m2    Calcium Ionized 4.4 4.4 - 5.2 mg/dL    Hemoglobin 18.7 (H) 11.7 - 15.7 g/dL    Hematocrit - POCT 55 (H) 35.0 - 47.0 %PCV   Blood culture   Result Value Ref Range    Specimen Description Blood Left Arm     Special Requests Aerobic and anaerobic bottles received     Culture Micro No growth after 6 hours    POC US ABDOMEN LIMITED    Impression    PROCEDURE NOTE --> Emergency Bedside Ultrasound    Procedure Name: Modified RUSH exam    Preformed by: Wilber Qureshi MD    Indication - Hypotension    Probe: low frequency curvilinear probe    Windows - Hepatorenal, Splenorenal, Suprapubic, Subxyphoid, Abdominal Aortic Views, IVC, bilateral lung windows    Findings - No intraperitoneal free fluid, No pericardial effusion, No Pneumothorax, No Abdominal aortic aneurysm, <18mm, >30% collapsibility IVC, grossly normal contractility,     Impression  -hypovolemia preserved contractility no sign of obstructive shock    Images saved on ultrasound internal hard drive per protocol   XR Chest Port 1 View     Narrative    PORTABLE CHEST ONE VIEW   3/2/2018  9:36 PM     HISTORY: Fever.    COMPARISON: 2/17/2018 chest x-ray.      Impression    IMPRESSION: Moderate stable cardiomegaly. Mildly prominent central  pulmonary vasculature. Increased density in the perihilar regions  bilaterally and left lower lobe. These opacities could be caused by  congestive heart failure. Cannot exclude pneumonia in the right  perihilar region or left lower lobe.    Advanced bilateral shoulder degenerative changes.    ANURADHA SERRANO MD   Blood culture   Result Value Ref Range    Specimen Description Blood Right Arm     Special Requests Aerobic and anaerobic bottles received     Culture Micro No growth after 6 hours    Abd/pelvis CT no contrast - Stone Protocol    Narrative    CT ABDOMEN AND PELVIS WITHOUT CONTRAST   3/2/2018 10:37 PM     HISTORY: Abdominal pain, fever, altered mental status.    TECHNIQUE: No IV contrast material. Radiation dose for this scan was  reduced using automated exposure control, adjustment of the mA and/or  kV according to patient size, or iterative reconstruction technique.    COMPARISON: CT scan from 6/15/2012.    FINDINGS: Scans through the lung bases demonstrate bibasilar  consolidation suspicious for pneumonia. I suspect there is bilateral  hilar adenopathy as well.    The noncontrast appearance of the liver is normal. There appears to be  a peripherally calcified porcelain type gallbladder with variable  density within it likely sludge or cholesterol gallstones. Polyps not  excluded. The low-density areas within the gallbladder are more  prominent than on the comparison study. Spleen is atrophic. Pancreas  is atrophic but otherwise unremarkable. No adrenal lesions. No kidney  stones or hydronephrosis. No contour deforming renal masses. There is  likely a left mid pole renal cortical cyst    No evidence of abdominal aortic aneurysm or retroperitoneal  adenopathy.    Scans through the pelvis demonstrate calcified  uterine fibroid.  Bladder has a normal appearance. No evidence of diverticulitis or  appendicitis. No evidence of bowel obstruction. No free air or free  fluid.      Impression    IMPRESSION:  1. Bibasilar consolidation suspicious for pneumonia. There is likely  bilateral hilar adenopathy as well seen on the images that include a  portion of the lower chest.  2. There is a peripherally calcified porcelain type gallbladder with  mixed density material within it. Two low density round areas are more  prominent than on the comparison study and could represent cholesterol  gallstones. A polyp of some sort would be difficult to exclude. On  this study, there are two low-density areas in the gallbladder and  previously there was one.  3. No evidence of diverticulitis or appendicitis.    ANURADHA SERRANO MD   Head CT w/o contrast    Narrative    CT SCAN OF THE HEAD WITHOUT CONTRAST   3/2/2018 10:38 PM     HISTORY: Agitation, acute altered mental status.      TECHNIQUE:  Axial images of the head and coronal reformations without  IV contrast material. Radiation dose for this scan was reduced using  automated exposure control, adjustment of the mA and/or kV according  to patient size, or iterative reconstruction technique.    COMPARISON: 2/16/2018.    FINDINGS: There is no evidence of intracranial hemorrhage, mass, acute  infarct or anomaly. There is generalized atrophy of the brain. There  is low attenuation in the white matter of the cerebral hemispheres  consistent with sequelae of small vessel ischemic disease.     The visualized portions of the sinuses and mastoids appear normal. The  bony calvarium and bones of the skull base appear intact. Bilateral  pseudophakia.      Impression    IMPRESSION:     1. No evidence of acute intracranial hemorrhage, mass, or herniation.  2. There is generalized atrophy of the brain. White matter changes are  present in the cerebral hemispheres that are consistent with small  vessel ischemic  disease in this age patient.      BETHANY HUANG MD   EKG 12-lead, tracing only   Result Value Ref Range    Interpretation ECG Click View Image link to view waveform and result    UA with Microscopic   Result Value Ref Range    Color Urine Nai     Appearance Urine Cloudy     Glucose Urine Negative NEG^Negative mg/dL    Bilirubin Urine Negative NEG^Negative    Ketones Urine Negative NEG^Negative mg/dL    Specific Gravity Urine 1.018 1.003 - 1.035    Blood Urine Negative NEG^Negative    pH Urine 5.0 5.0 - 7.0 pH    Protein Albumin Urine Negative NEG^Negative mg/dL    Urobilinogen mg/dL 4.0 (H) 0.0 - 2.0 mg/dL    Nitrite Urine Negative NEG^Negative    Leukocyte Esterase Urine Negative NEG^Negative    Source Catheterized Urine     WBC Urine 3 0 - 5 /HPF    RBC Urine 2 0 - 2 /HPF    Bacteria Urine Few (A) NEG^Negative /HPF    Squamous Epithelial /HPF Urine 1 0 - 1 /HPF    Mucous Urine Present (A) NEG^Negative /LPF    Hyaline Casts 5 (H) 0 - 2 /LPF    Amorphous Crystals Moderate (A) NEG^Negative /HPF   Urine Culture Aerobic Bacterial   Result Value Ref Range    Specimen Description Catheterized Urine     Special Requests Specimen received in preservative     Culture Micro PENDING    Glucose by meter   Result Value Ref Range    Glucose 75 70 - 99 mg/dL   Glucose by meter   Result Value Ref Range    Glucose 100 (H) 70 - 99 mg/dL   CBC with platelets differential   Result Value Ref Range    WBC 0.5 (LL) 4.0 - 11.0 10e9/L    RBC Count 2.42 (L) 3.8 - 5.2 10e12/L    Hemoglobin 7.0 (L) 11.7 - 15.7 g/dL    Hematocrit 21.3 (L) 35.0 - 47.0 %    MCV 88 78 - 100 fl    MCH 28.9 26.5 - 33.0 pg    MCHC 32.9 31.5 - 36.5 g/dL    RDW 15.1 (H) 10.0 - 15.0 %    Platelet Count 23 (LL) 150 - 450 10e9/L    Diff Method Manual Differential     % Neutrophils 57.0 %    % Lymphocytes 27.0 %    % Monocytes 10.0 %    % Eosinophils 6.0 %    % Basophils 0.0 %    Absolute Neutrophil 0.3 (LL) 1.6 - 8.3 10e9/L    Absolute Lymphocytes 0.1 (L) 0.8 - 5.3  10e9/L    Absolute Monocytes 0.1 0.0 - 1.3 10e9/L    Absolute Eosinophils 0.0 0.0 - 0.7 10e9/L    Absolute Basophils 0.0 0.0 - 0.2 10e9/L    RBC Morphology Consistent with reported results     Platelet Estimate       Automated count confirmed.  Platelet morphology is normal.   Basic metabolic panel   Result Value Ref Range    Sodium 138 133 - 144 mmol/L    Potassium 4.0 3.4 - 5.3 mmol/L    Chloride 106 94 - 109 mmol/L    Carbon Dioxide 25 20 - 32 mmol/L    Anion Gap 7 3 - 14 mmol/L    Glucose 103 (H) 70 - 99 mg/dL    Urea Nitrogen 87 (H) 7 - 30 mg/dL    Creatinine 2.35 (H) 0.52 - 1.04 mg/dL    GFR Estimate 19 (L) >60 mL/min/1.7m2    GFR Estimate If Black 24 (L) >60 mL/min/1.7m2    Calcium 8.3 (L) 8.5 - 10.1 mg/dL   CBC with platelets differential   Result Value Ref Range    WBC 0.5 (LL) 4.0 - 11.0 10e9/L    RBC Count 2.33 (L) 3.8 - 5.2 10e12/L    Hemoglobin 6.6 (LL) 11.7 - 15.7 g/dL    Hematocrit 20.5 (L) 35.0 - 47.0 %    MCV 88 78 - 100 fl    MCH 28.3 26.5 - 33.0 pg    MCHC 32.2 31.5 - 36.5 g/dL    RDW 15.1 (H) 10.0 - 15.0 %    Platelet Count 31 (LL) 150 - 450 10e9/L    Diff Method Manual Differential     % Neutrophils 55.0 %    % Lymphocytes 27.0 %    % Monocytes 2.0 %    % Eosinophils 1.0 %    % Basophils 0.0 %    % Metamyelocytes 14.0 %    % Myelocytes 1.0 %    Absolute Neutrophil 0.3 (LL) 1.6 - 8.3 10e9/L    Absolute Lymphocytes 0.1 (L) 0.8 - 5.3 10e9/L    Absolute Monocytes 0.0 0.0 - 1.3 10e9/L    Absolute Eosinophils 0.0 0.0 - 0.7 10e9/L    Absolute Basophils 0.0 0.0 - 0.2 10e9/L    Absolute Metamyelocytes 0.1 (H) 0 10e9/L    Absolute Myelocytes 0.0 0 10e9/L    Anisocytosis Slight     Microcytes Present     Macrocytes Present     Dohle Bodies Present     Toxic Granulation Present     Platelet Estimate       Automated count confirmed.  Giant platelets are present.[EW1.2]                Revision History        User Key Date/Time User Provider Type Action    > EW1.2 3/3/2018 12:31 PM Gopi Gagnon MD Physician  "Sign     EW1.1 3/3/2018 12:14 PM Gopi Gagnon MD Physician                      Progress Notes - Physician (Notes from 03/03/18 through 03/06/18)      Progress Notes by Diamante Millan APRN CNS at 3/6/2018 10:18 AM     Author:  Diamante Millan APRN CNS Service:  Palliative Author Type:  Clinical Nurse Specialist    Filed:  3/6/2018 11:48 AM Date of Service:  3/6/2018 10:18 AM Creation Time:  3/6/2018 10:18 AM    Status:  Addendum :  Diamante Millan APRN CNS (Clinical Nurse Specialist)         [facility]  Palliative Care Progress Note  Text Page     Assessment & Plan   Josy Thomson is a 89 year old female who was admitted on 3/2/2018. I was asked to see the patient for goals of care.     Recommendations:  1. Pain - tylenol 500 mg PO or 650 mg TN q 4 hours prn mild pain or fever. Voltaren 1% gel to upper arms and shoulders QID. Hydromorphone 2 mg SL q 4 hours and[JW1.1] 2-4 mg SL[JW1.2] q 2 hours prn pain or dyspnea with respiratory rate greater than 20. Hydromorphone 0.3-0.5 mg IV q 2 hours prn pain if sublingual prn doses ineffective. Position for comfort. Pt has increased pain with lifting, moving of arms and shoulders.  2. Dyspnea-  See hydromorphone in #1. o2 at 2-4 l/min prn dyspnea. Fan at bedside. Lorazepam 0.5-1 mg SL q 3 hours prn. Atropine 1% 1-2 gtts q 1 hour prn secretions. Albuterol neb q 4 hours prn.  3. Delirium - Haldol 0.5-1 mg SL q 6 hours prn agitation. Nursing delirium interventions as appropriate.     Goal of Care: DNR DNI. No feeding tube. Comfort Care. Pt does not display medical decision-making capacity. Dtr Jazmine is [facility]. Dtr Karla also present. They verbalize that pt would not want to have life prolonging care and would like cares that promote pt comfort. \"We don't want her to have artificial treatments, just allow the natural process and keep her comfortable\".  Appreciate MELISSA[JW1.1] Corinne[JW1.2] assistance to coordinate meeting with  hospice this " "am[JW1.1]. Pt will discharge to [facility] with  hospice at 4:30pm today. POLST completed. Comfort meds ordered.[JW1.2]  Appreciate  Phan visits to pt.     Disease Process/es & Symptoms:  Josy Thomson is a 89 year old patient admitted with symptoms of progressive confusion and agitation. She has been treated for pneumonia, recent influenza, encephalopathy, MDS with anemia, neutropenia, and thrombocytopenia, acute kidney injury .       This is in the setting of MDS dx 10/2015 receiving bimonthly transfusions of prbcs, epo but no other therapy due to performance status, HTN, arthritis, recent influenza, CKD, chronic pain, osteoarthritis, depression, GIB, anemia due to vit B12 deficiency.  She has been hospitalized 2 times in the past 1 month for recurrent confusion, altered mental status . Patient has moved to inpatient care from TCU for higher level of care and needs help with at least one ADL. There is a documented unitentional weight loss of 7 pounds over the past 12 months.     The following symptoms are noted by patient's daughters  as concerning to her quality of life.  Chronic pain  Dyspnea  Confusion  Deconditioning and loss of independence     Psychosocial/Spiritual Needs:  Pt is Nondenominational and Quaker  Oriented to Spiritual Health and Social Work Services as part of Palliative Care team.  is following. Consultation placed for  to follow.     Decision-Making & Goals of Care:  Discussion/counseling today about goals of care/decisions:[JW1.1]   3/6/2018[JW1.3]  Met with Bob at pt's bedside. Introduced them to the hospice team this am. Will complete POLST.  3/5/2018  Met at pt's bedside with anna Lucero and anna Acosta. They share that pt has been a very independent, strong person and has been able to live in her home for over 56 years up to her TCU admission after increased falling. \"Those falls really take it out of a person\".   They note that pt verbalized to a  at " "one point that she misses being able to sit in the sun, and was resentful to not be independent in her living any more. She \" was angry at me [daughter] for bringing her to the hospital earlier this month . . . She felt betrayed\". Jazmine and Karla verbalized that they have had the chance to speak with Dr. Morrissey and her partner, and the hospitalist about pt's condition. They understand that pt had influenza, and now has PNA, PAXTON in addition to her MDS with dependence on blood transfusions, and progressive decline in her mental status despite treatment. Jazmine states \"Mom never wanted to have all of these artificial things done to her . . . We want her to be kept comfortable\". We discussed artificial hydration. Pt is congested at present, in PAXTON/CKD with increasing swelling in her hands. It is likely that additional IV fluids would increase her respiratory congestion, swelling and prolong her dying process. Frequent mouth care will help with feeling of thirst and if pt is awake, pt can eat or drink. Pt's often time lose the feeling of thirst or hunger as their bodies prepare for death. Jazmine and Karla agree with this. Dr. Olivier joined us. He recommended to stop antibiotics as well, as they will not likely provide any benefit to pt. Jazmine and Karla share with him that they want care to be focused on comfort. We agreed to [facility] consult, meeting with hospice. Described hospice philosophy, goal of care, processes. Also described options for where care is received. Jazmine does not feel that the family can care for pt with hospice at home. She would like to explore LTC or hospice home. She also would like to meet with a different hospice than the one they have met with previously.  We will make pt comfort care. I will order medications and treatment that focus on pt comfort. SWS Corinne is present and continued meeting with daughters to further explore hospice and discharge facility. Will also consult  for support and " "prayer at pt bedside.     Patient has decision-making capacity Unreliable  Patient has a Health Care Agent named in a legal Advance Directive document dated 12/26/2014. See name(s) and contact information in Health Care Agent/Legal Guardian section below.  Name: Jazmine Diaz, Relationship: dtr  See contact information in ACP tab in Pt medical record.     Patient has a completed health care directive available in the chart (Y/N): Y  There is no POLST (Physician orders for life-sustaining treatment) form on file for this patient  Code Status:                     Do not resuscitate / Do not intubate      Findings & plan of care discussed with: Dr. Olivier, MELISSA Cano, Chaplain Chisholm, bedside nurses  Follow-up plan from palliative team: Will continue to follow this pt for symptom management and support for pt and family with decision-making.   Thank you for involving us in the patient's care.        Diamante PHIPPS, CNS  Pain Management and Palliative Care  [facility]  Pgr: [phone]    Time Spent on this Encounter   I spent  20 minutes in assessment of the patient and discussion with the patient and family. Another 15 minutes in review of chart, documentation and discussion with the health care team.    Interval History   Reviewed chart. More alert, agitated this am. Interacting some with her daughters, but is confused and can't \"see\" them. Breathing slightly more labored, but less audible RH.[JW1.1]     Review of Systems[JW1.4]  /Palliative Symptom Review   GRIS due to pt LOC.    Physical Exam   Temp:  [96.8  F (36  C)] 96.8  F (36  C)  Pulse:  [111] 111  Heart Rate:  [111] 111  Resp:  [20] 20  BP: (132)/(78) 132/78  SpO2:  [92 %] 92 %  131 lbs 11.2 oz     GEN:  Drowsy, weak, GRIS orientation, appears to recognize dtrs, appears sl agitated.  HEENT:  Normocephalic/atraumatic, no scleral icterus, no nasal discharge, mouth dry.   CV:  Tachy, S1, S2, S4;  +3 DP/PT pulses bilatererally; no edema BLE. +1 " Edema BUEs.  RESP:  bilat RA with exp wheezes, and prolonged expiration.  Symmetric chest rise on inhalation noted.  Labored breathing. O2 at 2l/min NC.  ABD:  Rounded, soft, non-tender/non-distended.  +BS  EXT:  Edema & pulses as noted above.  CMS intact x 4.     M/S:   Bilateral arm and shoulder pain with any movement of her arms, knee pain from osteoarthritis - deferred extensive palpation.    SKIN:  Dry to touch, no exanthems noted in the visualized areas.  Farmer draining concentrated keke urine.  NEURO: MOLINA. Spontaneous movement but does not follow commands  Psych:  Drowsy, sl agitated affect.  Non-verbal. Keeps eyes closed unless asked to open them.    Medications       HYDROmorphone  2-4 mg Sublingual Q4H     diclofenac  2 g Topical 4x Daily     haloperidol lactate  2 mg Intravenous Once     Carboxymethylcellulose Sod PF  1 drop Both Eyes TID       Data   Results for orders placed or performed during the hospital encounter of 03/02/18 (from the past 24 hour(s))   Social Work IP Consult    Narrative    White, Corinne C, LORAINE     3/5/2018  1:03 PM  Care Transition Initial Assessment -   Reason For Consult: care coordination/care conference, community   resources, discharge planning  Met with: Patient and Family    Active Problems:    HCAP (healthcare-associated pneumonia)       DATA  Lives With: alone  Living Arrangements: house  Description of Support System: Supportive, Involved  Who is your support system?: Children, Facility resident(s)/Staff  Support Assessment: Lacks necessary supervision and assistance,   Lacks adequate physical care, Caregiver experiencing high stress,   Caregiver difficulty providing physical care/safety.   Identified issues/concerns regarding health management: Pt   admitted from TCU and now moving to comfort care with Hospice       Resources List: Skilled Nursing Facility, came from [facility],. Would   like to return if bed available    Hospice- set up meeting with Hospice for tomorrow  @10 am         Transportation Available: will need stretcher transport   ASSESSMENT  Pt is not alert. Met with pt's daughters. They are not   comfortable with taking pt home for Hospice support. They are   willing to have pt return to NH level of care with support of   Hospice. They met with a provider at NH facility but not   comfortable with that program   Set up meeting for tomorrow with [facility]      PLAN  Financial costs for the patient includes $5000 deposit for   Placement with Hospice support  .  Patient given options and choices for discharge yes .  Will follow up with family following Hospice meeting.  Will need   stretcher transport[JW1.1]           Revision History        User Key Date/Time User Provider Type Action    > JW1.2 3/6/2018 11:48 AM Diamante Millan APRN CNS Clinical Nurse Specialist Addend     JW1.3 3/6/2018 10:31 AM Diamante Millan APRN CNS Clinical Nurse Specialist Sign     JW1.4 3/6/2018 10:23 AM Diamante Millan APRN CNS Clinical Nurse Specialist      JW1.1 3/6/2018 10:18 AM Diamante Millan APRN CNS Clinical Nurse Specialist             Progress Notes by White, Corinne C, LSW at 3/6/2018 10:34 AM     Author:  White, Corinne C, LSW Service:  (none) Author Type:      Filed:  3/6/2018 11:12 AM Date of Service:  3/6/2018 10:34 AM Creation Time:  3/6/2018 10:34 AM    Status:  Addendum :  White, Corinne C, LSW ()         Discharge Planner   Discharge Plans in progress: Family meeting with Hospice to sign consent   Barriers to discharge plan: none. Will need stretcher transport   Follow up plan:[CW1.1] [facility] to [facility]:M[CW1.2]  HE stretcher set up for 1630       Entered by: Corinne C. White 03/06/2018 10:34 AM[CW1.1]          Revision History        User Key Date/Time User Provider Type Action    > CW1.2 3/6/2018 11:12 AM White, Corinne C, LSW  Addend     CW1.1 3/6/2018 10:35 AM White, Corinne C, LSW  Sign             Progress Notes by Katelyn Morrissey MD at 3/6/2018  9:35 AM     Author:  Katelyn Morrissey MD Service:  Oncology Author Type:  Physician    Filed:  3/6/2018  9:38 AM Date of Service:  3/6/2018  9:35 AM Creation Time:  3/6/2018  9:35 AM    Status:  Signed :  Katelyn Morrissey MD (Physician)         Oncology/Hematology Follow Up Note:    Assessment and Plan:    Josy Thomson is a 89 year old female who was admitted on 3/2/2018.           Myelodysplasia  -followed by me.  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     PLAN:  I d/w daughter via phone and had a long chat with her, about goals of care.  - She is very open, to palliative care.  - She wants comfort care for her mom.  - Noted change in plan.      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  -continue desferal      ID  -healthcare associated pneumonia  -agree with broad spectrum antibiotics      Renal Failure  -on IV fluids  -appreciate hospitalist care      Encephalopathy  -monitor with supportive care      Liver Function Test Elevations  -iron over load and infection  may be contributing  -CT abdomen noted  -monitor              Code Status: DNR/DNI and comfort cares  Subjective:    Asleep,did not arouse      Labs:  All labs reviewed    CBC  Recent Labs  Lab 03/05/18  0623 03/04/18  0652 03/03/18  1001   WBC 0.8* 0.6* 0.5*   HGB  7.2* 7.7* 6.6*   MCV 88 86 88   PLT 16* 18* 31*       CMP  Recent Labs  Lab 03/05/18  0623 03/04/18  0652 03/03/18  0707 03/02/18  2114 03/02/18  2101   * 145* 138 136 136   POTASSIUM 3.6 3.6 4.0 4.5 4.4   CHLORIDE 118* 113* 106 99 101   CO2 23 23 25  --  26   ANIONGAP 7 9 7 10 9   * 133* 103* 75 74   BUN 88* 86* 87* 75* 86*   CR 1.86* 2.03* 2.35*  --  2.67*   GFRESTIMATED 25* 23* 19* 13* 17*   GFRESTBLACK 31* 28* 24* 15* 20*   TYREE 8.6 8.2* 8.3*  --  8.7   PROTTOTAL  --   --   --   --  5.8*   ALBUMIN  --   --   --   --  1.7*   BILITOTAL  --   --   --   --  1.0   ALKPHOS  --   --   --   --  152*   AST  --   --   --   --  258*   ALT  --   --   --   --  349*       INRNo lab results found in last 7 days.    Blood Culture  Recent Labs  Lab 03/02/18  2350 03/02/18  2218 03/02/18  2127   CULT <1000 colonies/mLurogenital carolina No growth after 3 days No growth after 3 days         Katelyn Morrissey MD  [facility]  3/6/2018 9:35 AM[AS1.1]           Revision History        User Key Date/Time User Provider Type Action    > AS1.1 3/6/2018  9:38 AM Katelyn Morrissey MD Physician Sign            Progress Notes by Phan Chadwick at 3/5/2018  1:19 PM     Author:  Phan Chadwick Service:  Spiritual Health Author Type:      Filed:  3/5/2018  1:27 PM Date of Service:  3/5/2018  1:19 PM Creation Time:  3/5/2018  1:19 PM    Status:  Signed :  Phan Chadwick ()         SPIRITUAL HEALTH SERVICES Progress Note  [facility] Med/Surg 3rd floor    SH consult per palliative team request. Chart review indicates that patient, Josy, has transitioned to comfort cares and has been lethargic and confused recently.   During my visit, Josy, did not open her eyes, and would occasionally mumble, but it was unintelligable. Review of previous  notes indicates that pt's dtr, Jazmine, has an overt Restorationist spirituality and that Josy tends to hold her Restorationist domitila more privately.    With this in mind, and being unable to  communicate with Josy directly during this visit, simply sat with Josy and provided compassionate presence and offered silent prayers.    Family to meet with hospice tomorrow at 10 a.m. Will attempt to f/u with pt's daughters at that time to assess their emotional/spiritual needs as well and discuss what else Josy may find comforting at this time.  I and the other chaplains remain available per pt/family/staff need or request.     LARS Burrell  Staff    Pager #[phone][DM1.1]        Revision History        User Key Date/Time User Provider Type Action    > DM1.1 3/5/2018  1:27 PM Phan Chadwick Sign            Progress Notes by Jerica Olivier MD at 3/5/2018 11:23 AM     Author:  Jerica Olivier MD Service:  Hospitalist Author Type:  Physician    Filed:  3/5/2018 11:25 AM Date of Service:  3/5/2018 11:23 AM Creation Time:  3/5/2018 11:23 AM    Status:  Signed :  Jerica Olivier MD (Physician)                         [facility]  Hospitalist Progress Note  Name: Josy Thomson    MRN: [id number]  YOB: 1927    Age: 89 year old  Date of admission: 3/2/2018  Primary care provider: Abeba Bradford        Reason for Stay (Diagnosis): Healthcare associated pneumonia/neutropenic fever          Assessment and Plan:      Summary of Stay:  Josy Thomson is a 89 year old female patient with past medical history of myelodysplastic disorder, anemia, hypertension, arthritis, recent influenza infection, was brought from transitional care unit for evaluation for change in mental status.  History was obtained from patient's daughters.  Patient was febrile and tachycardic.  Laboratory workup showed WBC 0.3, creatinine 3.4.  CT of the abdomen/pelvis showed bibasilar consolidation suspicious for pneumonia.  She was given IV vancomycin and cefepime.  She was admitted to [facility] for further management.  Okay to transfer to [facility] on 3/4/18.      1.  Healthcare associated  "pneumonia/neutropenic fever.  --Met with both daughters in addition with palliative care.  Plan now is to transitioned to comfort measures so we will discontinue vancomycin and cefepime.      2.  Acute toxic/infectious encephalopathy secondary to above:  Focus now only on comfort cares.     3.  Pancytopenia with history of myelodysplastic disorder: Oncology input appreciated.  Guarded to poor prognosis.  No further transfusions for CBC checks.    4.  Acute kidney injury, likely due to decreased intake, sepsis:  C no further workup.  Will discontinue IV fluids.    5.  Palliative care assistance appreciated.     DVT Prophylaxis: Pneumatic Compression Devices  Code Status: DNR / DNI  Discharge Dispo:   consultation.  Per daughter's, will likely need long-term care or residential hospice with hospice support at discharge.  Estimated Disch Date / # of Days until Disch:  In 1-2 days depending on patient's progress on comfort measures.  Time spent: Greater than 35 minutes.  Plan of care discussed with daughters.      Interval History (Subjective):      Limited historian secondary to lethargy and confusion.            Physical Exam:      Vital signs:  Temp: 96.2  F (35.7  C) Temp src: Axillary BP: 97/45   Heart Rate: 98 Resp: 20 SpO2: 96 % O2 Device: Nasal cannula Oxygen Delivery: 4 LPM   Weight: 59.7 kg (131 lb 11.2 oz)  Estimated body mass index is 26.6 kg/(m^2) as calculated from the following:    Height as of 2/18/18: 1.499 m (4' 11\").    Weight as of this encounter: 59.7 kg (131 lb 11.2 oz).     # Pain Assessment:   Current Pain Score 3/3/2018 3/3/2018 3/3/2018   Patient currently in pain? GRIS denies yes   Pain score (0-10) - - -   Pain location - - Arm   Pain descriptors - - Aching         I/O last 3 completed shifts:  In: -   Out: 300 [Urine:300]       Vitals:     03/02/18 2331 03/03/18 0128   Weight: 52 kg (114 lb 10.2 oz) 59.7 kg (131 lb 11.2 oz)         Constitutional:  Lethargic and somnolent.  " Otherwise appear comfortable and in no apparent distress   Respiratory: Nl work of breathing.  Crackles right lung base   Cardiovascular: Regular rate and rhythm, normal S1 and S2, and no murmur noted   Abdomen: Normal bowel sounds, soft, non-distended, non-tender   Skin: No rashes, no cyanosis, dry to touch   Neuro: CN 2-12 intact, mobilizes all 4 extremities and responds to pain stimuli   Extremities: No edema, normal range of motion   HEENT Normocephalic, atraumatic, normal nasal turbinates; oropharynx clear   Neck Supple; nl inspection; trachea midline; no thryomegaly   Psychiatric:  Unable           Medications:      All current medications were reviewed with changes reflected in problem list.          Data:      All new lab and imaging data was reviewed.   Labs:     Recent Labs  Lab 03/02/18  2350 03/02/18 2218 03/02/18 2127 02/26/18  0546   CULT PENDING No growth after 15 hours No growth after 15 hours 50,000 to 100,000 colonies/mLmixed urogenital floraSusceptibility testing not routinely done         Recent Labs  Lab 03/03/18  1001 03/03/18  0707 03/02/18 2114 03/02/18  2101   WBC 0.5* 0.5*  --  0.3*   HGB 6.6* 7.0* 18.7* 7.0*   HCT 20.5* 21.3*  --  21.6*   MCV 88 88  --  88   PLT 31* 23*  --  37*         Recent Labs  Lab 03/03/18  0707 03/02/18 2114 03/02/18 2101 03/02/18  0702    136 136 136   POTASSIUM 4.0 4.5 4.4 4.6   CHLORIDE 106 99 101 100   CO2 25  --  26 26   ANIONGAP 7 10 9 10   * 75 74 95   BUN 87* 75* 86* 65*   CR 2.35*  --  2.67* 2.45*   GFRESTIMATED 19* 13* 17* 19*   GFRESTBLACK 24* 15* 20* 22*   TYREE 8.3*  --  8.7 8.7   PROTTOTAL  --   --  5.8*  --    ALBUMIN  --   --  1.7*  --    BILITOTAL  --   --  1.0  --    ALKPHOS  --   --  152*  --    AST  --   --  258*  --    ALT  --   --  349*  --          Recent Labs  Lab 03/02/18  2350   COLOR Nai   APPEARANCE Cloudy   URINEGLC Negative   URINEBILI Negative   URINEKETONE Negative   SG 1.018   UBLD Negative   URINEPH 5.0   PROTEIN  Negative   NITRITE Negative   LEUKEST Negative   RBCU 2   WBCU 3      Imaging:       Recent Results (from the past 24 hour(s))   POC US ABDOMEN LIMITED     Impression     PROCEDURE NOTE --> Emergency Bedside Ultrasound     Procedure Name: Modified RUSH exam     Preformed by: Wilber Qureshi MD     Indication - Hypotension     Probe: low frequency curvilinear probe     Windows - Hepatorenal, Splenorenal, Suprapubic, Subxyphoid, Abdominal Aortic Views, IVC, bilateral lung windows     Findings - No intraperitoneal free fluid, No pericardial effusion, No Pneumothorax, No Abdominal aortic aneurysm, <18mm, >30% collapsibility IVC, grossly normal contractility,      Impression  -hypovolemia preserved contractility no sign of obstructive shock     Images saved on ultrasound internal hard drive per protocol   XR Chest Port 1 View     Narrative     PORTABLE CHEST ONE VIEW   3/2/2018  9:36 PM      HISTORY: Fever.     COMPARISON: 2/17/2018 chest x-ray.     Impression     IMPRESSION: Moderate stable cardiomegaly. Mildly prominent central  pulmonary vasculature. Increased density in the perihilar regions  bilaterally and left lower lobe. These opacities could be caused by  congestive heart failure. Cannot exclude pneumonia in the right  perihilar region or left lower lobe.     Advanced bilateral shoulder degenerative changes.     ANURADHA SERRANO MD   Abd/pelvis CT no contrast - Stone Protocol     Narrative     CT ABDOMEN AND PELVIS WITHOUT CONTRAST   3/2/2018 10:37 PM      HISTORY: Abdominal pain, fever, altered mental status.     TECHNIQUE: No IV contrast material. Radiation dose for this scan was  reduced using automated exposure control, adjustment of the mA and/or  kV according to patient size, or iterative reconstruction technique.     COMPARISON: CT scan from 6/15/2012.     FINDINGS: Scans through the lung bases demonstrate bibasilar  consolidation suspicious for pneumonia. I suspect there is bilateral  hilar adenopathy as  well.     The noncontrast appearance of the liver is normal. There appears to be  a peripherally calcified porcelain type gallbladder with variable  density within it likely sludge or cholesterol gallstones. Polyps not  excluded. The low-density areas within the gallbladder are more  prominent than on the comparison study. Spleen is atrophic. Pancreas  is atrophic but otherwise unremarkable. No adrenal lesions. No kidney  stones or hydronephrosis. No contour deforming renal masses. There is  likely a left mid pole renal cortical cyst     No evidence of abdominal aortic aneurysm or retroperitoneal  adenopathy.     Scans through the pelvis demonstrate calcified uterine fibroid.  Bladder has a normal appearance. No evidence of diverticulitis or  appendicitis. No evidence of bowel obstruction. No free air or free  fluid.     Impression     IMPRESSION:  1. Bibasilar consolidation suspicious for pneumonia. There is likely  bilateral hilar adenopathy as well seen on the images that include a  portion of the lower chest.  2. There is a peripherally calcified porcelain type gallbladder with  mixed density material within it. Two low density round areas are more  prominent than on the comparison study and could represent cholesterol  gallstones. A polyp of some sort would be difficult to exclude. On  this study, there are two low-density areas in the gallbladder and  previously there was one.  3. No evidence of diverticulitis or appendicitis.     ANURADHA SERRANO MD   Head CT w/o contrast     Narrative     CT SCAN OF THE HEAD WITHOUT CONTRAST   3/2/2018 10:38 PM      HISTORY: Agitation, acute altered mental status.       TECHNIQUE:  Axial images of the head and coronal reformations without  IV contrast material. Radiation dose for this scan was reduced using  automated exposure control, adjustment of the mA and/or kV according  to patient size, or iterative reconstruction technique.     COMPARISON: 2/16/2018.     FINDINGS: There  is no evidence of intracranial hemorrhage, mass, acute  infarct or anomaly. There is generalized atrophy of the brain. There  is low attenuation in the white matter of the cerebral hemispheres  consistent with sequelae of small vessel ischemic disease.      The visualized portions of the sinuses and mastoids appear normal. The  bony calvarium and bones of the skull base appear intact. Bilateral  pseudophakia.     Impression     IMPRESSION:     1. No evidence of acute intracranial hemorrhage, mass, or herniation.  2. There is generalized atrophy of the brain. White matter changes are  present in the cerebral hemispheres that are consistent with small  vessel ischemic disease in this age patient.        MD Jerica MALIK -[phone]                [DH1.1]          Revision History        User Key Date/Time User Provider Type Action    > DH1.1 3/5/2018 11:25 AM Jerica Olivier MD Physician Sign            Progress Notes by White, Corinne C, LSW at 3/5/2018 10:03 AM     Author:  White, Corinne C, LSW Service:  (none) Author Type:      Filed:  3/5/2018 10:06 AM Date of Service:  3/5/2018 10:03 AM Creation Time:  3/5/2018 10:03 AM    Status:  Signed :  White, Corinne C, LSW ()         CM: Called [facility]. PT was still in TCU however there was a discussion about going comfort care. Will touch base with family to see if they plan to have pt return on Hospice take pt home[CW1.1]     Revision History        User Key Date/Time User Provider Type Action    > CW1.1 3/5/2018 10:06 AM White, Corinne C, LSW  Sign            Progress Notes by Jazz Goss RD, LD at 3/5/2018  9:58 AM     Author:  Jazz Goss RD, LD Service:  Nutrition Author Type:  Registered Dietitian    Filed:  3/5/2018  9:59 AM Date of Service:  3/5/2018  9:58 AM Creation Time:  3/5/2018  9:58 AM    Status:  Signed :  Jazz Goss RD, LD (Registered Dietitian)         NUTRITION BRIEF  NOTE    Positive nutrition risk screen - Unintentional weight loss of 10# or more in past 2 months    Per chart review, palliative care to see patient today, poor prognosis and remains NPO.  Plan includes comfort cares.    Will follow daily during IDT rounds and complete a full nutrition assessment if goals of care are restorative.      Jazz Goss RDN, AMARI, [facility]  Pager - 3rd floor/ICU: [phone]  Pager - All other floors: [phone]  Pager - Weekend/holiday: [phone]  Office: [phone][RP1.1]       Revision History        User Key Date/Time User Provider Type Action    > RP1.1 3/5/2018  9:59 AM Jazz Goss RD, AMARI Registered Dietitian Sign            Progress Notes by Katelyn Morrissey MD at 3/5/2018  9:02 AM     Author:  Katelyn Morrissey MD Service:  Oncology Author Type:  Physician    Filed:  3/5/2018  9:06 AM Date of Service:  3/5/2018  9:02 AM Creation Time:  3/5/2018  9:02 AM    Status:  Addendum :  Katelyn Morrissey MD (Physician)         Oncology/Hematology Follow Up Note:    Assessment and Plan:    Josy Thomson is a 89 year old female who was admitted on 3/2/2018.         Myelodysplasia  -followed by me.  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    PLAN:  I d/w daughter via phone and had a long chat with her, about goals of care.  - She is very open, to palliative care.  - She wants comfort care for her mom.      Pancytopenia  -due to myelodysplasia  -renal failure may be contributing to  anemia  -continue erythropoietin   -ok for red cell transfusion as ordered   -continue red cell transfusions for hemoglobin <7 and platelets for platelet < 10,000 or bleeding  -can consider short term addition of neupogen if fever persists  -monitor CBC serially    PLAN:  No PRBC today.      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      Labs:  All labs reviewed    CBC  Recent Labs  Lab 03/05/18  0623 03/04/18  0652 03/03/18  1001   WBC 0.8* 0.6* 0.5*   HGB 7.2* 7.7* 6.6*   MCV 88 86 88   PLT 16* 18* 31*       CMP  Recent Labs  Lab 03/05/18  0623 03/04/18  0652 03/03/18  0707 03/02/18  2114 03/02/18  2101   * 145* 138 136 136   POTASSIUM 3.6 3.6 4.0 4.5 4.4   CHLORIDE 118* 113* 106 99 101   CO2 23 23 25  --  26   ANIONGAP 7 9 7 10 9   * 133* 103* 75 74   BUN 88* 86* 87* 75* 86*   CR 1.86* 2.03* 2.35*  --  2.67*   GFRESTIMATED 25* 23* 19* 13* 17*   GFRESTBLACK 31* 28* 24* 15* 20*   TYREE 8.6 8.2* 8.3*  --  8.7   PROTTOTAL  --   --   --   --  5.8*   ALBUMIN  --   --   --   --  1.7*   BILITOTAL  --   --   --   --  1.0   ALKPHOS  --   --   --   --  152*   AST  --   --   --   --  258*   ALT  --   --   --   --  349*       INRNo lab results found in last 7 days.    Blood Culture  Recent Labs  Lab 03/02/18  2350 03/02/18  2218 03/02/18  2127   CULT <1000 colonies/mLurogenital carolina No growth after 2 days No growth after 2 days         Katelyn Morrissey MD  [facility]  3/5/2018 9:02 AM[AS1.1]           Revision History        User Key Date/Time User Provider Type Action    > [N/A]  3/5/2018  9:06 AM Katelyn Morrissey MD Physician Addend     AS1.1 3/5/2018  9:05 AM Katelyn Morrissey MD Physician Sign            Progress Notes by Jerica Olivier MD at 3/4/2018  6:05 PM     Author:  Jerica Olivier MD Service:  Hospitalist Author Type:  Physician    Filed:  3/4/2018  6:07 PM Date of Service:  3/4/2018  6:05 PM Creation Time:  3/4/2018  6:05 PM    Status:  Signed :  Jerica Olivier MD (Physician)                         [facility]  Hospitalist Progress Note  Name: Josy Thomson    MRN: [id number]  YOB: 1927    Age: 89 year old  Date of admission: 3/2/2018  Primary care provider: Abeba Bradford        Reason for Stay (Diagnosis): Healthcare associated pneumonia/neutropenic fever          Assessment and Plan:      Summary of Stay:  Josy Thomson is a 89 year old female patient with past medical history of myelodysplastic disorder, anemia, hypertension, arthritis, recent influenza infection, was brought from transitional care unit for evaluation for change in mental status.  History was obtained from patient's daughters.  Patient was febrile and tachycardic.  Laboratory workup showed WBC 0.3, creatinine 3.4.  CT of the abdomen/pelvis showed bibasilar consolidation suspicious for pneumonia.  She was given IV vancomycin and cefepime.  She was admitted to [facility] for further management.  Okay to transfer to [facility] status on 3/4/18.      1.  Healthcare associated pneumonia/neutropenic fever.  --Continue vancomycin and cefepime for now.  IV fluid resuscitation.      2.  Acute toxic/infectious encephalopathy secondary to above: Slowly improving so lethargic.  Otherwise, no localizing neurologic symptoms.     3.  Pancytopenia with history of myelodysplastic disorder: Oncology input appreciated.  Given progressive anemia.  Status post 1 unit of packed RBC on 3/3/18 with good results.  No evidence of acute blood loss anemia.    4.  Acute kidney injury, likely due to decreased  "intake, sepsis:  Continue IV fluid resuscitation.    5.  Will request palliative care consultation to discuss long-term goals as prognosis long-term is guarded     DVT Prophylaxis: Pneumatic Compression Devices  Code Status: DNR / DNI  Discharge Dispo: tbd  Estimated Disch Date / # of Days until Disch: Suspect 2-3 more days in the hospital.  Time spent: Greater than 35 minutes.  Plan of care discussed with daughter.      Interval History (Subjective):      Limited historian secondary to lethargy and confusion.            Physical Exam:      Vital signs:  Temp: 96.2  F (35.7  C) Temp src: Axillary BP: 97/45   Heart Rate: 98 Resp: 20 SpO2: 96 % O2 Device: Nasal cannula Oxygen Delivery: 4 LPM   Weight: 59.7 kg (131 lb 11.2 oz)  Estimated body mass index is 26.6 kg/(m^2) as calculated from the following:    Height as of 2/18/18: 1.499 m (4' 11\").    Weight as of this encounter: 59.7 kg (131 lb 11.2 oz).     # Pain Assessment:   Current Pain Score 3/3/2018 3/3/2018 3/3/2018   Patient currently in pain? GRIS denies yes   Pain score (0-10) - - -   Pain location - - Arm   Pain descriptors - - Aching         I/O last 3 completed shifts:  In: -   Out: 300 [Urine:300]       Vitals:     03/02/18 2331 03/03/18 0128   Weight: 52 kg (114 lb 10.2 oz) 59.7 kg (131 lb 11.2 oz)         Constitutional:  Lethargic and somnolent.  Otherwise appear comfortable and in no apparent distress   Respiratory: Nl work of breathing.  Crackles right lung base   Cardiovascular: Regular rate and rhythm, normal S1 and S2, and no murmur noted   Abdomen: Normal bowel sounds, soft, non-distended, non-tender   Skin: No rashes, no cyanosis, dry to touch   Neuro: CN 2-12 intact, mobilizes all 4 extremities and responds to pain stimuli   Extremities: No edema, normal range of motion   HEENT Normocephalic, atraumatic, normal nasal turbinates; oropharynx clear   Neck Supple; nl inspection; trachea midline; no thryomegaly   Psychiatric:  Unable           " Medications:      All current medications were reviewed with changes reflected in problem list.          Data:      All new lab and imaging data was reviewed.   Labs:     Recent Labs  Lab 03/02/18  2350 03/02/18 2218 03/02/18 2127 02/26/18  0546   CULT PENDING No growth after 15 hours No growth after 15 hours 50,000 to 100,000 colonies/mLmixed urogenital floraSusceptibility testing not routinely done         Recent Labs  Lab 03/03/18  1001 03/03/18  0707 03/02/18 2114 03/02/18 2101   WBC 0.5* 0.5*  --  0.3*   HGB 6.6* 7.0* 18.7* 7.0*   HCT 20.5* 21.3*  --  21.6*   MCV 88 88  --  88   PLT 31* 23*  --  37*         Recent Labs  Lab 03/03/18  0707 03/02/18 2114 03/02/18 2101 03/02/18  0702    136 136 136   POTASSIUM 4.0 4.5 4.4 4.6   CHLORIDE 106 99 101 100   CO2 25  --  26 26   ANIONGAP 7 10 9 10   * 75 74 95   BUN 87* 75* 86* 65*   CR 2.35*  --  2.67* 2.45*   GFRESTIMATED 19* 13* 17* 19*   GFRESTBLACK 24* 15* 20* 22*   TYREE 8.3*  --  8.7 8.7   PROTTOTAL  --   --  5.8*  --    ALBUMIN  --   --  1.7*  --    BILITOTAL  --   --  1.0  --    ALKPHOS  --   --  152*  --    AST  --   --  258*  --    ALT  --   --  349*  --          Recent Labs  Lab 03/02/18 2350   COLOR Nai   APPEARANCE Cloudy   URINEGLC Negative   URINEBILI Negative   URINEKETONE Negative   SG 1.018   UBLD Negative   URINEPH 5.0   PROTEIN Negative   NITRITE Negative   LEUKEST Negative   RBCU 2   WBCU 3      Imaging:       Recent Results (from the past 24 hour(s))   POC US ABDOMEN LIMITED     Impression     PROCEDURE NOTE --> Emergency Bedside Ultrasound     Procedure Name: Modified RUSH exam     Preformed by: Wilber Qureshi MD     Indication - Hypotension     Probe: low frequency curvilinear probe     Windows - Hepatorenal, Splenorenal, Suprapubic, Subxyphoid, Abdominal Aortic Views, IVC, bilateral lung windows     Findings - No intraperitoneal free fluid, No pericardial effusion, No Pneumothorax, No Abdominal aortic aneurysm, <18mm, >30%  collapsibility IVC, grossly normal contractility,      Impression  -hypovolemia preserved contractility no sign of obstructive shock     Images saved on ultrasound internal hard drive per protocol   XR Chest Port 1 View     Narrative     PORTABLE CHEST ONE VIEW   3/2/2018  9:36 PM      HISTORY: Fever.     COMPARISON: 2/17/2018 chest x-ray.     Impression     IMPRESSION: Moderate stable cardiomegaly. Mildly prominent central  pulmonary vasculature. Increased density in the perihilar regions  bilaterally and left lower lobe. These opacities could be caused by  congestive heart failure. Cannot exclude pneumonia in the right  perihilar region or left lower lobe.     Advanced bilateral shoulder degenerative changes.     ANURADHA SERRANO MD   Abd/pelvis CT no contrast - Stone Protocol     Narrative     CT ABDOMEN AND PELVIS WITHOUT CONTRAST   3/2/2018 10:37 PM      HISTORY: Abdominal pain, fever, altered mental status.     TECHNIQUE: No IV contrast material. Radiation dose for this scan was  reduced using automated exposure control, adjustment of the mA and/or  kV according to patient size, or iterative reconstruction technique.     COMPARISON: CT scan from 6/15/2012.     FINDINGS: Scans through the lung bases demonstrate bibasilar  consolidation suspicious for pneumonia. I suspect there is bilateral  hilar adenopathy as well.     The noncontrast appearance of the liver is normal. There appears to be  a peripherally calcified porcelain type gallbladder with variable  density within it likely sludge or cholesterol gallstones. Polyps not  excluded. The low-density areas within the gallbladder are more  prominent than on the comparison study. Spleen is atrophic. Pancreas  is atrophic but otherwise unremarkable. No adrenal lesions. No kidney  stones or hydronephrosis. No contour deforming renal masses. There is  likely a left mid pole renal cortical cyst     No evidence of abdominal aortic aneurysm or  retroperitoneal  adenopathy.     Scans through the pelvis demonstrate calcified uterine fibroid.  Bladder has a normal appearance. No evidence of diverticulitis or  appendicitis. No evidence of bowel obstruction. No free air or free  fluid.     Impression     IMPRESSION:  1. Bibasilar consolidation suspicious for pneumonia. There is likely  bilateral hilar adenopathy as well seen on the images that include a  portion of the lower chest.  2. There is a peripherally calcified porcelain type gallbladder with  mixed density material within it. Two low density round areas are more  prominent than on the comparison study and could represent cholesterol  gallstones. A polyp of some sort would be difficult to exclude. On  this study, there are two low-density areas in the gallbladder and  previously there was one.  3. No evidence of diverticulitis or appendicitis.     ANURADHA SERRANO MD   Head CT w/o contrast     Narrative     CT SCAN OF THE HEAD WITHOUT CONTRAST   3/2/2018 10:38 PM      HISTORY: Agitation, acute altered mental status.       TECHNIQUE:  Axial images of the head and coronal reformations without  IV contrast material. Radiation dose for this scan was reduced using  automated exposure control, adjustment of the mA and/or kV according  to patient size, or iterative reconstruction technique.     COMPARISON: 2/16/2018.     FINDINGS: There is no evidence of intracranial hemorrhage, mass, acute  infarct or anomaly. There is generalized atrophy of the brain. There  is low attenuation in the white matter of the cerebral hemispheres  consistent with sequelae of small vessel ischemic disease.      The visualized portions of the sinuses and mastoids appear normal. The  bony calvarium and bones of the skull base appear intact. Bilateral  pseudophakia.     Impression     IMPRESSION:     1. No evidence of acute intracranial hemorrhage, mass, or herniation.  2. There is generalized atrophy of the brain. White matter changes  are  present in the cerebral hemispheres that are consistent with small  vessel ischemic disease in this age patient.        MD Jerica MALIK [phone]                [DH1.1]          Revision History        User Key Date/Time User Provider Type Action    > DH1.1 3/4/2018  6:07 PM Jerica Olivier MD Physician Sign            Progress Notes by Gopi Gagnon MD at 3/4/2018 10:46 AM     Author:  Gopi Gagnon MD Service:  Oncology Author Type:  Physician    Filed:  3/4/2018 10:51 AM Date of Service:  3/4/2018 10:46 AM Creation Time:  3/4/2018 10:46 AM    Status:  Signed :  Gopi Gagnon MD (Physician)         [facility]    Oncology Progress Note    Date of Service (when I saw the patient): 03/04/2018     Assessment & Plan   Josy Thomson is a 89 year old female who was admitted on 3/2/2018.       Myelodysplasia  -followed by Dr. 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, 5q-, low risk cytogenetics, <5%blasts  -has since received erythropoietin and supportive transfusions  -has declined lenalidomide or other therapy due to potential toxicities and her age/compromised performance status  -baseline severe pancytopenia (ANC <0.5, hgb <7, plt 30-40)  -continue with supportive care  -long term prognosis very poor and may benefit from family conference and palliative care input to review goals of therapy once acute deterioration is stabilized     Pancytopenia  -due to myelodysplasia  -renal failure may be contributing to anemia  -continue erythropoietin   -ok for red cell transfusion as ordered   -continue red cell transfusions for hemoglobin <7 and platelets for platelet < 10,000 or bleeding  -can consider short term addition of neupogen if fever persists  -monitor CBC serially     Secondary Hemachromatosis  -related to frequent transfusions  -continue desferal     ID  -healthcare associated pneumonia  -agree with broad spectrum antibiotics     Renal Failure  -on IV  fluids  -appreciate hospitalist care     Encephalopathy  -monitor with supportive care     Liver Function Test Elevations  -iron over load and infection  may be contributing  -CT abdomen noted  -monitor        Code Status: DNR/DNI    Gopi Gagnon    Interval History   No new concerns    Physical Exam   Temp: 97.3  F (36.3  C) Temp src: Axillary BP: 145/67   Heart Rate: 107 Resp: 22 SpO2: 93 % O2 Device: None (Room air) Oxygen Delivery: 2 LPM  Vitals:    03/02/18 2331 03/03/18 0128   Weight: 52 kg (114 lb 10.2 oz) 59.7 kg (131 lb 11.2 oz)     Vital Signs with Ranges  Temp:  [95.3  F (35.2  C)-100.7  F (38.2  C)] 97.3  F (36.3  C)  Heart Rate:  [] 107  Resp:  [18-24] 22  BP: ()/(39-67) 145/67  SpO2:  [93 %-100 %] 93 %  I/O last 3 completed shifts:  In: 1052 [I.V.:1052]  Out: 720 [Urine:720]    Constitutional: resting comfortably      Medications     dextrose 5% and 0.9% NaCl 100 mL/hr at 03/04/18 0804       QUEtiapine  12.5 mg Oral Once     sodium chloride (PF)  3 mL Intracatheter Q8H     ceFEPIme (MAXIPIME) IV  2,000 mg Intravenous Q24H     vancomycin place puentes - receiving intermittent dosing  1 each Does not apply See Admin Instructions     haloperidol lactate  2 mg Intravenous Once     Carboxymethylcellulose Sod PF  1 drop Both Eyes TID     HYDROmorphone  0.3 mg Intravenous TID     ipratropium - albuterol 0.5 mg/2.5 mg/3 mL  3 mL Nebulization Q4H       Data   Results for orders placed or performed during the hospital encounter of 03/02/18 (from the past 24 hour(s))   ABO/Rh type and screen   Result Value Ref Range    Units Ordered 1     ABO O     RH(D) Pos     Antibody Screen Neg     Test Valid Only At [facility]        Specimen Expires 03/06/2018     Crossmatch Red Blood Cells    Blood component   Result Value Ref Range    Unit Number [id number]     Blood Component Type Red Blood Cells Leukocyte Reduced     Division Number 00     Status of Unit Released to care unit     Blood  Product Code [id number]     Unit Status ISS    Care Coordinator IP Consult    Narrative    Alexandra PotterKYRA     3/3/2018  5:01 PM  Care Transition Initial Assessment - RN    Reason For Consult: care coordination/care conference, community   resources, discharge planning   Information obtained through chart review.  DATA   Active Problems:    HCAP (healthcare-associated pneumonia)       Cognitive Status: awake and confused.  Primary Care Clinic Name: [facility]  Primary Care MD Name: Dr. Abeba Bradford  Contact information and PCP information verified: Yes  Lives With: alone  Living Arrangements: house     Description of Support System: Supportive, Involved   Who is your support system?: Children, Facility resident(s)/Staff     Support Assessment: Lacks necessary supervision and assistance,   Lacks adequate physical care, Caregiver experiencing high stress,   Caregiver difficulty providing physical care/safety   Insurance concerns: No Insurance issues identified  ASSESSMENT  Patient currently receives the following services:  When at home,   daughter Jazmine, is PCA. Not other services. Patient has [facility] [place] [phone].         Identified issues/concerns regarding health management: Home   safety. Daughter, Jazmine, is patient's PCA. She provides daily AM   cares. Jazmine will prep meals for the day as patient cannot cook   for herself; assist with hygiene cares. Patient has macular   degeneration w/very poor eye sight and very [place]. Mentation at   baseline is very sharp. She has been experiencing confusion the   last 2 admissions. Jazmine does all grocery shopping, pays bills,   provides transportation for patient. Also of concern, patient has   had multiple falls at home. She has a Life Alert but forgets to   push it.  Prior to last admission (2/16/18) there was concern for   caregiver fatigue. The patient was discharged to [facility] after last   admission. Unclear where [facility] is in the  process to obtain   additional help.   PLAN  Financial costs for the patient were not discussed.  Patient given options and choices for discharge.  Patient/family is agreeable to the plan?  Yes  Patient anticipates discharging back to TCU pending family care   conference.      Patient anticipates needs for home equipment: No  Discharge Planner   Discharge Plans in progress: Yes  Barriers to discharge plan: IV antibiotics  Plan/Disposition: TCU pending Pallitive recommendations.   Appointments: TBD    CM will continue to follow patient until discharge for any   additional needs.     Glo Potter RN, BSN, CTS  [facility]  [phone]         Glucose by meter   Result Value Ref Range    Glucose 140 (H) 70 - 99 mg/dL   Lactic acid level STAT   Result Value Ref Range    Lactic Acid 1.8 0.7 - 2.0 mmol/L   CBC with platelets differential   Result Value Ref Range    WBC 0.6 (LL) 4.0 - 11.0 10e9/L    RBC Count 2.67 (L) 3.8 - 5.2 10e12/L    Hemoglobin 7.7 (L) 11.7 - 15.7 g/dL    Hematocrit 22.9 (L) 35.0 - 47.0 %    MCV 86 78 - 100 fl    MCH 28.8 26.5 - 33.0 pg    MCHC 33.6 31.5 - 36.5 g/dL    RDW 15.1 (H) 10.0 - 15.0 %    Platelet Count 18 (LL) 150 - 450 10e9/L    Diff Method Manual Differential     % Neutrophils 56.0 %    % Lymphocytes 40.0 %    % Monocytes 2.0 %    % Eosinophils 0.0 %    % Basophils 0.0 %    % Metamyelocytes 2.0 %    Absolute Neutrophil 0.3 (LL) 1.6 - 8.3 10e9/L    Absolute Lymphocytes 0.2 (L) 0.8 - 5.3 10e9/L    Absolute Monocytes 0.0 0.0 - 1.3 10e9/L    Absolute Eosinophils 0.0 0.0 - 0.7 10e9/L    Absolute Basophils 0.0 0.0 - 0.2 10e9/L    Absolute Metamyelocytes 0.0 0 10e9/L    Anisocytosis Slight     Microcytes Present     Dohle Bodies Present     Toxic Granulation Present     Platelet Estimate       Automated count confirmed.  Giant platelets are present.   Basic metabolic panel   Result Value Ref Range    Sodium 145 (H) 133 - 144 mmol/L    Potassium 3.6 3.4 - 5.3 mmol/L    Chloride 113  (H) 94 - 109 mmol/L    Carbon Dioxide 23 20 - 32 mmol/L    Anion Gap 9 3 - 14 mmol/L    Glucose 133 (H) 70 - 99 mg/dL    Urea Nitrogen 86 (H) 7 - 30 mg/dL    Creatinine 2.03 (H) 0.52 - 1.04 mg/dL    GFR Estimate 23 (L) >60 mL/min/1.7m2    GFR Estimate If Black 28 (L) >60 mL/min/1.7m2    Calcium 8.2 (L) 8.5 - 10.1 mg/dL[EW1.1]        Revision History        User Key Date/Time User Provider Type Action    > EW1.1 3/4/2018 10:51 AM Gopi Gagnon MD Physician Sign            Progress Notes by Jerica Olivier MD at 3/3/2018  6:39 PM     Author:  Jerica Olivier MD Service:  Hospitalist Author Type:  Physician    Filed:  3/3/2018  6:39 PM Date of Service:  3/3/2018  6:39 PM Creation Time:  3/3/2018  6:39 PM    Status:  Signed :  Jerica Olivier MD (Physician)                         [facility]  Hospitalist Progress Note  Name: Josy Thomson    MRN: [id number]  YOB: 1927    Age: 89 year old  Date of admission: 3/2/2018  Primary care provider: Abeba Bradford        Reason for Stay (Diagnosis): Healthcare associated pneumonia/neutropenic fever          Assessment and Plan:      Summary of Stay:  Josy Thomson is a 89 year old female patient with past medical history of myelodysplastic disorder, anemia, hypertension, arthritis, recent influenza infection, was brought from transitional care unit for evaluation for change in mental status.  History was obtained from patient's daughters.  Patient was febrile and tachycardic.  Laboratory workup showed WBC 0.3, creatinine 3.4.  CT of the abdomen/pelvis showed bibasilar consolidation suspicious for pneumonia.  She was given IV vancomycin and cefepime.  She was admitted to [facility] for further management.      1.  Healthcare associated pneumonia/neutropenic fever.  --Continue vancomycin and cefepime for now.  IV fluid resuscitation.      2.  Acute toxic/infectious encephalopathy secondary to above: Slowly improving so lethargic.  Otherwise, no localizing  "neurologic symptoms.     3.  Pancytopenia with history of myelodysplastic disorder: Oncology input appreciated.  Given progressive anemia, will transfuse 1 unit of packed RBC.   4.  Acute kidney injury, likely due to decreased intake, sepsis:  Continue IV fluid resuscitation.     DVT Prophylaxis: Pneumatic Compression Devices  Code Status: DNR / DNI  Discharge Dispo: tbd  Estimated Disch Date / # of Days until Disch: Suspect 2-5 more days in the hospital.  Time spent: Greater than 35 minutes.  Plan of care discussed with daughter.      Interval History (Subjective):      Limited historian secondary to lethargy and confusion.  Daughter at the bedside.          Physical Exam:      Vital signs:  Temp: 96.2  F (35.7  C) Temp src: Axillary BP: 97/45   Heart Rate: 98 Resp: 20 SpO2: 96 % O2 Device: Nasal cannula Oxygen Delivery: 4 LPM   Weight: 59.7 kg (131 lb 11.2 oz)  Estimated body mass index is 26.6 kg/(m^2) as calculated from the following:    Height as of 2/18/18: 1.499 m (4' 11\").    Weight as of this encounter: 59.7 kg (131 lb 11.2 oz).     # Pain Assessment:   Current Pain Score 3/3/2018 3/3/2018 3/3/2018   Patient currently in pain? GRIS denies yes   Pain score (0-10) - - -   Pain location - - Arm   Pain descriptors - - Aching         I/O last 3 completed shifts:  In: -   Out: 300 [Urine:300]       Vitals:     03/02/18 2331 03/03/18 0128   Weight: 52 kg (114 lb 10.2 oz) 59.7 kg (131 lb 11.2 oz)         Constitutional:  Lethargic and somnolent.  Otherwise appear comfortable and in no apparent distress   Respiratory: Nl work of breathing.  Crackles right lung base   Cardiovascular: Regular rate and rhythm, normal S1 and S2, and no murmur noted   Abdomen: Normal bowel sounds, soft, non-distended, non-tender   Skin: No rashes, no cyanosis, dry to touch   Neuro: CN 2-12 intact, mobilizes all 4 extremities and responds to pain stimuli   Extremities: No edema, normal range of motion   HEENT Normocephalic, atraumatic, " normal nasal turbinates; oropharynx clear   Neck Supple; nl inspection; trachea midline; no thryomegaly   Psychiatric:  Unable           Medications:      All current medications were reviewed with changes reflected in problem list.          Data:      All new lab and imaging data was reviewed.   Labs:     Recent Labs  Lab 03/02/18  2350 03/02/18 2218 03/02/18 2127 02/26/18  0546   CULT PENDING No growth after 15 hours No growth after 15 hours 50,000 to 100,000 colonies/mLmixed urogenital floraSusceptibility testing not routinely done         Recent Labs  Lab 03/03/18  1001 03/03/18  0707 03/02/18 2114 03/02/18 2101   WBC 0.5* 0.5*  --  0.3*   HGB 6.6* 7.0* 18.7* 7.0*   HCT 20.5* 21.3*  --  21.6*   MCV 88 88  --  88   PLT 31* 23*  --  37*         Recent Labs  Lab 03/03/18 0707 03/02/18 2114 03/02/18 2101 03/02/18  0702    136 136 136   POTASSIUM 4.0 4.5 4.4 4.6   CHLORIDE 106 99 101 100   CO2 25  --  26 26   ANIONGAP 7 10 9 10   * 75 74 95   BUN 87* 75* 86* 65*   CR 2.35*  --  2.67* 2.45*   GFRESTIMATED 19* 13* 17* 19*   GFRESTBLACK 24* 15* 20* 22*   TYREE 8.3*  --  8.7 8.7   PROTTOTAL  --   --  5.8*  --    ALBUMIN  --   --  1.7*  --    BILITOTAL  --   --  1.0  --    ALKPHOS  --   --  152*  --    AST  --   --  258*  --    ALT  --   --  349*  --          Recent Labs  Lab 03/02/18 2350   COLOR Nai   APPEARANCE Cloudy   URINEGLC Negative   URINEBILI Negative   URINEKETONE Negative   SG 1.018   UBLD Negative   URINEPH 5.0   PROTEIN Negative   NITRITE Negative   LEUKEST Negative   RBCU 2   WBCU 3      Imaging:       Recent Results (from the past 24 hour(s))   POC US ABDOMEN LIMITED     Impression     PROCEDURE NOTE --> Emergency Bedside Ultrasound     Procedure Name: Modified RUS exam     Preformed by: Wilber Qureshi MD     Indication - Hypotension     Probe: low frequency curvilinear probe     Windows - Hepatorenal, Splenorenal, Suprapubic, Subxyphoid, Abdominal Aortic Views, IVC, bilateral lung  windows     Findings - No intraperitoneal free fluid, No pericardial effusion, No Pneumothorax, No Abdominal aortic aneurysm, <18mm, >30% collapsibility IVC, grossly normal contractility,      Impression  -hypovolemia preserved contractility no sign of obstructive shock     Images saved on ultrasound internal hard drive per protocol   XR Chest Port 1 View     Narrative     PORTABLE CHEST ONE VIEW   3/2/2018  9:36 PM      HISTORY: Fever.     COMPARISON: 2/17/2018 chest x-ray.     Impression     IMPRESSION: Moderate stable cardiomegaly. Mildly prominent central  pulmonary vasculature. Increased density in the perihilar regions  bilaterally and left lower lobe. These opacities could be caused by  congestive heart failure. Cannot exclude pneumonia in the right  perihilar region or left lower lobe.     Advanced bilateral shoulder degenerative changes.     ANURADHA SERRANO MD   Abd/pelvis CT no contrast - Stone Protocol     Narrative     CT ABDOMEN AND PELVIS WITHOUT CONTRAST   3/2/2018 10:37 PM      HISTORY: Abdominal pain, fever, altered mental status.     TECHNIQUE: No IV contrast material. Radiation dose for this scan was  reduced using automated exposure control, adjustment of the mA and/or  kV according to patient size, or iterative reconstruction technique.     COMPARISON: CT scan from 6/15/2012.     FINDINGS: Scans through the lung bases demonstrate bibasilar  consolidation suspicious for pneumonia. I suspect there is bilateral  hilar adenopathy as well.     The noncontrast appearance of the liver is normal. There appears to be  a peripherally calcified porcelain type gallbladder with variable  density within it likely sludge or cholesterol gallstones. Polyps not  excluded. The low-density areas within the gallbladder are more  prominent than on the comparison study. Spleen is atrophic. Pancreas  is atrophic but otherwise unremarkable. No adrenal lesions. No kidney  stones or hydronephrosis. No contour deforming renal  masses. There is  likely a left mid pole renal cortical cyst     No evidence of abdominal aortic aneurysm or retroperitoneal  adenopathy.     Scans through the pelvis demonstrate calcified uterine fibroid.  Bladder has a normal appearance. No evidence of diverticulitis or  appendicitis. No evidence of bowel obstruction. No free air or free  fluid.     Impression     IMPRESSION:  1. Bibasilar consolidation suspicious for pneumonia. There is likely  bilateral hilar adenopathy as well seen on the images that include a  portion of the lower chest.  2. There is a peripherally calcified porcelain type gallbladder with  mixed density material within it. Two low density round areas are more  prominent than on the comparison study and could represent cholesterol  gallstones. A polyp of some sort would be difficult to exclude. On  this study, there are two low-density areas in the gallbladder and  previously there was one.  3. No evidence of diverticulitis or appendicitis.     ANURADHA SERRANO MD   Head CT w/o contrast     Narrative     CT SCAN OF THE HEAD WITHOUT CONTRAST   3/2/2018 10:38 PM      HISTORY: Agitation, acute altered mental status.       TECHNIQUE:  Axial images of the head and coronal reformations without  IV contrast material. Radiation dose for this scan was reduced using  automated exposure control, adjustment of the mA and/or kV according  to patient size, or iterative reconstruction technique.     COMPARISON: 2/16/2018.     FINDINGS: There is no evidence of intracranial hemorrhage, mass, acute  infarct or anomaly. There is generalized atrophy of the brain. There  is low attenuation in the white matter of the cerebral hemispheres  consistent with sequelae of small vessel ischemic disease.      The visualized portions of the sinuses and mastoids appear normal. The  bony calvarium and bones of the skull base appear intact. Bilateral  pseudophakia.     Impression     IMPRESSION:     1. No evidence of acute  "intracranial hemorrhage, mass, or herniation.  2. There is generalized atrophy of the brain. White matter changes are  present in the cerebral hemispheres that are consistent with small  vessel ischemic disease in this age patient.        MD Jerica MALIK [phone]                [DH1.1]          Revision History        User Key Date/Time User Provider Type Action    > DH1.1 3/3/2018  6:39 PM Jerica Olivier MD Physician Sign            Progress Notes by Stephan Tai RT at 3/3/2018  2:13 PM     Author:  Stephan Tai RT Service:  (none) Author Type:  Respiratory Therapist    Filed:  3/3/2018  4:31 PM Date of Service:  3/3/2018  2:13 PM Creation Time:  3/3/2018  4:25 PM    Status:  Signed :  Stephan Tai RT (Respiratory Therapist)         [facility] hospital [facility]     Date: 3/3/18  Admission Dx: Pneumonia  Pulmonary History: None  Home Nebulizer/MDI Use: Albuterol TID  Home Oxygen: Unknown, Pt is poor historian.  Acuity Level (RCAT flow sheet): 2  Aerosol Therapy initiated: Changed Duoneb QID to Q4 hours, Changed albuterol Q2 hours prn to Q4 hours prn.      Pulmonary Hygiene initiated: Deep breath and cough QID.      Volume Expansion initiated: IS QID if she is able.      Current Oxygen Requirements: 3 Lpm NC  Current SpO2: 95%    Re-evaluation date: 3/6/2018    Patient Education: Informed Pt of RCAT.  She is not following commands the best at the moment and is confused.      See \"RT Assessments\" flow sheet for patient assessment scoring and Acuity Level Details.       Stephan Tai  March 3, 2018.2:13 PM[RA1.1]               Revision History        User Key Date/Time User Provider Type Action    > RA1.1 3/3/2018  4:31 PM Stephan Tai, RT Respiratory Therapist Sign            ED Provider Notes by Wilber Qureshi MD at 3/2/2018  8:44 PM     Author:  Wilber Qureshi MD Service:  Emergency Medicine Author Type:  Physician    Filed:  3/3/2018  1:03 AM Date of Service:  3/2/2018  8:44 PM " Creation Time:  3/2/2018  9:03 PM    Status:  Signed :  Wilber Qureshi MD (Physician)           History     Chief Complaint:  Altered Mental Status    History obtained from the patient's daughters secondary to the patient's altered mental status.    HPI    Josy Thomson is a 89 year old female[AG1.1] with a history of[AG1.2] myelodysplastic syndrome transfusion dependent, pancytopenia, and hypertension, among others[AG1.3] who presents to the emergency department today for evaluation of altered mental status. The patient's daughter reports that the patient[AG1.1] was recently admitted from 02/16/2018 to 02/24/2018 for treatment of generalized weakness, volume depletion, and confusion. She states that the patient receives blood transfusions and deferoxamine every two weeks, most recently on 02/23/2018. She reports that they moved the patient to a [facility] center on 02/24/2018 and that on 02/25/2018 she developed a fever as high as 104. She states that a chest-xray was completed as well as a flu swab and urinalysis. She reports that the patient was positive for influenza and was started on Tamiflu. She notes that today is day 5 of the patient's Tamiflu. She reports that the patient coughed up a thick milky substance from her lungs three days ago but since that time has been too weak to cough any mucous up. She states that the patient[AG1.3] h[AG1.2]as been on oxygen and received a nebulizer treatment yesterday[AG1.3] without improvement in her symptoms. The patient's daughter reports that the patient would not take her medications this afternoon because she became agitated, uncooperative, and confused. She states that the patient is usually very sharp at baseline. She reports that the patient has continued to have a low-grade temperature, and that she had tylenol most recently this morning. She denies vomiting or diarrhea. She reports that the patient fell twice at the end of January 2018 and had a normal  head CT and has not fallen since. She notes that the patient has history of ORIF of a right shoulder fracture 12 years ago. She voices no further concerns.[AG1.2]    Allergies:  No Known Drug Allergies     Medications:    Darbepoetin ang-polysorbate  Tylenol  Venlafaxine  Prilosec  Metoprolol    Past Medical History:    Anemia  Arthritis  History of blood transfusion  Pneumonia  Hypertension  Macular degeneration  Tachycardia  Pancytopenia  Myelodysplastic syndrome  GI bleed  Hematochezia    Past Surgical History:    Bone marrow biopsy, bone specimen, needle trocar, right  ENT surgery  Right shoulder orthopedic surgery, pinning    Family History:    History reviewed. No pertinent family history.    Social History:  The patient was accompanied to the ED by her two daughters.  Smoking Status: Never Smoker  Smokeless Tobacco: Never Used  Alcohol Use: Negative  Marital Status:       Review of Systems   Constitutional: Positive for[AG1.1] fever[AG1.2].   Respiratory: Positive for[AG1.1] cough (productive)[AG1.2].    Gastrointestinal: Negative for[AG1.1] diarrhea[AG1.2] and[AG1.1] vomiting[AG1.2].   Neurological: Positive for[AG1.1] weakness[AG1.2].   Psychiatric/Behavioral: Positive for[AG1.1] agitation[AG1.2],[AG1.1] behavioral problems[AG1.2] and[AG1.1] confusion[AG1.2].[AG1.1]        Positive for altered mental status.   All other systems reviewed and are negative[AG1.2].    Physical Exam[AG1.1]     Patient Vitals for the past 24 hrs:   BP Temp Temp src Heart Rate Resp SpO2   03/02/18 2219 - - - 101 - 90 %   03/02/18 2218 - - - 101 - 91 %   03/02/18 2215 106/58 - - 103 - -   03/02/18 2208 - - - 101 - 95 %   03/02/18 2207 - - - 99 - 94 %   03/02/18 2206 - - - 101 - 93 %   03/02/18 2205 - - - 101 - 93 %   03/02/18 2204 - - - 101 - 93 %   03/02/18 2203 - - - 101 - 94 %   03/02/18 2202 - - - 102 - 94 %   03/02/18 2201 - - - 103 - 95 %   03/02/18 2200 107/60 - - 102 - -   03/02/18 2145 100/59 - - 102 - -   03/02/18  2130 106/59 - - 105 - -   03/02/18 2115 (!) 84/54 - - 106 - -   03/02/18 2100 (!) 85/54 - - 106 - -   03/02/18 2047 100/65 98.4  F (36.9  C) Axillary 111 20 93 %[AG1.4]     Physical Exam   Constitutional:[AG1.1]   Ill and frail-appearing.  Tachypneic.  Agitated[JW1.1]   HENT:   Head:[AG1.1] Atraumatic[JW1.1].   Right Ear:[AG1.1] External ear[JW1.1] normal.   Left Ear:[AG1.1] External ear[JW1.1] normal.   Nose:[AG1.1] Nose normal[JW1.1].[AG1.1]   Dry mucous membranes[JW1.1]   Eyes:[AG1.1] Conjunctivae[JW1.1] and[AG1.1] lids[JW1.1] are normal.[AG1.1] No scleral icterus[JW1.1].   Neck:[AG1.1] Neck supple[JW1.1].[AG1.1] No tracheal deviation[JW1.1] present.   Cardiovascular:[AG1.1] Regular rhythm[JW1.1] and[AG1.1] intact distal pulses[JW1.1].[AG1.1]    Tachycardic[JW1.1]   Pulmonary/Chest:[AG1.1]   Tachypnea diffuse coarse breath sounds[JW1.1]   Abdominal:[AG1.1] Soft[JW1.1]. She exhibits[AG1.1] distension (Mild)[JW1.1]. There is[AG1.1] tenderness (Generalized tenderness to palpation no rigidity)[JW1.1]. There is[AG1.1] no rebound[JW1.1] and[AG1.1] no guarding[JW1.1].   Musculoskeletal:[AG1.1]   No peripheral edema[JW1.1]   Neurological:[AG1.1]   Arouses to voice is easily agitated with attempted cares somewhat redirectable by family.  She will eventually follow simple commands such as grasping hand and wiggling toes with repeated verbal prompts[JW1.1]   Skin: Skin is[AG1.1] warm[JW1.1] and[AG1.1] dry[JW1.1]. She is[AG1.1] not diaphoretic[JW1.1].   Psychiatric:[AG1.1]   Agitated   Nursing note[JW1.1] and[AG1.1] vitals[JW1.1] reviewed.          Emergency Department Course     ECG:  ECG taken at[AG1.1] 2245[AG1.5], ECG read at[AG1.1] 2249  Sinus tachycardia  Right bundle branch block  Cannot rule out Inferior infarct, age undetermined  Abnormal ECG[AG1.5]  Rate[AG1.1] 103[AG1.5] bpm. NE interval[AG1.1] 146[AG1.5] ms. QRS duration[AG1.1] 134[AG1.5] ms. QT/QTc[AG1.1] 348/455[AG1.5] ms. P-R-T axes[AG1.1] 61 47  43[AG1.5].    Imaging:  Radiology findings were communicated with the[AG1.1] patient[AG1.6] who voiced understanding of the findings.[AG1.1]    Abd/pelvis CT no contrast - Stone Protocol  1. Bibasilar consolidation suspicious for pneumonia. There is likely  bilateral hilar adenopathy as well seen on the images that include a  portion of the lower chest.  2. There is a peripherally calcified porcelain type gallbladder with  mixed density material within it. Two low density round areas are more  prominent than on the comparison study and could represent cholesterol  gallstones. A polyp of some sort would be difficult to exclude. On  this study, there are two low-density areas in the gallbladder and  previously there was one.  3. No evidence of diverticulitis or appendicitis.  ANURADHA SERRANO MD  Reading per radiology    Head CT w/o contrast  1. No evidence of acute intracranial hemorrhage, mass, or herniation.  2. There is generalized atrophy of the brain. White matter changes are  present in the cerebral hemispheres that are consistent with small  vessel ischemic disease in this age patient.  BETHANY HUANG MD  Reading per radiology    XR Chest Port 1 View  Moderate stable cardiomegaly. Mildly prominent central  pulmonary vasculature. Increased density in the perihilar regions  bilaterally and left lower lobe. These opacities could be caused by  congestive heart failure. Cannot exclude pneumonia in the right  perihilar region or left lower lobe.  Advanced bilateral shoulder degenerative changes.  ANURADHA SERRANO MD  Reading per radiology    POC US Abdomen Limited[AG1.6]  PROCEDURE NOTE --> Emergency Bedside Ultrasound    Procedure Name: Modified RUSH exam    Preformed by: Wilber Qureshi MD    Indication - Hypotension    Probe: low frequency curvilinear probe    Windows - Hepatorenal, Splenorenal, Suprapubic, Subxyphoid, Abdominal Aortic Views, IVC, bilateral lung windows    Findings - No intraperitoneal free fluid, No  pericardial effusion, No Pneumothorax, No Abdominal aortic aneurysm, <18mm, >30% collapsibility IVC, grossly normal contractility,     Impression  -hypovolemia preserved contractility no sign of obstructive shock    Images saved on ultrasound internal hard drive per protocol[JW1.1]      Laboratory:  Laboratory findings were communicated with the[AG1.1] patient's daughters[AG1.7] who voiced understanding of the findings.[AG1.1]    Blood culture: pending[AG1.8] x2[AG1.7]  ISTAT Basic Met ICa HCT (Collected at 2114): BUN 75 (H), Creatinine 3.4 (H), GFR Estimate 13 (L), HGB 18.7 (H), Hematocrit 55 (H)  ISTAT gases lactate venous (Collected at 2111): pH 7.34, pCO2 47, pO2 43, bicarbonate 25, O2 Sat 76, Lactic acid 1.1[AG1.8]  CBC: WBC[AG1.1] 0.3 (LL)[AG1.8], HGB[AG1.1] 7.0 (L)[AG1.8], PLT[AG1.1] 37 (LL)  C[AG1.8]MP:[AG1.1] BUN 86 (H), Creatinine 2.67 (H), GFR Estimate 17 (L), Albumin 1.7 (L), Protein Total 5.8 (L), Alk Phos 152 (H),  (H),  (H), o/w[AG1.7] WNL[AG1.1]  BNP:[AG1.8] 25410 (H)[AG1.7]    Interventions:[AG1.1]  2100  mL IV  2145 LR 1000 mL IV[AG1.8]  2240 Maxipime 2 g IV[AG1.6]    Medications   vancomycin 1250 mg in 0.9% NaCl 250 mL PREMIX (not administered)   lactated ringers BOLUS 1,000 mL (0 mLs Intravenous Stopped 3/2/18 2212)   0.9% sodium chloride BOLUS (0 mLs Intravenous Stopped 3/2/18 2211)   ceFEPIme (MAXIPIME) 2 g vial to attach to  ml bag for ADULTS or 50 ml bag for PEDS (2 g Intravenous New Bag 3/2/18 2240)[AG1.4]     Emergency Department Course:  Nursing notes and vitals reviewed.  I performed an exam of the patient as documented above.[AG1.1]   IV was inserted and blood was drawn for laboratory testing, results above.[AG1.3]  The patient was sent for a XR Chest Port 1 View, Head CT w/o Contrast, Abd/pelvis CT no contrast, and POC US Abdomen Limited while in the emergency department, results above.[AG1.7]   2109 A second IV was inserted using ultrasound guidance into the  patient's right arm.[AG1.3]  2226 I rechecked the patient and her family and updated them regarding her results.[AG1.9]  2250 I discussed the treatment plan with the patient. They expressed understanding of this plan and consented to admission.  2302 I discussed the patient with Dr. Garcia, who will admit the patient to a monitored bed for further evaluation and treatment.[AG1.6]  I personally reviewed the[AG1.1] ECG, imaging, and laboratory[AG1.6] results with the[AG1.1] patient's daughters[AG1.6] and answered all related questions prior to[AG1.1] admission[AG1.6].    Impression & Plan      CMS Diagnoses:[AG1.1]             The patient has signs of Severe Sepsis as evidenced by:    1. 2 SIRS criteria, AND  2. Suspected infection, AND   3. Organ dysfunction: 2 consecutive blood pressures less than 90 and new renal failure    Time severe sepsis diagnosis confirmed = 2221 as this was the time when Lactate resulted, and the level was >2 and Chest x-ray resulted confirming source of infection      3 Hour Severe Sepsis Bundle Completion:  1. Initial Lactic Acid Result:[JW1.2]   Recent Labs   Lab Test  03/02/18   2111  02/22/18   1009  02/20/18   1710   LACT  1.1  1.2  1.8[JW1.3]     2. Blood Cultures before Antibiotics: Yes  3. Broad Spectrum Antibiotics Administered: Yes[JW1.2]     Anti-infectives (Future)    Start     Dose/Rate Route Frequency Ordered Stop    03/02/18 2205  vancomycin 1250 mg in 0.9% NaCl 250 mL PREMIX      1,250 mg  166.7 mL/hr over 90 Minutes Intravenous ONCE 03/02/18 2205[JW1.3]          4. 1500 milliliters of fluid      6 Hour Severe Sepsis Bundle Completion:  1. Repeat Lactic Acid Level: Not needed due to the first pain normal  2. MAP>65 after initial IVF bolus, will continue to monitor fluid status and vital signs  I attest to having performed a repeat sepsis exam and assessment of perfusion at 2345 and the results demonstrate improved perfusion.[JW1.2]                     Medical Decision  Making:[AG1.1]  Josy Thomson is a 89 year old female here with her daughter for complaints of fever, acute onset of delirium. She is on day 5/5 of Tamiflu. Concern here is for delirium secondary to Tamiflu versus another underlying infection, pneumonia versus a urinary tract infection. Also likely quite volume depleted. Initially tachycardic, hypotensive, hypoxic. Immediately assessed. IV access obtained. Fluid rehydration begun. Portable chest x-ray done. Bedside ultrasound done. Initial blood work showing acute on chronic renal dysfunction, likely pre-renal. Blood gas surprisingly normal. CBC showing chronic pancytopenia, worse than previous, falls into the neutropenic range. Rectal examination with no gross melena or hematochezia. She was given empiric antibiotics on arrival. She follows simple commands. Will do CT scan to rule out CNS pathology such as hemorrhage. Low suspicion for CNS  Infection. Will do a CT of the abdomen as she appears distended and is tender consistently on repeat examination. Chest x-ray shows congestion versus pneumonia. Her volume status will be difficult as she is volume depleted by mucous membrane examination and IVC but has an elevated B[AG1.10]N[JW1.1]P and likely some degree of underlying cardiomyopathy.[AG1.10]    Update: Vital signs improved with crystalloid fluid resuscitation here this was done with small boluses and evaluation for response to therapy.  Bedside ultrasound showing decent contractility no significant ventricular chamber dilation.  CT scan of the head was unremarkable CT scan of the abdomen and pelvis shows chronic findings portion gallbladder there is no acute surgical emergencies or vascular catastrophes.  The portion of the lungs are visualized show bilateral pneumonia.  She was treating empirically with broad-spectrum antibiotics for neutropenia and suspected pneumonia.  Her blood tests showed pancytopenia and neutropenia as well as acute renal failure, blood  gas was surprisingly normal as was her lactate.  She will have a Farmer placed to monitor her I's and O's.  Troponin was negative.    I discussed CODE STATUS with her daughters and she is a DNR DNI.  We discussed the next specific interventions of central line which they declined and would like to do the best we can with the peripheral lines that we have they would consider a PICC line potentially to be placed tomorrow.  Currently does not need pressors as her map is greater than 65.  There is no persistent hypotension.  She received 30 cc/kg over the course of her ED stay.  She will meet criteria for severe sepsis and her time 0 would be when[JW1.1]The x-ray resulted showing pneumonia which is 2221.        Critical Care time:[AG1.1]  was 45 minutes for this patient excluding procedures.[JW1.1]    Diagnosis:[AG1.1]    ICD-10-CM    1. Pancytopenia (H) D61.818    2. MDS (myelodysplastic syndrome) (H) D46.9    3. Pneumonia of both lower lobes due to infectious organism J18.9    4. Acute delirium R41.0    5. Acute renal failure, unspecified acute renal failure type (H) N17.9[JW1.4]        Disposition:[AG1.1]  The patient is admitted into the care of Dr. Garcia.[AG1.6]    Scribe Disclosure:  I, Syed Mata, am serving as a scribe at 9:11 PM on 3/2/2018 to document services personally performed by Wilber Qureshi MD, based on my observations and the provider's statements to me.  [facility] EMERGENCY DEPARTMENT[AG1.1]       Wilber Qureshi MD  03/03/18 0103  [JW1.5]     Revision History        User Key Date/Time User Provider Type Action    > JW1.5 3/3/2018  1:03 AM Wilber Qureshi MD Physician Sign     JW1.3 3/2/2018 11:45 PM Wilber Qureshi MD Physician Share     JW1.2 3/2/2018 11:42 PM Wilber Qureshi MD Physician Share     JW1.4 3/2/2018 11:39 PM Wilber Qureshi MD Physician      JW1.1 3/2/2018 11:33 PM Wilber Qureshi MD Physician      AG1.4  3/2/2018 11:20 PM Adolfo, Syed Scribe Share     [N/A] 3/2/2018 11:16 PM Adolfo, Syed Scribe Share     [N/A] 3/2/2018 11:15 PM Adolfo, Syed Scribe Share     [N/A] 3/2/2018 11:13 PM Adolfo, Syed Scribe Share     AG1.6 3/2/2018 11:09 PM Adolfo, Syed Scribe Share     AG1.10 3/2/2018 11:03 PM Adolfo, Syed Scribe      AG1.5 3/2/2018 10:53 PM Adolfo, Syed Scribe Share     [N/A] 3/2/2018 10:44 PM Adolfo, Syed Scribe Share     [N/A] 3/2/2018 10:40 PM Adolfo, Syed Scribe Share     [N/A] 3/2/2018 10:35 PM Adolfo, Syed Scribe Share     AG1.7 3/2/2018 10:30 PM Adolfo, Syed Scribe Share     [N/A] 3/2/2018 10:27 PM Adolfo, Syed Scribe Share     AG1.9 3/2/2018 10:26 PM Adolfo, Syed Scribe Share     [N/A] 3/2/2018 10:24 PM Adolfo, Syed Scribe Share     AG1.8 3/2/2018 10:23 PM Adolfo, Syed Scribe Share     AG1.2 3/2/2018 10:10 PM Adolfo, Syed Scribe Share     [N/A] 3/2/2018  9:21 PM Adolfo, Syed Scribe Share     AG1.3 3/2/2018  9:13 PM Adolfo, Syed Scribe Share     AG1.1 3/2/2018  9:03 PM Adolfo, Syed Scribe             ED Notes by Usha Antonio RN at 3/3/2018 12:16 AM     Author:  Usha Antonio RN Service:  (none) Author Type:  Registered Nurse    Filed:  3/3/2018 12:40 AM Date of Service:  3/3/2018 12:16 AM Creation Time:  3/3/2018 12:21 AM    Status:  Addendum :  Marisela, Usha K, RN (Registered Nurse)         [facility]  ED Nurse Handoff Report    Josy Thomson is a 89 year old female   ED Chief complaint: Altered Mental Status  . ED Diagnosis:   Final diagnoses:   Pancytopenia (H)   MDS (myelodysplastic syndrome) (H)   Pneumonia of both lower lobes due to infectious organism   Acute delirium   Acute renal failure, unspecified acute renal failure type (H)   Severe sepsis (H)     Allergies: No Known Allergies    Code Status: DNR / DNI  Activity level - Baseline/Home:  Stand with Assist. Activity Level - Current:   Total Care. Lift room needed: No. Bariatric:  "No   Needed: No   Isolation: No. Infection: Not Applicable.     Vital Signs:   Vitals:    03/02/18 2300 03/02/18 2315 03/02/18 2330 03/02/18 2331   BP: (!) 87/57 115/61 106/60    Resp:       Temp:       TempSrc:       SpO2: 90% 94% 95%    Weight:    52 kg (114 lb 10.2 oz)       Cardiac Rhythm:  ,      Pain level:    Patient confused: Yes, pt confused at baseline but now able to answer questions and respond to prompts. Patient Falls Risk: Yes.   Elimination Status: Urethral catheter (calle) in place; refer to orders to discontinue as per protocol    Patient Report - Initial Complaint: Altered mental status. Focused Assessment:    21:30 Neurological Cognitive/Perceptual/Neuro - Cognitive/Neuro/Behavioral WDL:  WDL except; all Level Of Consciousness: somnolent Arousal Level: arouses to touch/gentle shaking Best Language: 2 - Severe aphasia (Pt not able to make comprehensible words, just moans, groans, and screams. ) Neurological Comment: Pts family report mental status changes the last two days, yesterday family noted that pt was fairly close to baseline, possibly more easily agitated. Today family and assisted living staff report that pt was agitated, aggressive at times, not redirectable, and would not take her medications. Pt arrives uncooperative, screaming \"OW!\" with every touch.   [place] Coma Scale - Best Eye Response: 3-->(E3) to speech Best Motor Response: 5-->(M5) localizes pain Best Verbal Response: 2-->(V2) incomprehensible speech [place] Coma Scale Score: 10 MH    21:30 Respiratory Respiratory - Respiratory WDL:  WDL except; all; cough Rhythm/Pattern, Respiratory: tachypnea (Pt on roughly day 5 of influenza tx, family noted that pt has had continued fevers, generalized weakness, increasing generalized body aches and pains. ) Lung Fields: All Fields Throughout All Lung Fields: coarse; crackles coarse (Pt sounds congested, wet, and course even without stethoscope)       23:30 ED Notes Addendum " While ICU MD in room, pt began speaking actual words with meaning. She could answer questions, speak with family, give attitude, and even laughed at one point. First time I have heard pt talk since arrival. Pts daughters asking about pain medications, apparently pt receives dilaudid at home, told them we were concerned about blood pressure and would have to discuss with MD.        Tests Performed: CXR, labs, UA, CT. Abnormal Results:[MH1.1]   Labs Ordered and Resulted from Time of ED Arrival Up to the Time of Departure from the ED   CBC WITH PLATELETS DIFFERENTIAL - Abnormal; Notable for the following:        Result Value    WBC 0.3 (*)     RBC Count 2.45 (*)     Hemoglobin 7.0 (*)     Hematocrit 21.6 (*)     Platelet Count 37 (*)     All other components within normal limits   COMPREHENSIVE METABOLIC PANEL - Abnormal; Notable for the following:     Urea Nitrogen 86 (*)     Creatinine 2.67 (*)     GFR Estimate 17 (*)     GFR Estimate If Black 20 (*)     Albumin 1.7 (*)     Protein Total 5.8 (*)     Alkaline Phosphatase 152 (*)      (*)      (*)     All other components within normal limits   ROUTINE UA WITH MICROSCOPIC - Abnormal; Notable for the following:     Urobilinogen mg/dL 4.0 (*)     Bacteria Urine Few (*)     Mucous Urine Present (*)     Hyaline Casts 5 (*)     Amorphous Crystals Moderate (*)     All other components within normal limits   NT PROBNP INPATIENT - Abnormal; Notable for the following:     N-Terminal Pro BNP Inpatient 55887 (*)     All other components within normal limits   LIPASE - Abnormal; Notable for the following:     Lipase 26 (*)     All other components within normal limits   ISTAT BASIC MET ICA HCT POCT - Abnormal; Notable for the following:     Urea Nitrogen 75 (*)     Creatinine 3.4 (*)     GFR Estimate 13 (*)     GFR Estimate If Black 15 (*)     Hemoglobin 18.7 (*)     Hematocrit - POCT 55 (*)     All other components within normal limits   TROPONIN I   PULSE OXIMETRY  NURSING   CARDIAC CONTINUOUS MONITORING   MEASURE URINE OUTPUT   PATIENT CARE ORDER   ISTAT CG4 GASES LACTATE YOSVANY NURSING POCT   ISTAT GAS OR ELECTROLYTE NURSING POCT   ISTAT  GASES LACTATE YOSVANY POCT   URINE CULTURE AEROBIC BACTERIAL   BLOOD CULTURE   BLOOD CULTURE[MH1.2]     Pt chronically low blood counts, receives transfusions every two weeks, last transfusion was 2/23.[MH1.1]    POC US ABDOMEN LIMITED   Final Result   PROCEDURE NOTE --> Emergency Bedside Ultrasound      Procedure Name: Modified RUSH exam      Preformed by: Wilber Qureshi MD      Indication - Hypotension      Probe: low frequency curvilinear probe      Windows - Hepatorenal, Splenorenal, Suprapubic, Subxyphoid, Abdominal Aortic Views, IVC, bilateral lung windows      Findings - No intraperitoneal free fluid, No pericardial effusion, No Pneumothorax, No Abdominal aortic aneurysm, <18mm, >30% collapsibility IVC, grossly normal contractility,       Impression  -hypovolemia preserved contractility no sign of obstructive shock      Images saved on ultrasound internal hard drive per protocol      Abd/pelvis CT no contrast - Stone Protocol   Final Result   IMPRESSION:   1. Bibasilar consolidation suspicious for pneumonia. There is likely   bilateral hilar adenopathy as well seen on the images that include a   portion of the lower chest.   2. There is a peripherally calcified porcelain type gallbladder with   mixed density material within it. Two low density round areas are more   prominent than on the comparison study and could represent cholesterol   gallstones. A polyp of some sort would be difficult to exclude. On   this study, there are two low-density areas in the gallbladder and   previously there was one.   3. No evidence of diverticulitis or appendicitis.      ANURADHA SERRANO MD      Head CT w/o contrast   Final Result   IMPRESSION:      1. No evidence of acute intracranial hemorrhage, mass, or herniation.   2. There is generalized atrophy of the  brain. White matter changes are   present in the cerebral hemispheres that are consistent with small   vessel ischemic disease in this age patient.         BETHANY HUANG MD      XR Chest Port 1 View   Final Result   IMPRESSION: Moderate stable cardiomegaly. Mildly prominent central   pulmonary vasculature. Increased density in the perihilar regions   bilaterally and left lower lobe. These opacities could be caused by   congestive heart failure. Cannot exclude pneumonia in the right   perihilar region or left lower lobe.      Advanced bilateral shoulder degenerative changes.      ANURADHA SERRANO MD[MH1.2]      .   Treatments provided: Iv fluids, BP management, Abx, monitoring   Family Comments: Daughters at bedside   OBS brochure/video discussed/provided to patient:  N/A  ED Medications:   Medications   vancomycin 1250 mg in 0.9% NaCl 250 mL PREMIX (1,250 mg Intravenous New Bag 3/2/18 0878)   lactated ringers BOLUS 1,000 mL (0 mLs Intravenous Stopped 3/2/18 2212)   0.9% sodium chloride BOLUS (0 mLs Intravenous Stopped 3/2/18 2211)   ceFEPIme (MAXIPIME) 2 g vial to attach to  ml bag for ADULTS or 50 ml bag for PEDS (0 g Intravenous Stopped 3/2/18 8837)   lactated ringers BOLUS 250 mL (0 mLs Intravenous Stopped 3/2/18 4095)     Drips infusing:  Yes- Vancomycin  For the majority of the shift, the patient's behavior Yellow. Interventions performed were Deescalation, pts family seems to calm her, pt also much more cooperative at this time versus arrival, be sure to explain everything you do to her before doing it.     Severe Sepsis OR Septic Shock Diagnosis Present:[MH1.1] Yes[MH1.3]    ED Nurse Name/Phone Number: Kennedy Bauer,   12:16 AM[MH1.1]  RECEIVING UNIT ED HANDOFF REVIEW    Above ED Nurse Handoff Report was reviewed: Yes  Reviewed by: Usha Antonio on March 3, 2018 at 12:40 AM[TB1.1]        Revision History        User Key Date/Time User Provider Type Action    > TB1.1 3/3/2018 12:40 AM Usha Antonio  RN Registered Nurse Addend     [N/A] 3/3/2018 12:21 AM Kennedy aBuer RN Registered Nurse Addend     MH1.3 3/3/2018 12:21 AM Kennedy Bauer RN Registered Nurse Sign     MH1.2 3/3/2018 12:18 AM Kennedy Bauer RN Registered Nurse      MH1.1 3/3/2018 12:16 AM Kennedy Bauer RN Registered Nurse             ED Notes by Kennedy Bauer RN at 3/2/2018 11:25 PM     Author:  Kennedy Bauer RN Service:  (none) Author Type:  Registered Nurse    Filed:  3/3/2018 12:33 AM Date of Service:  3/2/2018 11:25 PM Creation Time:  3/3/2018 12:33 AM    Status:  Signed :  Kennedy Bauer RN (Registered Nurse)         Pt decreased to 4L [place][MH1.1]     Revision History        User Key Date/Time User Provider Type Action    > MH1.1 3/3/2018 12:33 AM Kennedy Bauer RN Registered Nurse Sign            ED Notes by Kennedy Bauer RN at 3/2/2018 11:30 PM     Author:  Kennedy Bauer RN Service:  (none) Author Type:  Registered Nurse    Filed:  3/3/2018 12:15 AM Date of Service:  3/2/2018 11:30 PM Creation Time:  3/3/2018 12:14 AM    Status:  Addendum :  Kennedy Bauer RN (Registered Nurse)         While ICU MD in room, pt began speaking actual words with meaning. She could answer questions, speak with family, give attitude, and even laughed at one point. First time I have heard pt talk since arrival.[MH1.1] Pts daughters asking about pain medications, apparently pt receives dilaudid at home, told them we were concerned about blood pressure and would have to discuss with MD.[MH1.2]      Revision History        User Key Date/Time User Provider Type Action    > [N/A] 3/3/2018 12:15 AM Kennedy Bauer RN Registered Nurse Addend     MH1.2 3/3/2018 12:14 AM Kennedy Bauer RN Registered Nurse Sign     MH1.1 3/3/2018 12:11 AM Kennedy Bauer, RN Registered Nurse                   Procedure Notes     No notes of this type exist for this encounter.         Progress Notes -  "Therapies (Notes from 03/03/18 through 03/06/18)      Progress Notes by Stephan Tai RT at 3/3/2018  2:13 PM     Author:  Stephan Tai RT Service:  (none) Author Type:  Respiratory Therapist    Filed:  3/3/2018  4:31 PM Date of Service:  3/3/2018  2:13 PM Creation Time:  3/3/2018  4:25 PM    Status:  Signed :  Stephan Tai RT (Respiratory Therapist)         [facility]     Date: 3/3/18  Admission Dx: Pneumonia  Pulmonary History: None  Home Nebulizer/MDI Use: Albuterol TID  Home Oxygen: Unknown, Pt is poor historian.  Acuity Level (RCAT flow sheet): 2  Aerosol Therapy initiated: Changed Duoneb QID to Q4 hours, Changed albuterol Q2 hours prn to Q4 hours prn.      Pulmonary Hygiene initiated: Deep breath and cough QID.      Volume Expansion initiated: IS QID if she is able.      Current Oxygen Requirements: 3 Lpm NC  Current SpO2: 95%    Re-evaluation date: 3/6/2018    Patient Education: Informed Pt of RCAT.  She is not following commands the best at the moment and is confused.      See \"RT Assessments\" flow sheet for patient assessment scoring and Acuity Level Details.       Stephan Tai  March 3, 2018.2:13 PM[RA1.1]               Revision History        User Key Date/Time User Provider Type Action    > RA1.1 3/3/2018  4:31 PM Stephan Tai, RT Respiratory Therapist Sign            "

## 2018-03-02 NOTE — LETTER
Transition Communication Hand-off for Care Transitions to Next Level of Care Provider    Name: Josy Thomson  MRN #: [id number]  Primary Care Provider: Abeba Bradford  Primary Care MD Name: Dr. Abeba Bradford  Primary Clinic: [facility] [address] [place] [address]  Primary Care Clinic Name: [facility] [place]  Reason for Hospitalization:  Acute delirium [R41.0]  MDS (myelodysplastic syndrome) (H) [D46.9]  Pancytopenia (H) [D61.818]  Severe sepsis (H) [A41.9, R65.20]  Acute renal failure, unspecified acute renal failure type (H) [N17.9]  Pneumonia of both lower lobes due to infectious organism [J18.9]  Admit Date/Time: 3/2/2018  8:44 PM  Discharge Date: 3/6/18  Payor Source: Payor: MEDICA / Plan: MEDICA [employer] [facility] NON/FV PARTNERS / Product Type: Indemnity          Reason for Communication Hand-off Referral:  High Risk for readmit due to being a readmit w/in 7 days for this admit, PNA and Neutropenic fever.    Discharge Plan:  D/C to [facility] with [facility].     Concern for non-adherence with plan of care:   No  Discharge Needs Assessment:  Needs       Most Recent Value    Equipment Currently Used at Home grab bar, shower chair, walker, rolling, walker, standard, wheelchair, manual    Transportation Available car, family or friend will provide    Hospice [facility] [phone], Fax: [phone]    Skilled Nursing Facility [facility] [phone], Fax: [phone]          Already enrolled in Tele-monitoring program and name of program:  No  Follow-up specialty is recommended: Hospice  Follow-up plan:  No future appointments.    Any outstanding tests or procedures:    Procedures     Future Labs/Procedures    Oxygen - Nasal cannula     Comments:    3 Lpm by nasal cannula to keep O2 sats 92% or greater.          Referrals     Future Labs/Procedures    HOSPICE REFERRAL     Comments:    **Order classes of: [facility], [facility] and [facility]  Homecare will route to the Home Care and Hospice Referral Pool.  Home Care or Hospice will then contact the patient to schedule their appointment.**    Hospice eligibility overview: prognosis of 6 months or less, end stage disease, patient goals are comfort only, patient is not seeking curative treatment.    If you do not hear from Hospice, or you would like to call to schedule, please call the referring place of service that your provider has listed below.  ______________________________________________________________________    Your provider has referred you to: [facility]: [facility] [phone]   [email or web address]    Anticipated discharge date 3/6/18. Homecare to begin within 48 hours of discharge.  PLEASE Schedule Hospice consult for 24 - 48 hours.  Please call if there is need for a variance to this timeline.    REASON FOR REFERRAL: Hospice Diagnosis: End stage Myelodysplastic syndrome    ADDITIONAL SERVICES NEEDED:     OTHER PERTINENT INFORMATION: Patient was last seen by provider on 3/6/18 for daily hospital visitation.  Factors that indicate prognosis of less than 6 months:  Weight loss: severe malnutrition 2ndary to decrease po intake  Functional decline: yes  End stage disease: MDS    Current Outpatient Prescriptions:  MEDICATION INSTRUCTION, If care facility cannot accept or use ranges, facility is instructed to use lower end of dosing range, Disp: , Rfl:   HYDROmorphone, HIGH CONC, (DILAUDID) 10 mg/mL LIQD oral, Place 0.2 mLs (2 mg) under the tongue every 4 hours And 0.2-0.4 ML (2-4 mg) q 2 hours prn breakthrough pain or dyspnea with respiratory rate greater than 20., Disp: 30 mL, Rfl: 0  atropine 1 % ophthalmic solution, Take 2-4 drops by mouth, place under tongue or place inside cheek every 2 hours as needed for other (terminal respiratory secretions) Not for ophthalmic use., Disp: 5 mL, Rfl: 0  LORazepam (ATIVAN) 2 MG/ML (HIGH CONC)  solution, Take 0.25 mLs (0.5 mg) by mouth, place under tongue or insert rectally every 3 hours as needed for anxiety (restlessness), Disp: 30 mL, Rfl: 0  haloperidol (HALDOL) 2 MG/ML (HIGH CONC) solution, Take 0.25-0.5 mLs (0.5-1 mg) by mouth, place under tongue or insert rectally every 6 hours as needed for agitation (nausea), Disp: 30 mL, Rfl: 0  bisacodyl (DULCOLAX) 10 MG Suppository, Unwrap and insert 1 suppository rectally twice daily as needed for constipation., Disp: 4 suppository, Rfl: 0  acetaminophen (TYLENOL) 650 MG Suppository, Place 1 suppository (650 mg) rectally every 4 hours as needed for fever or mild pain (Do not exceed 4000 mg total acetaminophen per day.) Unwrap prior to insertion., Disp: 12 suppository, Rfl: 0  diclofenac (VOLTAREN) 1 % GEL topical gel, Apply 2 g topically 4 times daily To upper arms and shoulders, Disp: 1 Tube, Rfl: 0      Patient Active Problem List:     Chest pain     Benign hypertension     Hematochezia     Anemia     GI bleed     Advanced directives, counseling/discussion     MDS (myelodysplastic syndrome) (H)     Pancytopenia (H)     Generalized weakness     Diarrhea     HCAP (healthcare-associated pneumonia)    This patient is under my care, and I have initiated the establishment of the plan of care. This patient will be followed by a physician who will periodically review the plan of care.    Physician/Provider to provide follow up care: Abeba Bradford    Responsible [place] certified Physician at time of discharge: Jerica Olivier MD    Please be aware that coverage of these services is subject to the terms and limitations of your health insurance plan.  Call member services at your health plan with any benefit or coverage questions.            Key Recommendations:  Home safety. Daughter, Jazmine, is patient's PCA. She provides daily AM cares. Jazmine will prep meals for the day as patient cannot cook for herself; assist with hygiene cares. Patient has macular degeneration  w/very poor eye sight and very [place]. Mentation at baseline is very sharp. She has been experiencing confusion the last 2 admissions. Jazmine does all grocery shopping, pays bills, provides transportation for patient. Also of concern, patient has had multiple falls at home. She has a Life Alert but forgets to push it.  Prior to last admission (2/16/18) there was concern for caregiver fatigue. The patient was discharged to [facility] after last admission. Unclear where [facility] is in the process to obtain additional help.     Alexandra Cassidy    AVS/Discharge Summary is the source of truth; this is a helpful guide for improved communication of patient story

## 2018-03-02 NOTE — IP AVS SNAPSHOT
` `     [place] [id number] MEDICAL SURGICAL: [phone]            Medication Administration Report for Josy Thomson as of 03/06/18 1612   Legend:    Given Hold Not Given Due Canceled Entry Other Actions    Time Time (Time) Time  Time-Action       Inactive    Active    Linked        Medications 02/28/18 03/01/18 03/02/18 03/03/18 03/04/18 03/05/18 03/06/18    acetaminophen (TYLENOL) solution 500 mg  Dose: 500 mg  Freq: EVERY 4 HOURS PRN Route: PO  PRN Reasons: mild pain,fever  Start: 03/03/18 1210   Admin Instructions: Maximum acetaminophen dose from all sources= 75 mg/kg/day not to exceed 4 grams/day.                     Or  acetaminophen (TYLENOL) Suppository 650 mg  Dose: 650 mg  Freq: EVERY 4 HOURS PRN Route: RE  PRN Reasons: mild pain,fever  Start: 03/03/18 1210   Admin Instructions: Maximum acetaminophen dose from all sources = 75 mg/kg/day not to exceed 4 grams/day.        1219 (650 mg)-Given              albuterol neb solution 2.5 mg  Dose: 1 vial  Freq: EVERY 4 HOURS PRN Route: NEBULIZATION  PRN Reasons: wheezing,shortness of breath / dyspnea  Start: 03/03/18 1645              artificial saliva (BIOTENE MT) solution 1 spray  Dose: 1 spray  Freq: EVERY 1 HOUR PRN Route: MT  PRN Reason: dry mouth  Start: 03/05/18 1157              atropine 1 % ophthalmic solution 1-2 drop  Dose: 1-2 drop  Freq: EVERY 1 HOUR PRN Route: SL  PRN Reason: other  PRN Comment: secretions  Start: 03/05/18 1156              Carboxymethylcellulose Sod PF (REFRESH PLUS) 0.5 % ophthalmic solution 1 drop  Dose: 1 drop  Freq: 3 TIMES DAILY Route: Both Eyes  Start: 03/03/18 1030       (1220)-Not Given       1221 (1 drop)-Given       1632 (1 drop)-Given       (2105)-Not Given        0840 (1 drop)-Given       1521 (1 drop)-Given       2125 (1 drop)-Given        1014 (1 drop)-Given       1722 (1 drop)-Given       2119 (1 drop)-Given        0746 (1 drop)-Given              1601 (1 drop)-Given       [ ] 2200           diclofenac (VOLTAREN) 1  % topical gel 2 g  Dose: 2 g  Freq: 4 TIMES DAILY Route: Top  Start: 03/05/18 1300   Admin Instructions: Apply to shoulders and upper arms  Send dosing card with product.          (7116)-Not Given       1727 (2 g)-Given       2119 (2 g)-Given        0744 (2 g)-Given              (1332)-Not Given [C]       [ ] 1800       [ ] 2200           haloperidol (HALDOL) 2 MG/ML (HIGH CONC) solution 0.5-1 mg  Dose: 0.5-1 mg  Freq: EVERY 6 HOURS PRN Route: SL  PRN Reason: agitation  Start: 03/05/18 1156              haloperidol lactate (HALDOL) injection 2 mg  Dose: 2 mg  Freq: ONCE Route: IV  Start: 03/03/18 0800   Admin Instructions: For ordered doses up to 5 mg, give IV Push undiluted over 1 minute.        0922-Hold              HYDROmorphone (DILAUDID) (HIGH CONC) oral solution 2 mg  Dose: 2 mg  Freq: EVERY 4 HOURS Route: SL  Start: 03/06/18 1400   Admin Instructions: Injectable use ORALLY. CAUTION: Special high concentration formulation. Verify dose and volume before administration           1342-Hold       [ ] 1800       [ ] 2200          And  HYDROmorphone (DILAUDID) (HIGH CONC) oral solution 2-4 mg  Dose: 2-4 mg  Freq: EVERY 2 HOURS PRN Route: SL  PRN Reasons: moderate to severe pain,breakthrough pain,other  PRN Comment: dyspnea with respiratory rate greater than 20  Start: 03/06/18 1041   Admin Instructions: Use sublingual route  Injectable use ORALLY. CAUTION: Special high concentration formulation. Verify dose and volume before administration           1140 (2 mg)-Given       1601 (2 mg)-Given [C]           HYDROmorphone (PF) (DILAUDID) injection 0.3-0.5 mg  Dose: 0.3-0.5 mg  Freq: EVERY 2 HOURS PRN Route: IV  PRN Reason: other  PRN Comment: breakthrough pain - If Sublingual scheduled and prn not effective - use sublingual first.  Start: 03/05/18 1149   Admin Instructions: For ordered doses up to 4 mg give IV Push undiluted. Administer each 2mg over 2-5 minutes.               lidocaine (LMX4) kit  Freq: ONCE PRN  "Route: Top  PRN Reason: mild pain  PRN Comment: with VAD insertion or accessing implanted port,  Start: 03/05/18 1153   Admin Instructions: Do NOT give if patient has a history of allergy to any local anesthetic or any \"davide\" product.   Apply 30 min prior to VAD insertion or port access.  MAX Dose:  2.5 gm (  of 5 gm tube)                 lidocaine 1 % 1 mL  Dose: 1 mL  Freq: ONCE PRN Route: OTHER  PRN Comment: mild pain with VAD insertion or accessing implanted port,  Start: 03/05/18 1153   Admin Instructions: Do NOT give if patient has a history of allergy to any local anesthetic or any \"davide\" product. MAX dose 1 mL subcutaneous OR intradermal in divided doses.               LORazepam (ATIVAN) 1 mg/0.5 mL (HIGH CONC) solution 0.5-1 mg  Dose: 0.5-1 mg  Freq: EVERY 3 HOURS PRN Route: SL  PRN Reasons: anxiety,seizures,nausea  Start: 03/05/18 1155              naloxone (NARCAN) injection 0.1-0.4 mg  Dose: 0.1-0.4 mg  Freq: EVERY 2 MIN PRN Route: IV  PRN Reason: opioid reversal  Start: 03/05/18 1153   Admin Instructions: Try to minimize reversal of analgesia especially in end-of-life patients.  For ordered doses up to 2mg give IVP. Give each 0.4mg over 15 seconds in emergency situations. For non-emergent situations further dilute in 9mL of NS to facilitate titration of response.               nitroGLYcerin (NITROSTAT) sublingual tablet 0.4 mg  Dose: 0.4 mg  Freq: EVERY 5 MIN PRN Route: SL  PRN Reason: chest pain  Start: 03/03/18 0124   Admin Instructions: Maximum 3 doses in 15 minutes.  Notify provider if no relief after 3 doses.    Do NOT give nitroglycerin SL IF the patient has taken avanafil (STENDRA), sildenafil (VIAGRA) or (REVATIO) within the last 8 hours, vardenafil (LEVITRA) or (STAXYN) within the last 18 hours, tadalafil (CIALIS) or (ADCIRCA) within the last 36 hours. Inform provider if patient has taken one of these medications.  If patient is still having acute angina requiring treatment, an alternative " treatment option may be used such as: IV beta-blocker [2.5 mg - 5 mg metoprolol (LOPRESSOR)] if ordered by a provider.              Future Medications  Medications 02/28/18 03/01/18 03/02/18 03/03/18 03/04/18 03/05/18 03/06/18       bisacodyl (DULCOLAX) Suppository 10 mg  Dose: 10 mg  Freq: ONCE PRN Route: RE  PRN Reason: other  PRN Comment: for no bowel movement for 72 hours  Start: 03/08/18 0000   Admin Instructions: Give only after rectal check for impacted stool.              Completed Medications  Medications 02/28/18 03/01/18 03/02/18 03/03/18 03/04/18 03/05/18 03/06/18         Dose: 0.25 mg  Freq: ONCE Route: IV  Start: 03/04/18 0230   End: 03/04/18 0257   Admin Instructions: For IV PUSH: Dilute with equal volume of NS. For ordered doses up to 4 mg give IV Push. Administer each 2mg over 1-5 minutes.         0257 (0.25 mg)-Given               Dose: 1,500 mg  Freq: ONCE Route: IV  Indications of Use: FEBRILE NEUTROPENIA,HEALTHCARE-ASSOCIATED PNEUMONIA  Last Dose: 1,500 mg (03/04/18 1424)  Start: 03/04/18 1330   End: 03/04/18 1554   Admin Instructions: Vesicant.         1424 (1,500 mg)-New Bag            Discontinued Medications  Medications 02/28/18 03/01/18 03/02/18 03/03/18 03/04/18 03/05/18 03/06/18         Dose: 1 vial  Freq: EVERY 2 HOURS PRN Route: NEBULIZATION  PRN Reasons: wheezing,shortness of breath / dyspnea  Start: 03/03/18 1018   End: 03/03/18 1632       1413 (2.5 mg)-Given       1632-Med Discontinued            Dose: 2,000 mg  Freq: EVERY 24 HOURS Route: IV  Indications of Use: HEALTHCARE-ASSOCIATED PNEUMONIA  Start: 03/03/18 2200   End: 03/04/18 1622   Admin Instructions: Dose adjusted per renal dosing policy.  Estimated CrCl = 10-29 mL/min. <br>        2109 (2,000 mg)-New Bag        1622-Med Discontinued           Dose: 2,000 mg  Freq: EVERY 24 HOURS Route: IV  Indications of Use: HEALTHCARE-ASSOCIATED PNEUMONIA  Start: 03/04/18 2200   End: 03/05/18 1122   Admin Instructions: Dose adjusted per  renal dosing policy.  Estimated CrCl = 10-29 mL/min.            2125 (2,000 mg)-New Bag        1122-Med Discontinued          Rate: 75 mL/hr   Freq: CONTINUOUS Route: IV  Start: 03/04/18 1430   End: 03/05/18 1123   Admin Instructions: Start after d5 ns fluids is completed         1424 ( )-New Bag       1537 ( )-Rate/Dose Verify        0958 ( )-New Bag       1123-Med Discontinued          Rate: 75 mL/hr   Freq: CONTINUOUS Route: IV  Last Dose: Stopped (03/04/18 1424)  Start: 03/03/18 0245   End: 03/04/18 1400       0257 ( )-New Bag       1222 ( )-New Bag       1843 ( )-Rate/Dose Verify        0011 ( )-Rate/Dose Verify       0436 ( )-New Bag       0804 ( )-Rate/Dose Verify       1400-Med Discontinued  1424-Stopped               Dose: 2 mg  Freq: EVERY 4 HOURS WHILE AWAKE Route: SL  Start: 03/05/18 1400   End: 03/06/18 1017   Admin Instructions: Injectable use ORALLY. CAUTION: Special high concentration formulation. Verify dose and volume before administration          1320 (2 mg)-Given       1722 (2 mg)-Given       2119 (2 mg)-Given        0620 (2 mg)-Given       1014 (2 mg)-Given [C]       1017-Med Discontinued      And    Dose: 2-4 mg  Freq: EVERY 4 HOURS Route: SL  Start: 03/06/18 1400   End: 03/06/18 1042   Admin Instructions: Injectable use ORALLY. CAUTION: Special high concentration formulation. Verify dose and volume before administration           1042-Med Discontinued      And    Dose: 2 mg  Freq: EVERY 2 HOURS PRN Route: SL  PRN Reasons: moderate to severe pain,breakthrough pain,other  PRN Comment: dyspnea  Start: 03/05/18 1158   End: 03/06/18 1017   Admin Instructions: Use sublingual route  Injectable use ORALLY. CAUTION: Special high concentration formulation. Verify dose and volume before administration           0825 (2 mg)-Given       1017-Med Discontinued      And    Dose: 2 mg  Freq: EVERY 2 HOURS PRN Route: SL  PRN Reasons: moderate to severe pain,breakthrough pain,other  PRN Comment: dyspnea  Start:  03/06/18 1016   End: 03/06/18 1042   Admin Instructions: Use sublingual route  Injectable use ORALLY. CAUTION: Special high concentration formulation. Verify dose and volume before administration           1042-Med Discontinued         Dose: 2 mg  Freq: EVERY 2 HOURS PRN Route: SL  PRN Reason: moderate to severe pain  Start: 03/05/18 1148   End: 03/05/18 1159   Admin Instructions: Injectable use ORALLY. CAUTION: Special high concentration formulation. Verify dose and volume before administration          1159-Med Discontinued       Or    Dose: 2 mg  Freq: EVERY 2 HOURS PRN Route: SL  PRN Reasons: moderate to severe pain,other  PRN Comment: dyspnea - respirations greater than 20 per minute  Start: 03/05/18 1148   End: 03/05/18 1159   Admin Instructions: Injectable use ORALLY. CAUTION: Special high concentration formulation. Verify dose and volume before administration          1159-Med Discontinued          Dose: 2 mg  Freq: 3 TIMES DAILY Route: PO  Start: 03/04/18 1600   End: 03/05/18 1152        1526 (2 mg)-Given       2124 (2 mg)-Given        (1039)-Not Given [C]       1152-Med Discontinued          Start: 03/03/18 0953   End: 03/03/18 2159   Admin Instructions: Cassandra Godinez : cabinet override  For ordered doses up to 4 mg give IV Push undiluted. Administer each 2mg over 2-5 minutes.               2159-Med Discontinued            Dose: 0.3 mg  Freq: 3 TIMES DAILY Route: IV  Start: 03/03/18 1030   End: 03/04/18 1433   Admin Instructions: For ordered doses up to 4 mg give IV Push undiluted. Administer each 2mg over 2-5 minutes.        (1141)-Not Given [C]       1626 (0.3 mg)-Given        0006 (0.3 mg)-Given       0840 (0.3 mg)-Given       1433-Med Discontinued           Dose: 3 mL  Freq: EVERY 4 HOURS Route: NEBULIZATION  Start: 03/03/18 2000   End: 03/05/18 1122       1932 (3 mL)-Given        0000 (3 mL)-Given       0425 (3 mL)-Given       0809 (3 mL)-Given       1217 (3 mL)-Given       1702 (3 mL)-Given      "  1932 (3 mL)-Given       2338 (3 mL)-Given        0333 (3 mL)-Given       0819 (3 mL)-Given       1122-Med Discontinued          Dose: 3 mL  Freq: 4 TIMES DAILY Route: NEBULIZATION  Start: 03/03/18 1200   End: 03/03/18 1632       (1412)-Not Given [C]       1522 (3 mL)-Given       1632-Med Discontinued            Freq: EVERY 1 HOUR PRN Route: Top  PRN Reason: pain  PRN Comment: with VAD insertion or accessing implanted port.  Start: 03/03/18 0124   End: 03/05/18 1438   Admin Instructions: Do NOT give if patient has a history of allergy to any local anesthetic or any \"davide\" product.   Apply 30 minutes prior to VAD insertion or port access.  MAX Dose:  2.5 g (  of 5 g tube)          1438-Med Discontinued          Dose: 1 mL  Freq: EVERY 1 HOUR PRN Route: OTHER  PRN Comment: mild pain with VAD insertion or accessing implanted port  Start: 03/03/18 0124   End: 03/05/18 1438   Admin Instructions: Do NOT give if patient has a history of allergy to any local anesthetic or any \"davide\" product. MAX dose 1 mL subcutaneous OR intradermal in divided doses.          1438-Med Discontinued          Dose: 0.1-0.4 mg  Freq: EVERY 2 MIN PRN Route: IV  PRN Reason: opioid reversal  Start: 03/03/18 1034   End: 03/05/18 1152   Admin Instructions: For respiratory rate LESS than or EQUAL to 8.  Partial reversal dose:  0.1 mg titrated q 2 minutes for Analgesia Side Effects Monitoring Sedation Level of 3 (frequently drowsy, arousable, drifts to sleep during conversation).Full reversal dose:  0.4 mg bolus for Analgesia Side Effects Monitoring Sedation Level of 4 (somnolent, minimal or no response to stimulation).  For ordered doses up to 2mg give IVP. Give each 0.4mg over 15 seconds in emergency situations. For non-emergent situations further dilute in 9mL of NS to facilitate titration of response.          1152-Med Discontinued          Dose: 12.5 mg  Freq: AT BEDTIME Route: PO  Start: 03/04/18 2200   End: 03/05/18 1122 2124 (12.5 " mg)-Given        1122-Med Discontinued          Dose: 12.5 mg  Freq: ONCE Route: PO  Start: 03/04/18 0245   End: 03/04/18 1401        (0302)-Not Given       1401-Med Discontinued           Dose: 3 mL  Freq: EVERY 8 HOURS Route: IV  Start: 03/05/18 1200   End: 03/05/18 1200   Admin Instructions: And Q1H PRN, to lock peripheral IV dormant line.                   1200-Med Discontinued          Dose: 3 mL  Freq: EVERY 1 HOUR PRN Route: IV  PRN Reasons: line flush,post meds or blood draw  Start: 03/05/18 1153   End: 03/05/18 1200   Admin Instructions: for peripheral IV flush post IV meds          1200-Med Discontinued          Dose: 3 mL  Freq: EVERY 8 HOURS Route: IK  Start: 03/03/18 0124   End: 03/05/18 1200   Admin Instructions: And Q1H PRN, to lock peripheral IV dormant line.        0206 (3 mL)-Given       (0922)-Not Given       (1632)-Not Given        0113 (3 mL)-Given       0840 (3 mL)-Given       (1633)-Not Given        (0040)-Not Given       (0955)-Not Given       1200-Med Discontinued          Dose: 3 mL  Freq: EVERY 1 HOUR PRN Route: IK  PRN Reason: line flush  PRN Comment: for peripheral IV flush post IV meds  Start: 03/03/18 0124   End: 03/05/18 1200         1200-Med Discontinued          Rate: 100 mL/hr   Freq: CONTINUOUS Route: IV  Last Dose: Stopped (03/03/18 0257)  Start: 03/03/18 0130   End: 03/03/18 1840       0203 ( )-New Bag       0257-Stopped       1840-Med Discontinued            Dose: 1,250 mg  Freq: ONCE Route: IV  Indications of Use: FEBRILE NEUTROPENIA  Start: 03/04/18 1330   End: 03/04/18 1322   Admin Instructions: For an adult with peripheral catheter and dose of 2-2.5 g, infuse over 2 hours.  Vesicant.         1322-Med Discontinued           Dose: 1 each  Freq: SEE ADMIN INSTRUCTIONS Route: XX  Indications of Use: HEALTHCARE-ASSOCIATED PNEUMONIA  Start: 03/03/18 0222   End: 03/05/18 1122   Admin Instructions: This order is meant to notify providers that this patient is receiving intermittent  doses of vancomycin. Do NOT chart on this order.          1122-Med Discontinued     Medications 02/28/18 03/01/18 03/02/18 03/03/18 03/04/18 03/05/18 03/06/18

## 2018-03-03 PROBLEM — J18.9 HCAP (HEALTHCARE-ASSOCIATED PNEUMONIA): Status: ACTIVE | Noted: 2018-01-01

## 2018-03-03 NOTE — PROVIDER NOTIFICATION
BG 75. Patient is NPO and refusing oral intake. Did you want to add any orders for this patient?  MD notified.

## 2018-03-03 NOTE — PLAN OF CARE
Problem: Patient Care Overview  Goal: Plan of Care/Patient Progress Review  Outcome: No Change  On 4 L of O2 with sats at 94-08%. Temp max of 100.7. Rectal tylenol given X1. Temp trending down. Patient is confused and Disoriented X3. Screams out for help at times. Stated she had a neck ache. IV Dilaudid 0.5 given X1. Patient has been sleeping most of the shift after receiving pain med. Easily arousable. Sats remain stable. Audible coarse crackles throughout lungs. Started on nebs. Respirations elevated at times, likely related to anxiety. Hgb 6.6. WBC 0.5 Plts- 31. 1 unit of blood transfusing. Tolerating blood well. Placed on neutropenic precautions. Remains NPO. Oral cares done. Turned and repositioned q2h. Transfers with lift. Discharge in >2 days.

## 2018-03-03 NOTE — PHARMACY-ADMISSION MEDICATION HISTORY
Admission medication history interview status for this patient is complete. See [facility] admission navigator for allergy information, prior to admission medications and immunization status.     Medication history interview source(s):none  Medication history resources (including written lists, pill bottles, clinic record):MAR ([facility]) [phone]    Changes made to PTA medication list:  Added: tamiflu, polytrim opth, probiotic, systane eye drops, levaquin, dilaudid, albuterol neb  Deleted: none  Changed: APAP    Actions taken by pharmacist (provider contacted, etc):None     Additional medication history information: all info taken from MAR    Medication reconciliation/reorder completed by provider prior to medication history? No    For patients on insulin therapy: no (Yes/No)   Lantus/levemir/NPH/Mix 70/30 dose: ___ in AM/PM or twice daily   Sliding scale Novolog Y/N   If Yes, do you have a baseline novolog pre-meal dose: ______units with meals   Patients eat three meals a day: Y/N ---  How many episodes of hypoglycemia (low blood glucose) do you have weekly: ---   How many missed doses do you have a week: ---  How many times do you check your blood glucose per day: ---  Any Barriers to therapy: cost of medications/comfortable with giving injections (if applicable)/ comfortable and confident with current diabetes regimen ---      Prior to Admission medications    Medication Sig Last Dose Taking? Auth Provider   albuterol (2.5 MG/3ML) 0.083% neb solution Take 1 vial by nebulization every 6 hours as needed for shortness of breath / dyspnea or wheezing  Yes Unknown, Entered By History   albuterol (2.5 MG/3ML) 0.083% neb solution Take 1 vial by nebulization 3 times daily 3/2/2018 at 1800 Yes Unknown, Entered By History   HYDROmorphone HCl (DILAUDID PO) Take 2 mg by mouth nightly as needed (break-through pain at night)  Yes Unknown, Entered By History   HYDROmorphone HCl (DILAUDID PO) Take 2 mg by mouth 3  times daily 3/2/2018 at 1800 Yes Unknown, Entered By History   LevoFLOXacin (LEVAQUIN PO) Take 250 mg by mouth daily For 5 days, last dose on 3-5-18 3/2/2018 at 1200 Yes Unknown, Entered By History   trimethoprim-polymyxin b (POLYTRIM) ophthalmic solution Place 1 drop Into the left eye 4 times daily For 7 days (llast day 3-5-18) 3/2/2018 at 2000 Yes Unknown, Entered By History   saccharomyces boulardii (FLORASTOR) 250 MG capsule Take 250 mg by mouth daily For 7 days (last day 3-8-18) 3/2/2018 at am Yes Unknown, Entered By History   propylene glycol (SYSTANE BALANCE) 0.6 % SOLN ophthalmic solution Place 1 drop into both eyes 3 times daily 3/2/2018 at 1600 Yes Unknown, Entered By History   Oseltamivir Phosphate (TAMIFLU PO) Take 30 mg by mouth 2 times daily For 5 days (last day 3-3-18) 3/2/2018 at pm Yes Unknown, Entered By History   Acetaminophen (TYLENOL PO) Take 500 mg by mouth every 4 hours as needed for mild pain or fever 3/1/2018 at Unknown time Yes Unknown, Entered By History   Multiple Vitamins-Minerals (OCUVITE PO) Take 1 tablet by mouth 2 times daily 3/2/2018 at 2nd-pm Yes Unknown, Entered By History   cyanocobalamin (VITAMIN B12) 1000 MCG/ML injection Inject 1 mL into the muscle every 30 days due 3-29-18 at Unknown time Yes Reported, Patient   B Complex Vitamins (B COMPLEX 1 PO) Take 1 tablet by mouth daily  3/2/2018 at am Yes Reported, Patient   venlafaxine (EFFEXOR XR) 75 MG 24 hr capsule Take 75 mg by mouth daily.   3/2/2018 at am Yes Unknown, Entered By History   omeprazole (PRILOSEC) 20 MG capsule Take 40 mg by mouth daily  3/2/2018 at am Yes Unknown, Entered By History   metoprolol (TOPROL-XL) 100 MG 24 hr tablet Take 100 mg by mouth daily. 3/2/2018 at am Yes Unknown, Entered By History   calcium carb 1250 mg, 500 mg [place],/vitamin D 200 units (OSCAL WITH D) 500-200 MG-UNIT per tablet Take 2 tablets by mouth daily  3/2/2018 at am Yes Unknown, Entered By History   deferoxamine Inject 2,000 mg into the  vein Every 2 weeks With blood transfusions   Reported, Patient   DARBEPOETIN KEVIN-POLYSORBATE IJ Inject 4 mLs as directed Every other week   Reported, Patient

## 2018-03-03 NOTE — H&P
[facility]    History and Physical  Hospitalist       Date of Admission:  3/2/2018  Date of Service (when I saw the patient): 03/02/18    Assessment & Plan   Josy Thomson is a 89 year old female patient with past medical history of myelodysplastic disorder, anemia, hypertension, arthritis, recent influenza infection, was brought from transitional care unit for evaluation for change in mental status.  History was obtained from patient's daughters.  Patient was febrile and tachycardic.  Laboratory workup showed WBC 0.3, creatinine 3.4.  CT of the abdomen/pelvis showed bibasilar consolidation suspicious for pneumonia.  She was given IV vancomycin and cefepime.  She was admitted to [facility] for further management.    1.  Healthcare associated pneumonia/neutropenic fever.  --Here WBC 0.3, ANC was not calculated.  CT imaging is suspicious for bibasilar pneumonia.  Will repeat given a dose of cefepime and vancomycin.  Will also continue IV fluid.  Will continue cefepime and vancomycin for broad coverage.  Blood culture collected in the emergency room.    2.  Acute toxic/infectious encephalopathy secondary to above: Patient has confusion and mild agitation.  Per family, her symptoms improving after she came to emergency room.  We will continue IV fluid and IV antibiotic as above.    3.  Pancytopenia with history of myelodysplastic disorder: Patient was recently admitted to this facility and received 2 units of PRBC during her hospital course.  Will monitor hemoglobin and transfuse if hemoglobin less than 7.0.  She follows with [facility].  Will consult hematology for recommendations due to her neutropenic fever.    4.  Acute kidney injury, likely due to decreased intake, sepsis: She was given IV fluid bolus in the ER.  We will put her on normal saline at 100 mL/h.  Will monitor renal function.    We will admit patient to [facility] for further management.  Plan was discussed with her daughters.     DVT Prophylaxis: Pneumatic  Compression Devices    Code Status: DNR / DNI.  Confirmed with patient's daughters    Disposition: Expected discharge: Anticipate more than 2 nights of hospital course until pneumonia improves    Vladimir Garcia MD    Primary Care Physician   Abeba Bradford    Chief Complaint   Change in mental status    History is obtained from the patient    History of Present Illness   Josy Thomson is a 89 year old female patient with past medical history of myelodysplastic disorder, anemia, hypertension, arthritis, recent admission to this facility for dehydration, was brought from transitional care unit for evaluation for change in mental status.  History was obtained from patient's daughters.  Patient was admitted to this facility from 02/16 to 02/24 for generalized weakness with dehydration, diarrhea, encephalopathy,and decreased intake.  Patient was given IV hydration.  She was also transfused with 2 units of PRBC.  She was transferred to TCU due to marked deconditioning.  At TCU, she had influenza infection and was started on Tamiflu 5 days ago.  She completed Tamiflu dose today.  Per family, patient was having confusion and agitation since last evening.  Her symptoms got worse today. She also had fever.  She was brought to emergency room for evaluation.  In the ER,her vital signs showed temperature 98.4, heart rate 111, blood pressure 100/65, oxygen saturation 93% on room air.  Laboratory tests showed sodium 136, potassium 4.5, creatinine 3.4, lactic acid 1.1.  WBC 0.3, hemoglobin 18.7.  BNP 93714, troponin less than 0.015.  CT of the head without contrast is negative for acute intracranial abnormality. Chest x-ray showed increased density in the perihilar regions bilaterally and left lower lobe.  CT of the abdomen/pelvis showed bibasilar consolidation suspicious for pneumonia.  She was given IV vancomycin cefepime emergency room.  She was also given IV fluid.  She was admitted to [facility] for further management.    Past  Medical History    I have reviewed this patient's medical history and updated it with pertinent information if needed.   Past Medical History:   Diagnosis Date     Anemia      Arthritis      History of blood transfusion      History of pneumonia      Hypertension      Macular degeneration      Tachycardia        Past Surgical History   I have reviewed this patient's surgical history and updated it with pertinent information if needed.  Past Surgical History:   Procedure Laterality Date     BONE MARROW BIOPSY, BONE SPECIMEN, NEEDLE/TROCAR Right 10/12/2015    Procedure: BIOPSY BONE MARROW;  Surgeon: David Mercado MD;  Location: [facility] OR     ENT SURGERY       ORTHOPEDIC SURGERY  2006    right shoulder pinning       Prior to Admission Medications   Prior to Admission Medications   Prescriptions Last Dose Informant Patient Reported? Taking?   B Complex Vitamins (B COMPLEX 1 PO)   Yes No   Sig: Take 1 tablet by mouth daily    DARBEPOETIN KEVIN-POLYSORBATE IJ   Yes No   Sig: Inject 4 mLs as directed Every other week   Multiple Vitamins-Minerals (OCUVITE PO)   Yes No   Sig: Take 1 tablet by mouth 2 times daily   acetaminophen (TYLENOL) 325 MG tablet   No No   Sig: Take 2 tablets by mouth every 4 hours as needed.   calcium carb 1250 mg, 500 mg [place],/vitamin D 200 units (OSCAL WITH D) 500-200 MG-UNIT per tablet  Other Yes No   Sig: Take 2 tablets by mouth daily    cyanocobalamin (VITAMIN B12) 1000 MCG/ML injection   Yes No   Sig: Inject 1 mL into the muscle every 30 days   deferoxamine   Yes No   Sig: Inject 2,000 mg into the vein Every 2 weeks With blood transfusions   metoprolol (TOPROL-XL) 100 MG 24 hr tablet  Other Yes No   Sig: Take 100 mg by mouth daily.   omeprazole (PRILOSEC) 20 MG capsule  Other Yes No   Sig: Take 40 mg by mouth daily    venlafaxine (EFFEXOR XR) 75 MG 24 hr capsule   Yes No   Sig: Take 75 mg by mouth daily.        Facility-Administered Medications: None     Allergies   No Known  Allergies    Social History   I have reviewed this patient's social history and updated it with pertinent information if needed. Josy Thomson  reports that she has never smoked. She has never used smokeless tobacco. She reports that she does not drink alcohol or use illicit drugs.    Family History   I have reviewed this patient's family history and updated it with pertinent information if needed.     Review of Systems   The 10 point Review of Systems is negative other than noted in the HPI or here.  Change in mental status    Physical Exam   Temp: 98.4  F (36.9  C) Temp src: Axillary BP: 106/60   Heart Rate: 101 Resp: 20 SpO2: 95 %      Vital Signs with Ranges  Temp:  [98.4  F (36.9  C)] 98.4  F (36.9  C)  Heart Rate:  [] 101  Resp:  [20] 20  BP: ()/(54-80) 106/60  SpO2:  [90 %-95 %] 95 %  114 lbs 10.23 oz    GEN: Confused, mildly agitated, appears comfortable, NAD.  HEENT:  Normocephalic/atraumatic, no scleral icterus, no nasal discharge, mouth moist.  CV:  Regular rate and rhythm, no murmur or JVD.  S1 + S2 noted, no S3 or S4.  LUNGS:  Clear to auscultation bilaterally without rales/rhonchi/wheezing/retractions.  Symmetric chest rise on inhalation noted.  ABD:  Active bowel sounds, soft, non-tender/non-distended.  No rebound/guarding/rigidity.  EXT:  No edema or cyanosis.  Hands/feet warm to touch with good signs of peripheral perfusion.  No joint synovitis noted.  SKIN:  Dry to touch, no exanthems noted in the visualized areas.  NEURO:  Symmetric muscle strength, sensation to touch grossly intact.  No new focal deficits appreciated.    Data   Data reviewed today: CT of the abdomen/pelvis showed evidence of bibasilar consolidation suspicious for pneumonia.     Recent Results (from the past 24 hour(s))   POC US ABDOMEN LIMITED    Impression    PROCEDURE NOTE --> Emergency Bedside Ultrasound    Procedure Name: Modified RUSH exam    Preformed by: Wilber Qureshi MD    Indication - Hypotension    Probe:  low frequency curvilinear probe    Windows - Hepatorenal, Splenorenal, Suprapubic, Subxyphoid, Abdominal Aortic Views, IVC, bilateral lung windows    Findings - No intraperitoneal free fluid, No pericardial effusion, No Pneumothorax, No Abdominal aortic aneurysm, <18mm, >30% collapsibility IVC, grossly normal contractility,     Impression  -hypovolemia preserved contractility no sign of obstructive shock    Images saved on ultrasound internal hard drive per protocol   XR Chest Port 1 View    Narrative    PORTABLE CHEST ONE VIEW   3/2/2018  9:36 PM     HISTORY: Fever.    COMPARISON: 2/17/2018 chest x-ray.      Impression    IMPRESSION: Moderate stable cardiomegaly. Mildly prominent central  pulmonary vasculature. Increased density in the perihilar regions  bilaterally and left lower lobe. These opacities could be caused by  congestive heart failure. Cannot exclude pneumonia in the right  perihilar region or left lower lobe.    Advanced bilateral shoulder degenerative changes.    ANURADHA SERRANO MD   Abd/pelvis CT no contrast - [facility] Protocol    Narrative    CT ABDOMEN AND PELVIS WITHOUT CONTRAST   3/2/2018 10:37 PM     HISTORY: Abdominal pain, fever, altered mental status.    TECHNIQUE: No IV contrast material. Radiation dose for this scan was  reduced using automated exposure control, adjustment of the mA and/or  kV according to patient size, or iterative reconstruction technique.    COMPARISON: CT scan from 6/15/2012.    FINDINGS: Scans through the lung bases demonstrate bibasilar  consolidation suspicious for pneumonia. I suspect there is bilateral  hilar adenopathy as well.    The noncontrast appearance of the liver is normal. There appears to be  a peripherally calcified porcelain type gallbladder with variable  density within it likely sludge or cholesterol gallstones. Polyps not  excluded. The low-density areas within the gallbladder are more  prominent than on the comparison study. Spleen is atrophic.  Pancreas  is atrophic but otherwise unremarkable. No adrenal lesions. No kidney  stones or hydronephrosis. No contour deforming renal masses. There is  likely a left mid pole renal cortical cyst    No evidence of abdominal aortic aneurysm or retroperitoneal  adenopathy.    Scans through the pelvis demonstrate calcified uterine fibroid.  Bladder has a normal appearance. No evidence of diverticulitis or  appendicitis. No evidence of bowel obstruction. No free air or free  fluid.      Impression    IMPRESSION:  1. Bibasilar consolidation suspicious for pneumonia. There is likely  bilateral hilar adenopathy as well seen on the images that include a  portion of the lower chest.  2. There is a peripherally calcified porcelain type gallbladder with  mixed density material within it. Two low density round areas are more  prominent than on the comparison study and could represent cholesterol  gallstones. A polyp of some sort would be difficult to exclude. On  this study, there are two low-density areas in the gallbladder and  previously there was one.  3. No evidence of diverticulitis or appendicitis.    ANURADHA SERRANO MD   Head CT w/o contrast    Narrative    CT SCAN OF THE HEAD WITHOUT CONTRAST   3/2/2018 10:38 PM     HISTORY: Agitation, acute altered mental status.      TECHNIQUE:  Axial images of the head and coronal reformations without  IV contrast material. Radiation dose for this scan was reduced using  automated exposure control, adjustment of the mA and/or kV according  to patient size, or iterative reconstruction technique.    COMPARISON: 2/16/2018.    FINDINGS: There is no evidence of intracranial hemorrhage, mass, acute  infarct or anomaly. There is generalized atrophy of the brain. There  is low attenuation in the white matter of the cerebral hemispheres  consistent with sequelae of small vessel ischemic disease.     The visualized portions of the sinuses and mastoids appear normal. The  bony calvarium and bones  of the skull base appear intact. Bilateral  pseudophakia.      Impression    IMPRESSION:     1. No evidence of acute intracranial hemorrhage, mass, or herniation.  2. There is generalized atrophy of the brain. White matter changes are  present in the cerebral hemispheres that are consistent with small  vessel ischemic disease in this age patient.      BETHANY HUANG MD

## 2018-03-03 NOTE — ED NOTES
While ICU MD in room, pt began speaking actual words with meaning. She could answer questions, speak with family, give attitude, and even laughed at one point. First time I have heard pt talk since arrival. Pts daughters asking about pain medications, apparently pt receives dilaudid at home, told them we were concerned about blood pressure and would have to discuss with MD.

## 2018-03-03 NOTE — CONSULTS
Care Transition Initial Assessment - RN    Reason For Consult: care coordination/care conference, community resources, discharge planning   Information obtained through chart review.  DATA   Active Problems:    HCAP (healthcare-associated pneumonia)       Cognitive Status: awake and confused.  [facility]  Primary Care MD Name: Dr. Abeba Bradford  Contact information and PCP information verified: Yes  Lives With: alone  Living Arrangements: house     Description of Support System: Supportive, Involved   Who is your support system?: Children, Facility resident(s)/Staff   Support Assessment: Lacks necessary supervision and assistance, Lacks adequate physical care, Caregiver experiencing high stress, Caregiver difficulty providing physical care/safety   Insurance concerns: No Insurance issues identified  ASSESSMENT  Patient currently receives the following services:  When at home, daughter Jazmine, is PCA. Not other services. Patient has [facility] [phone].         Identified issues/concerns regarding health management: Home safety. Daughter, Jazmine, is patient's PCA. She provides daily AM cares. Jazmine will prep meals for the day as patient cannot cook for herself; assist with hygiene cares. Patient has macular degeneration w/very poor eye sight and very [place]. Mentation at baseline is very sharp. She has been experiencing confusion the last 2 admissions. Jazmine does all grocery shopping, pays bills, provides transportation for patient. Also of concern, patient has had multiple falls at home. She has a Life Alert but forgets to push it.  Prior to last admission (2/16/18) there was concern for caregiver fatigue. The patient was discharged to [facility] after last admission. Unclear where [facility] is in the process to obtain additional help.   PLAN  Financial costs for the patient were not discussed.  Patient given options and choices for discharge.  Patient/family is  agreeable to the plan?  Yes  Patient anticipates discharging back to TCU pending family care conference.      Patient anticipates needs for home equipment: No  Discharge Planner   Discharge Plans in progress: Yes  Barriers to discharge plan: IV antibiotics  Plan/Disposition: TCU pending Pall[facility] recommendations.   Appointments: TBD    CM will continue to follow patient until discharge for any additional needs.     Glo Potter RN, BSN, CTS  [facility]  [phone]

## 2018-03-03 NOTE — ED NOTES
Bed: ED03  Expected date: 3/2/18  Expected time: 8:31 PM  Means of arrival: Ambulance  Comments:  isidoro Klein

## 2018-03-03 NOTE — CONSULTS
[facility]    Oncology Consultation     Date of Admission:  3/2/2018  Date of Consult (When I saw the patient): 03/03/18    Assessment & Plan   Joys Thomson is a 89 year old female who was admitted on 3/2/2018. I was asked to see the patient for myelodysplasia.      Myelodysplasia  -followed by Dr. 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, 5q-, low risk cytogenetics, <5%blasts  -has since received erythropoietin and supportive transfusions  -has declined lenalidomide or other therapy due to potential toxicities and her age/compromised performance status  -baseline severe pancytopenia (ANC <0.5, hgb <7, plt 30-40)  -continue with supportive care  -long term prognosis very poor and may benefit from family conference and palliative care input to review goals of therapy once acute deterioration is stabilized    Pancytopenia  -due to myelodysplasia  -renal failure may be contributing to anemia  -continue erythropoietin   -continue red cell transfusions for hemoglobin <7 and platelets for platelet < 10,000 or bleeding  -can consider short term addition of neupogen if fever persists  -monitor CBC serially    Secondary Hemachromatosis  -related to frequent transfusions  -continue desferal    ID  -healthcare associated pneumonia  -agree with broad spectrum antibiotics    Renal Failure  -on IV fluids  -appreciate hospitalist care    Encephalopathy  -monitor with supportive care    Liver Function Test Elevations  -iron over load and infection  may be contributing  -CT abdomen noted  -monitor      Gopi Gagnon    Code Status    Prior    Primary Care Physician   Abeba Bradford    History of Present Illness   Josy Thomson is a 89 year old female who presents with fever.    Past Medical History   I have reviewed this patient's medical history and updated it with pertinent information if needed.   Past Medical History:   Diagnosis Date     Anemia      Arthritis      History of blood transfusion       History of pneumonia      Hypertension      Macular degeneration      Tachycardia        Past Surgical History   I have reviewed this patient's surgical history and updated it with pertinent information if needed.  Past Surgical History:   Procedure Laterality Date     BONE MARROW BIOPSY, BONE SPECIMEN, NEEDLE/TROCAR Right 10/12/2015    Procedure: BIOPSY BONE MARROW;  Surgeon: David Mercado MD;  Location: [facility] OR     ENT SURGERY       ORTHOPEDIC SURGERY  2006    right shoulder pinning       Prior to Admission Medications   Prior to Admission Medications   Prescriptions Last Dose Informant Patient Reported? Taking?   Acetaminophen (TYLENOL PO) 3/1/2018 at Unknown time  Yes Yes   Sig: Take 500 mg by mouth every 4 hours as needed for mild pain or fever   B Complex Vitamins (B COMPLEX 1 PO) 3/2/2018 at am  Yes Yes   Sig: Take 1 tablet by mouth daily    DARBEPOETIN KEVIN-POLYSORBATE IJ   Yes No   Sig: Inject 4 mLs as directed Every other week   HYDROmorphone HCl (DILAUDID PO)   Yes Yes   Sig: Take 2 mg by mouth nightly as needed (break-through pain at night)   HYDROmorphone HCl (DILAUDID PO) 3/2/2018 at 1800  Yes Yes   Sig: Take 2 mg by mouth 3 times daily   LevoFLOXacin (LEVAQUIN PO) 3/2/2018 at 1200  Yes Yes   Sig: Take 250 mg by mouth daily For 5 days, last dose on 3-5-18   Multiple Vitamins-Minerals (OCUVITE PO) 3/2/2018 at 2nd-pm  Yes Yes   Sig: Take 1 tablet by mouth 2 times daily   Oseltamivir Phosphate (TAMIFLU PO) 3/2/2018 at pm  Yes Yes   Sig: Take 30 mg by mouth 2 times daily For 5 days (last day 3-3-18)   albuterol (2.5 MG/3ML) 0.083% neb solution   Yes Yes   Sig: Take 1 vial by nebulization every 6 hours as needed for shortness of breath / dyspnea or wheezing   albuterol (2.5 MG/3ML) 0.083% neb solution 3/2/2018 at 1800  Yes Yes   Sig: Take 1 vial by nebulization 3 times daily   calcium carb 1250 mg, 500 mg [place],/vitamin D 200 units (OSCAL WITH D) 500-200 MG-UNIT per tablet 3/2/2018 at am Other Yes Yes    Sig: Take 2 tablets by mouth daily    cyanocobalamin (VITAMIN B12) 1000 MCG/ML injection due 3-29-18 at Unknown time  Yes Yes   Sig: Inject 1 mL into the muscle every 30 days   deferoxamine   Yes No   Sig: Inject 2,000 mg into the vein Every 2 weeks With blood transfusions   metoprolol (TOPROL-XL) 100 MG 24 hr tablet 3/2/2018 at am Other Yes Yes   Sig: Take 100 mg by mouth daily.   omeprazole (PRILOSEC) 20 MG capsule 3/2/2018 at am Other Yes Yes   Sig: Take 40 mg by mouth daily    propylene glycol (SYSTANE BALANCE) 0.6 % SOLN ophthalmic solution 3/2/2018 at 1600  Yes Yes   Sig: Place 1 drop into both eyes 3 times daily   saccharomyces boulardii (FLORASTOR) 250 MG capsule 3/2/2018 at am  Yes Yes   Sig: Take 250 mg by mouth daily For 7 days (last day 3-8-18)   trimethoprim-polymyxin b (POLYTRIM) ophthalmic solution 3/2/2018 at 2000  Yes Yes   Sig: Place 1 drop Into the left eye 4 times daily For 7 days (llast day 3-5-18)   venlafaxine (EFFEXOR XR) 75 MG 24 hr capsule 3/2/2018 at am  Yes Yes   Sig: Take 75 mg by mouth daily.        Facility-Administered Medications: None     Allergies   No Known Allergies    Social History   I have reviewed this patient's social history and updated it with pertinent information if needed. Josy Thomson  reports that she has never smoked. She has never used smokeless tobacco. She reports that she does not drink alcohol or use illicit drugs.    Family History   I have reviewed this patient's family history and updated it with pertinent information if needed.   No family history on file.    Review of Systems   The 5 point Review of Systems is negative other than noted in the HPI or here.     Physical Exam   Temp: 100.7  F (38.2  C) Temp src: Temporal BP: 115/48   Heart Rate: 105 Resp: 20 SpO2: 96 % O2 Device: Nasal cannula Oxygen Delivery: 4 LPM  Vital Signs with Ranges  Temp:  [97  F (36.1  C)-100.7  F (38.2  C)] 100.7  F (38.2  C)  Heart Rate:  [] 105  Resp:  [20-22] 20  BP:  ()/(48-83) 115/48  SpO2:  [90 %-96 %] 96 %  131 lbs 11.2 oz    Constitutional: awake, confused  GI:  soft, non-distended, mild diffusely tender, no masses palpated  Musculoskeletal: no pedal edema    Data   Results for orders placed or performed during the hospital encounter of 03/02/18 (from the past 24 hour(s))   CBC with platelets differential   Result Value Ref Range    WBC 0.3 (LL) 4.0 - 11.0 10e9/L    RBC Count 2.45 (L) 3.8 - 5.2 10e12/L    Hemoglobin 7.0 (L) 11.7 - 15.7 g/dL    Hematocrit 21.6 (L) 35.0 - 47.0 %    MCV 88 78 - 100 fl    MCH 28.6 26.5 - 33.0 pg    MCHC 32.4 31.5 - 36.5 g/dL    RDW 15.0 10.0 - 15.0 %    Platelet Count 37 (LL) 150 - 450 10e9/L    Diff Method WBC <0.5, Diff not done    Comprehensive metabolic panel   Result Value Ref Range    Sodium 136 133 - 144 mmol/L    Potassium 4.4 3.4 - 5.3 mmol/L    Chloride 101 94 - 109 mmol/L    Carbon Dioxide 26 20 - 32 mmol/L    Anion Gap 9 3 - 14 mmol/L    Glucose 74 70 - 99 mg/dL    Urea Nitrogen 86 (H) 7 - 30 mg/dL    Creatinine 2.67 (H) 0.52 - 1.04 mg/dL    GFR Estimate 17 (L) >60 mL/min/1.7m2    GFR Estimate If Black 20 (L) >60 mL/min/1.7m2    Calcium 8.7 8.5 - 10.1 mg/dL    Bilirubin Total 1.0 0.2 - 1.3 mg/dL    Albumin 1.7 (L) 3.4 - 5.0 g/dL    Protein Total 5.8 (L) 6.8 - 8.8 g/dL    Alkaline Phosphatase 152 (H) 40 - 150 U/L     (H) 0 - 50 U/L     (H) 0 - 45 U/L   Nt probnp inpatient   Result Value Ref Range    N-Terminal Pro BNP Inpatient 99374 (H) 0 - 1800 pg/mL   Lipase   Result Value Ref Range    Lipase 26 (L) 73 - 393 U/L   Troponin I   Result Value Ref Range    Troponin I ES <0.015 0.000 - 0.045 ug/L   ISTAT gases lactate reynaldo POCT   Result Value Ref Range    Ph Venous 7.34 7.32 - 7.43 pH    PCO2 Venous 47 40 - 50 mm Hg    PO2 Venous 43 25 - 47 mm Hg    Bicarbonate Venous 25 21 - 28 mmol/L    O2 Sat Venous 76 %    Lactic Acid 1.1 0.7 - 2.1 mmol/L   ISTAT Basic Met ICa HCT POCT   Result Value Ref Range    Sodium 136 133 -  144 mmol/L    Potassium 4.5 3.4 - 5.3 mmol/L    Chloride 99 94 - 109 mmol/L    Total CO2 27 20 - 32 mmol/L    Anion Gap 10 6 - 17 mmol/L    Glucose 75 70 - 99 mg/dL    Urea Nitrogen 75 (H) 7 - 30 mg/dL    Creatinine 3.4 (H) 0.52 - 1.04 mg/dL    GFR Estimate 13 (L) >60 mL/min/1.7m2    GFR Estimate If Black 15 (L) >60 mL/min/1.7m2    Calcium Ionized 4.4 4.4 - 5.2 mg/dL    Hemoglobin 18.7 (H) 11.7 - 15.7 g/dL    Hematocrit - POCT 55 (H) 35.0 - 47.0 %PCV   Blood culture   Result Value Ref Range    Specimen Description Blood Left Arm     Special Requests Aerobic and anaerobic bottles received     Culture Micro No growth after 6 hours    POC US ABDOMEN LIMITED    Impression    PROCEDURE NOTE --> Emergency Bedside Ultrasound    Procedure Name: Modified RUSH exam    Preformed by: Wilber Qureshi MD    Indication - Hypotension    Probe: low frequency curvilinear probe    Windows - Hepatorenal, Splenorenal, Suprapubic, Subxyphoid, Abdominal Aortic Views, IVC, bilateral lung windows    Findings - No intraperitoneal free fluid, No pericardial effusion, No Pneumothorax, No Abdominal aortic aneurysm, <18mm, >30% collapsibility IVC, grossly normal contractility,     Impression  -hypovolemia preserved contractility no sign of obstructive shock    Images saved on ultrasound internal hard drive per protocol   XR Chest Port 1 View    Narrative    PORTABLE CHEST ONE VIEW   3/2/2018  9:36 PM     HISTORY: Fever.    COMPARISON: 2/17/2018 chest x-ray.      Impression    IMPRESSION: Moderate stable cardiomegaly. Mildly prominent central  pulmonary vasculature. Increased density in the perihilar regions  bilaterally and left lower lobe. These opacities could be caused by  congestive heart failure. Cannot exclude pneumonia in the right  perihilar region or left lower lobe.    Advanced bilateral shoulder degenerative changes.    ANURADHA SERRANO MD   Blood culture   Result Value Ref Range    Specimen Description Blood Right Arm     Special Requests  Aerobic and anaerobic bottles received     Culture Micro No growth after 6 hours    Abd/pelvis CT no contrast - Stone Protocol    Narrative    CT ABDOMEN AND PELVIS WITHOUT CONTRAST   3/2/2018 10:37 PM     HISTORY: Abdominal pain, fever, altered mental status.    TECHNIQUE: No IV contrast material. Radiation dose for this scan was  reduced using automated exposure control, adjustment of the mA and/or  kV according to patient size, or iterative reconstruction technique.    COMPARISON: CT scan from 6/15/2012.    FINDINGS: Scans through the lung bases demonstrate bibasilar  consolidation suspicious for pneumonia. I suspect there is bilateral  hilar adenopathy as well.    The noncontrast appearance of the liver is normal. There appears to be  a peripherally calcified porcelain type gallbladder with variable  density within it likely sludge or cholesterol gallstones. Polyps not  excluded. The low-density areas within the gallbladder are more  prominent than on the comparison study. Spleen is atrophic. Pancreas  is atrophic but otherwise unremarkable. No adrenal lesions. No kidney  stones or hydronephrosis. No contour deforming renal masses. There is  likely a left mid pole renal cortical cyst    No evidence of abdominal aortic aneurysm or retroperitoneal  adenopathy.    Scans through the pelvis demonstrate calcified uterine fibroid.  Bladder has a normal appearance. No evidence of diverticulitis or  appendicitis. No evidence of bowel obstruction. No free air or free  fluid.      Impression    IMPRESSION:  1. Bibasilar consolidation suspicious for pneumonia. There is likely  bilateral hilar adenopathy as well seen on the images that include a  portion of the lower chest.  2. There is a peripherally calcified porcelain type gallbladder with  mixed density material within it. Two low density round areas are more  prominent than on the comparison study and could represent cholesterol  gallstones. A polyp of some sort would  be difficult to exclude. On  this study, there are two low-density areas in the gallbladder and  previously there was one.  3. No evidence of diverticulitis or appendicitis.    ANURADHA SERRANO MD   Head CT w/o contrast    Narrative    CT SCAN OF THE HEAD WITHOUT CONTRAST   3/2/2018 10:38 PM     HISTORY: Agitation, acute altered mental status.      TECHNIQUE:  Axial images of the head and coronal reformations without  IV contrast material. Radiation dose for this scan was reduced using  automated exposure control, adjustment of the mA and/or kV according  to patient size, or iterative reconstruction technique.    COMPARISON: 2/16/2018.    FINDINGS: There is no evidence of intracranial hemorrhage, mass, acute  infarct or anomaly. There is generalized atrophy of the brain. There  is low attenuation in the white matter of the cerebral hemispheres  consistent with sequelae of small vessel ischemic disease.     The visualized portions of the sinuses and mastoids appear normal. The  bony calvarium and bones of the skull base appear intact. Bilateral  pseudophakia.      Impression    IMPRESSION:     1. No evidence of acute intracranial hemorrhage, mass, or herniation.  2. There is generalized atrophy of the brain. White matter changes are  present in the cerebral hemispheres that are consistent with small  vessel ischemic disease in this age patient.      BETHANY HUANG MD   EKG 12-lead, tracing only   Result Value Ref Range    Interpretation ECG Click View Image link to view waveform and result    UA with Microscopic   Result Value Ref Range    Color Urine Nai     Appearance Urine Cloudy     Glucose Urine Negative NEG^Negative mg/dL    Bilirubin Urine Negative NEG^Negative    Ketones Urine Negative NEG^Negative mg/dL    Specific Gravity Urine 1.018 1.003 - 1.035    Blood Urine Negative NEG^Negative    pH Urine 5.0 5.0 - 7.0 pH    Protein Albumin Urine Negative NEG^Negative mg/dL    Urobilinogen mg/dL 4.0 (H) 0.0 - 2.0 mg/dL     Nitrite Urine Negative NEG^Negative    Leukocyte Esterase Urine Negative NEG^Negative    Source Catheterized Urine     WBC Urine 3 0 - 5 /HPF    RBC Urine 2 0 - 2 /HPF    Bacteria Urine Few (A) NEG^Negative /HPF    Squamous Epithelial /HPF Urine 1 0 - 1 /HPF    Mucous Urine Present (A) NEG^Negative /LPF    Hyaline Casts 5 (H) 0 - 2 /LPF    Amorphous Crystals Moderate (A) NEG^Negative /HPF   Urine Culture Aerobic Bacterial   Result Value Ref Range    Specimen Description Catheterized Urine     Special Requests Specimen received in preservative     Culture Micro PENDING    Glucose by meter   Result Value Ref Range    Glucose 75 70 - 99 mg/dL   Glucose by meter   Result Value Ref Range    Glucose 100 (H) 70 - 99 mg/dL   CBC with platelets differential   Result Value Ref Range    WBC 0.5 (LL) 4.0 - 11.0 10e9/L    RBC Count 2.42 (L) 3.8 - 5.2 10e12/L    Hemoglobin 7.0 (L) 11.7 - 15.7 g/dL    Hematocrit 21.3 (L) 35.0 - 47.0 %    MCV 88 78 - 100 fl    MCH 28.9 26.5 - 33.0 pg    MCHC 32.9 31.5 - 36.5 g/dL    RDW 15.1 (H) 10.0 - 15.0 %    Platelet Count 23 (LL) 150 - 450 10e9/L    Diff Method Manual Differential     % Neutrophils 57.0 %    % Lymphocytes 27.0 %    % Monocytes 10.0 %    % Eosinophils 6.0 %    % Basophils 0.0 %    Absolute Neutrophil 0.3 (LL) 1.6 - 8.3 10e9/L    Absolute Lymphocytes 0.1 (L) 0.8 - 5.3 10e9/L    Absolute Monocytes 0.1 0.0 - 1.3 10e9/L    Absolute Eosinophils 0.0 0.0 - 0.7 10e9/L    Absolute Basophils 0.0 0.0 - 0.2 10e9/L    RBC Morphology Consistent with reported results     Platelet Estimate       Automated count confirmed.  Platelet morphology is normal.   Basic metabolic panel   Result Value Ref Range    Sodium 138 133 - 144 mmol/L    Potassium 4.0 3.4 - 5.3 mmol/L    Chloride 106 94 - 109 mmol/L    Carbon Dioxide 25 20 - 32 mmol/L    Anion Gap 7 3 - 14 mmol/L    Glucose 103 (H) 70 - 99 mg/dL    Urea Nitrogen 87 (H) 7 - 30 mg/dL    Creatinine 2.35 (H) 0.52 - 1.04 mg/dL    GFR Estimate 19  (L) >60 mL/min/1.7m2    GFR Estimate If Black 24 (L) >60 mL/min/1.7m2    Calcium 8.3 (L) 8.5 - 10.1 mg/dL   CBC with platelets differential   Result Value Ref Range    WBC 0.5 (LL) 4.0 - 11.0 10e9/L    RBC Count 2.33 (L) 3.8 - 5.2 10e12/L    Hemoglobin 6.6 (LL) 11.7 - 15.7 g/dL    Hematocrit 20.5 (L) 35.0 - 47.0 %    MCV 88 78 - 100 fl    MCH 28.3 26.5 - 33.0 pg    MCHC 32.2 31.5 - 36.5 g/dL    RDW 15.1 (H) 10.0 - 15.0 %    Platelet Count 31 (LL) 150 - 450 10e9/L    Diff Method Manual Differential     % Neutrophils 55.0 %    % Lymphocytes 27.0 %    % Monocytes 2.0 %    % Eosinophils 1.0 %    % Basophils 0.0 %    % Metamyelocytes 14.0 %    % Myelocytes 1.0 %    Absolute Neutrophil 0.3 (LL) 1.6 - 8.3 10e9/L    Absolute Lymphocytes 0.1 (L) 0.8 - 5.3 10e9/L    Absolute Monocytes 0.0 0.0 - 1.3 10e9/L    Absolute Eosinophils 0.0 0.0 - 0.7 10e9/L    Absolute Basophils 0.0 0.0 - 0.2 10e9/L    Absolute Metamyelocytes 0.1 (H) 0 10e9/L    Absolute Myelocytes 0.0 0 10e9/L    Anisocytosis Slight     Microcytes Present     Macrocytes Present     Dohle Bodies Present     Toxic Granulation Present     Platelet Estimate       Automated count confirmed.  Giant platelets are present.

## 2018-03-03 NOTE — PHARMACY-VANCOMYCIN DOSING SERVICE
Pharmacy Vancomycin Initial Note  Date of Service March 3, 2018  Patient's  [id number]  89 year old, female    Indication: Healthcare-Associated Pneumonia; Neutropenic Fever    Current estimated CrCl = Estimated Creatinine Clearance: 13.2 mL/min (based on Cr of 2.67).    Creatinine for last 3 days  3/2/2018:  7:02 AM Creatinine 2.45 mg/dL;  9:01 PM Creatinine 2.67 mg/dL    Recent Vancomycin Level(s) for last 3 days  No results found for requested labs within last 72 hours.      Vancomycin IV Administrations (past 72 hours)                   vancomycin 1250 mg in 0.9% NaCl 250 mL PREMIX (mg) 1,250 mg New Bag 18                Nephrotoxins and other renal medications (Future)    Start     Dose/Rate Route Frequency Ordered Stop    18  vancomycin [place] - receiving intermittent dosing      1 each Does not apply SEE ADMIN INSTRUCTIONS 18            Contrast Orders - past 72 hours     None                Plan:  1.  Start vancomycin  1250 mg IV once; Pharmacy to follow levels.   2.  Goal Trough Level: 15-20 mg/L   3.  Pharmacy will check trough levels as appropriate in 1-3 Days.    4. Serum creatinine levels will be ordered daily for the first week of therapy and at least twice weekly for subsequent weeks.    5. [place] method utilized to dose vancomycin therapy: Method 1    Naveed Crooks [facility]

## 2018-03-03 NOTE — ED NOTES
Pts BPs have been responsive to IV fluids. Stabilized to low 100s SBPs. SpO2 maintained 90-96% on 5L Oxymask, will attempt to wean.

## 2018-03-03 NOTE — ED NOTES
"[facility]  ED Nurse Handoff Report    Josy Thomson is a 89 year old female   ED Chief complaint: Altered Mental Status  . ED Diagnosis:   Final diagnoses:   Pancytopenia (H)   MDS (myelodysplastic syndrome) (H)   Pneumonia of both lower lobes due to infectious organism   Acute delirium   Acute renal failure, unspecified acute renal failure type (H)   Severe sepsis (H)     Allergies: No Known Allergies    Code Status: DNR / DNI  Activity level - Baseline/Home:  Stand with Assist. Activity Level - Current:   Total Care. Lift room needed: No. Bariatric: No   Needed: No   Isolation: No. Infection: Not Applicable.     Vital Signs:   Vitals:    03/02/18 2300 03/02/18 2315 03/02/18 2330 03/02/18 2331   BP: (!) 87/57 115/61 106/60    Resp:       Temp:       TempSrc:       SpO2: 90% 94% 95%    Weight:    52 kg (114 lb 10.2 oz)       Cardiac Rhythm:  ,      Pain level:    Patient confused: Yes, pt confused at baseline but now able to answer questions and respond to prompts. Patient Falls Risk: Yes.   Elimination Status: Urethral catheter (calle) in place; refer to orders to discontinue as per protocol    Patient Report - Initial Complaint: Altered mental status. Focused Assessment:    21:30 Neurological Cognitive/Perceptual/Neuro - Cognitive/Neuro/Behavioral WDL:  WDL except; all Level Of Consciousness: somnolent Arousal Level: arouses to touch/gentle shaking Best Language: 2 - Severe aphasia (Pt not able to make comprehensible words, just moans, groans, and screams. ) Neurological Comment: Pts family report mental status changes the last two days, yesterday family noted that pt was fairly close to baseline, possibly more easily agitated. Today family and assisted living staff report that pt was agitated, aggressive at times, not redirectable, and would not take her medications. Pt arrives uncooperative, screaming \"OW!\" with every touch.   [place] Coma Scale - Best Eye Response: 3-->(E3) to speech " Best Motor Response: 5-->(M5) localizes pain Best Verbal Response: 2-->(V2) incomprehensible speech [place] Coma Scale Score: 10 MH    21:30 Respiratory Respiratory - Respiratory WDL:  WDL except; all; cough Rhythm/Pattern, Respiratory: tachypnea (Pt on roughly day 5 of influenza tx, family noted that pt has had continued fevers, generalized weakness, increasing generalized body aches and pains. ) Lung Fields: All Fields Throughout All Lung Fields: coarse; crackles coarse (Pt sounds congested, wet, and course even without stethoscope)       23:30 ED Notes Addendum While ICU MD in room, pt began speaking actual words with meaning. She could answer questions, speak with family, give attitude, and even laughed at one point. First time I have heard pt talk since arrival. Pts daughters asking about pain medications, apparently pt receives dilaudid at home, told them we were concerned about blood pressure and would have to discuss with MD.        Tests Performed: CXR, labs, UA, CT. Abnormal Results:   Labs Ordered and Resulted from Time of ED Arrival Up to the Time of Departure from the ED   CBC WITH PLATELETS DIFFERENTIAL - Abnormal; Notable for the following:        Result Value    WBC 0.3 (*)     RBC Count 2.45 (*)     Hemoglobin 7.0 (*)     Hematocrit 21.6 (*)     Platelet Count 37 (*)     All other components within normal limits   COMPREHENSIVE METABOLIC PANEL - Abnormal; Notable for the following:     Urea Nitrogen 86 (*)     Creatinine 2.67 (*)     GFR Estimate 17 (*)     GFR Estimate If Black 20 (*)     Albumin 1.7 (*)     Protein Total 5.8 (*)     Alkaline Phosphatase 152 (*)      (*)      (*)     All other components within normal limits   ROUTINE UA WITH MICROSCOPIC - Abnormal; Notable for the following:     Urobilinogen mg/dL 4.0 (*)     Bacteria Urine Few (*)     Mucous Urine Present (*)     Hyaline Casts 5 (*)     Amorphous Crystals Moderate (*)     All other components within normal limits    NT PROBNP INPATIENT - Abnormal; Notable for the following:     N-Terminal Pro BNP Inpatient 13541 (*)     All other components within normal limits   LIPASE - Abnormal; Notable for the following:     Lipase 26 (*)     All other components within normal limits   ISTAT BASIC MET ICA HCT POCT - Abnormal; Notable for the following:     Urea Nitrogen 75 (*)     Creatinine 3.4 (*)     GFR Estimate 13 (*)     GFR Estimate If Black 15 (*)     Hemoglobin 18.7 (*)     Hematocrit - POCT 55 (*)     All other components within normal limits   TROPONIN I   PULSE OXIMETRY NURSING   CARDIAC CONTINUOUS MONITORING   MEASURE URINE OUTPUT   PATIENT CARE ORDER   ISTAT CG4 GASES LACTATE YOSVANY NURSING POCT   ISTAT GAS OR ELECTROLYTE NURSING POCT   ISTAT  GASES LACTATE YOSVANY POCT   URINE CULTURE AEROBIC BACTERIAL   BLOOD CULTURE   BLOOD CULTURE     Pt chronically low blood counts, receives transfusions every two weeks, last transfusion was 2/23.    POC US ABDOMEN LIMITED   Final Result   PROCEDURE NOTE --> Emergency Bedside Ultrasound      Procedure Name: Modified RUSH exam      Preformed by: Wilber Qureshi MD      Indication - Hypotension      Probe: low frequency curvilinear probe      Windows - Hepatorenal, Splenorenal, Suprapubic, Subxyphoid, Abdominal Aortic Views, IVC, bilateral lung windows      Findings - No intraperitoneal free fluid, No pericardial effusion, No Pneumothorax, No Abdominal aortic aneurysm, <18mm, >30% collapsibility IVC, grossly normal contractility,       Impression  -hypovolemia preserved contractility no sign of obstructive shock      Images saved on ultrasound internal hard drive per protocol      Abd/pelvis CT no contrast - Stone Protocol   Final Result   IMPRESSION:   1. Bibasilar consolidation suspicious for pneumonia. There is likely   bilateral hilar adenopathy as well seen on the images that include a   portion of the lower chest.   2. There is a peripherally calcified porcelain type gallbladder with    mixed density material within it. Two low density round areas are more   prominent than on the comparison study and could represent cholesterol   gallstones. A polyp of some sort would be difficult to exclude. On   this study, there are two low-density areas in the gallbladder and   previously there was one.   3. No evidence of diverticulitis or appendicitis.      ANURADHA SERRANO MD      Head CT w/o contrast   Final Result   IMPRESSION:      1. No evidence of acute intracranial hemorrhage, mass, or herniation.   2. There is generalized atrophy of the brain. White matter changes are   present in the cerebral hemispheres that are consistent with small   vessel ischemic disease in this age patient.         BETHANY HUANG MD      XR Chest Port 1 View   Final Result   IMPRESSION: Moderate stable cardiomegaly. Mildly prominent central   pulmonary vasculature. Increased density in the perihilar regions   bilaterally and left lower lobe. These opacities could be caused by   congestive heart failure. Cannot exclude pneumonia in the right   perihilar region or left lower lobe.      Advanced bilateral shoulder degenerative changes.      ANURADHA SERRANO MD      .   Treatments provided: Iv fluids, BP management, Abx, monitoring   Family Comments: Daughters at bedside   OBS brochure/video discussed/provided to patient:  N/A  ED Medications:   Medications   vancomycin 1250 mg in 0.9% NaCl 250 mL PREMIX (1,250 mg Intravenous New Bag 3/2/18 2328)   lactated ringers BOLUS 1,000 mL (0 mLs Intravenous Stopped 3/2/18 2212)   0.9% sodium chloride BOLUS (0 mLs Intravenous Stopped 3/2/18 2211)   ceFEPIme (MAXIPIME) 2 g vial to attach to  ml bag for ADULTS or 50 ml bag for PEDS (0 g Intravenous Stopped 3/2/18 2327)   lactated ringers BOLUS 250 mL (0 mLs Intravenous Stopped 3/2/18 2355)     Drips infusing:  Yes- Vancomycin  For the majority of the shift, the patient's behavior Yellow. Interventions performed were Deescalation, pts family  seems to calm her, pt also much more cooperative at this time versus arrival, be sure to explain everything you do to her before doing it.     Severe Sepsis OR Septic Shock Diagnosis Present: Yes    ED Nurse Name/Phone Number: Kennedy Bauer,   12:16 AM  RECEIVING UNIT ED HANDOFF REVIEW    Above ED Nurse Handoff Report was reviewed: Yes  Reviewed by: Usha Antonio on March 3, 2018 at 12:40 AM

## 2018-03-03 NOTE — PROVIDER NOTIFICATION
"0925: MD paged \"Please see labs. Hgb 7.0, WBC 0.5. Thanks\"  --MD ordered repeat labs  1104: MD paged \"Critical labs WBC 0.5, Hgb 6.6, Plts 31, ab neutrophil 0.3. Also FYI patient is still NPO. No speech consult placed.\"  "

## 2018-03-03 NOTE — PLAN OF CARE
"Problem: Patient Care Overview  Goal: Plan of Care/Patient Progress Review  Outcome: No Change  Arrived to unit at 0130. Oriented to self, arouses to voice/touch, confused, anxious, repetitive speech, sleepy. -low 110s, 93% on 4L NC., other VSS. Tele ST with BBB. LS coarse, coarse crackles, tachypnea, dyspneic. Patient grimaced and stated \"ouch\" with ROM of R arm, Hx of ORIF to R shoulder, repositioned and avoided ROM to R shoulder. Assist of 2 with mechanical lift. BG 75, MD notified, D5  mL/hr ordered,  on re-assessment. NPO except ice chips. Farmer intact, draining tea colored urine, good UOP. Hematology/oncology following. Continue with POC.       "

## 2018-03-03 NOTE — ED NOTES
Pt placed on 5L NC, then moved to Oxymask for labile SpO2 and difficulty getting good wave pleth.

## 2018-03-03 NOTE — PROGRESS NOTES
"[facility] RCAT     Date: 3/3/18  Admission Dx: Pneumonia  Pulmonary History: None  Home Nebulizer/MDI Use: Albuterol TID  Home Oxygen: Unknown, Pt is poor historian.  Acuity Level (RCAT flow sheet): 2  Aerosol Therapy initiated: Changed Duoneb QID to Q4 hours, Changed albuterol Q2 hours prn to Q4 hours prn.      Pulmonary Hygiene initiated: Deep breath and cough QID.      Volume Expansion initiated: IS QID if she is able.      Current Oxygen Requirements: 3 Lpm NC  Current SpO2: 95%    Re-evaluation date: 3/6/2018    Patient Education: Informed Pt of RCAT.  She is not following commands the best at the moment and is confused.      See \"RT Assessments\" flow sheet for patient assessment scoring and Acuity Level Details.       Stephan Tai  March 3, 2018.2:13 PM            "

## 2018-03-03 NOTE — ED NOTES
Patient here for increased confusion, fever, and uncooperative. Patient poor historian. Flu diagnose and currently on day 5 of tamiflu. ABCs intact, gcs 14. Per daughter, confuse for 2 weeks, onoing fever since Saturday at care center, was admitted to observation prior to getting into care center.

## 2018-03-04 NOTE — PLAN OF CARE
Problem: Patient Care Overview  Goal: Plan of Care/Patient Progress Review  Outcome: No Change  Pt A&O to self only, restless/anxious at times, up with a lift, Q2/hr turns, daulidid scheduled for pain, weaned O2 to 2 lpm sating >95%, NPO, calle, D5 1/2 75 ml/hr, tele sinus tach, resting comfortably throughout most of shift, OT/PT/Palliative care consulted, Will continue POC.

## 2018-03-04 NOTE — PROVIDER NOTIFICATION
Patient is restless, agitated, and anxious. Can we get an order for a PRN medication?   MD Notified.

## 2018-03-04 NOTE — PROGRESS NOTES
[facility]    Oncology Progress Note    Date of Service (when I saw the patient): 03/04/2018     Assessment & Plan   Josy Thomson is a 89 year old female who was admitted on 3/2/2018.       Myelodysplasia  -followed by Dr. 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, 5q-, low risk cytogenetics, <5%blasts  -has since received erythropoietin and supportive transfusions  -has declined lenalidomide or other therapy due to potential toxicities and her age/compromised performance status  -baseline severe pancytopenia (ANC <0.5, hgb <7, plt 30-40)  -continue with supportive care  -long term prognosis very poor and may benefit from family conference and palliative care input to review goals of therapy once acute deterioration is stabilized     Pancytopenia  -due to myelodysplasia  -renal failure may be contributing to anemia  -continue erythropoietin   -ok for red cell transfusion as ordered   -continue red cell transfusions for hemoglobin <7 and platelets for platelet < 10,000 or bleeding  -can consider short term addition of neupogen if fever persists  -monitor CBC serially     Secondary Hemachromatosis  -related to frequent transfusions  -continue desferal     ID  -healthcare associated pneumonia  -agree with broad spectrum antibiotics     Renal Failure  -on IV fluids  -appreciate hospitalist care     Encephalopathy  -monitor with supportive care     Liver Function Test Elevations  -iron over load and infection  may be contributing  -CT abdomen noted  -monitor        Code Status: DNR/DNI    Gopi Gagnon    Interval History   No new concerns    Physical Exam   Temp: 97.3  F (36.3  C) Temp src: Axillary BP: 145/67   Heart Rate: 107 Resp: 22 SpO2: 93 % O2 Device: None (Room air) Oxygen Delivery: 2 LPM  Vitals:    03/02/18 2331 03/03/18 0128   Weight: 52 kg (114 lb 10.2 oz) 59.7 kg (131 lb 11.2 oz)     Vital Signs with Ranges  Temp:  [95.3  F (35.2  C)-100.7  F (38.2  C)] 97.3  F (36.3  C)  Heart Rate:   [] 107  Resp:  [18-24] 22  BP: ()/(39-67) 145/67  SpO2:  [93 %-100 %] 93 %  I/O last 3 completed shifts:  In: 1052 [I.V.:1052]  Out: 720 [Urine:720]    Constitutional: resting comfortably      Medications     dextrose 5% and 0.9% NaCl 100 mL/hr at 03/04/18 0804       QUEtiapine  12.5 mg Oral Once     sodium chloride (PF)  3 mL Intracatheter Q8H     ceFEPIme (MAXIPIME) IV  2,000 mg Intravenous Q24H     vancomycin place puentes - receiving intermittent dosing  1 each Does not apply See Admin Instructions     haloperidol lactate  2 mg Intravenous Once     Carboxymethylcellulose Sod PF  1 drop Both Eyes TID     HYDROmorphone  0.3 mg Intravenous TID     ipratropium - albuterol 0.5 mg/2.5 mg/3 mL  3 mL Nebulization Q4H       Data   Results for orders placed or performed during the hospital encounter of 03/02/18 (from the past 24 hour(s))   ABO/Rh type and screen   Result Value Ref Range    Units Ordered 1     ABO O     RH(D) Pos     Antibody Screen Neg     Test Valid Only At [facility]        Specimen Expires 03/06/2018     Crossmatch Red Blood Cells    Blood component   Result Value Ref Range    Unit Number [id number]     Blood Component Type Red Blood Cells Leukocyte Reduced     Division Number 00     Status of Unit Released to care unit     Blood Product Code [id number]     Unit Status ISS    Care Coordinator IP Consult    Narrative    Alexandra Potter CM     3/3/2018  5:01 PM  Care Transition Initial Assessment - RN    Reason For Consult: care coordination/care conference, community   resources, discharge planning   Information obtained through chart review.  DATA   Active Problems:    HCAP (healthcare-associated pneumonia)       Cognitive Status: awake and confused.  Primary Care Clinic Name: [facility]  Primary Care MD Name: Dr. Abeba Bradford  Contact information and PCP information verified: Yes  Lives With: alone  Living Arrangements: house     Description of Support  System: Supportive, Involved   Who is your support system?: Children, Facility resident(s)/Staff     Support Assessment: Lacks necessary supervision and assistance,   Lacks adequate physical care, Caregiver experiencing high stress,   Caregiver difficulty providing physical care/safety   Insurance concerns: No Insurance issues identified  ASSESSMENT  Patient currently receives the following services:  When at home,   daughter Jazmine, is PCA. Not other services. Patient has [facility] [phone].         Identified issues/concerns regarding health management: Home   safety. Daughter, Jazmine, is patient's PCA. She provides daily AM   cares. Jazmine will prep meals for the day as patient cannot cook   for herself; assist with hygiene cares. Patient has macular   degeneration w/very poor eye sight and very [place]. Mentation at   baseline is very sharp. She has been experiencing confusion the   last 2 admissions. Jazmine does all grocery shopping, pays bills,   provides transportation for patient. Also of concern, patient has   had multiple falls at home. She has a Life Alert but forgets to   push it.  Prior to last admission (2/16/18) there was concern for   caregiver fatigue. The patient was discharged to [facility] after last   admission. Unclear where [facility] is in the process to obtain   additional help.   PLAN  Financial costs for the patient were not discussed.  Patient given options and choices for discharge.  Patient/family is agreeable to the plan?  Yes  Patient anticipates discharging back to TCU pending family care   conference.      Patient anticipates needs for home equipment: No  Discharge Planner   Discharge Plans in progress: Yes  Barriers to discharge plan: IV antibiotics  Plan/Disposition: TCU pending Pallitive recommendations.   Appointments: TBD    CM will continue to follow patient until discharge for any   additional needs.     Glo Potter, RN, BSN, CTS  [facility]  [facility]  [phone]         Glucose by meter   Result Value Ref Range    Glucose 140 (H) 70 - 99 mg/dL   Lactic acid level STAT   Result Value Ref Range    Lactic Acid 1.8 0.7 - 2.0 mmol/L   CBC with platelets differential   Result Value Ref Range    WBC 0.6 (LL) 4.0 - 11.0 10e9/L    RBC Count 2.67 (L) 3.8 - 5.2 10e12/L    Hemoglobin 7.7 (L) 11.7 - 15.7 g/dL    Hematocrit 22.9 (L) 35.0 - 47.0 %    MCV 86 78 - 100 fl    MCH 28.8 26.5 - 33.0 pg    MCHC 33.6 31.5 - 36.5 g/dL    RDW 15.1 (H) 10.0 - 15.0 %    Platelet Count 18 (LL) 150 - 450 10e9/L    Diff Method Manual Differential     % Neutrophils 56.0 %    % Lymphocytes 40.0 %    % Monocytes 2.0 %    % Eosinophils 0.0 %    % Basophils 0.0 %    % Metamyelocytes 2.0 %    Absolute Neutrophil 0.3 (LL) 1.6 - 8.3 10e9/L    Absolute Lymphocytes 0.2 (L) 0.8 - 5.3 10e9/L    Absolute Monocytes 0.0 0.0 - 1.3 10e9/L    Absolute Eosinophils 0.0 0.0 - 0.7 10e9/L    Absolute Basophils 0.0 0.0 - 0.2 10e9/L    Absolute Metamyelocytes 0.0 0 10e9/L    Anisocytosis Slight     Microcytes Present     Dohle Bodies Present     Toxic Granulation Present     Platelet Estimate       Automated count confirmed.  Giant platelets are present.   Basic metabolic panel   Result Value Ref Range    Sodium 145 (H) 133 - 144 mmol/L    Potassium 3.6 3.4 - 5.3 mmol/L    Chloride 113 (H) 94 - 109 mmol/L    Carbon Dioxide 23 20 - 32 mmol/L    Anion Gap 9 3 - 14 mmol/L    Glucose 133 (H) 70 - 99 mg/dL    Urea Nitrogen 86 (H) 7 - 30 mg/dL    Creatinine 2.03 (H) 0.52 - 1.04 mg/dL    GFR Estimate 23 (L) >60 mL/min/1.7m2    GFR Estimate If Black 28 (L) >60 mL/min/1.7m2    Calcium 8.2 (L) 8.5 - 10.1 mg/dL

## 2018-03-04 NOTE — PLAN OF CARE
Problem: Patient Care Overview  Goal: Plan of Care/Patient Progress Review  Outcome: No Change  Alert, oriented to self, confused, anxious, agitated, sleepy. HR 90s-low 100s, 95% on 4L NC., other VSS. Tele ST with BBB. Pt agitated, restless, anxious, MD notified, one time ativan given-effective. Farmer catheter intact with good UOP. Mechanical lift-turned every 2 hours. IVF infusing at 100 mL/hr. NPO. IV dilaudid given as scheduled. Isolation-protective precautions maintained. Continue with POC.

## 2018-03-04 NOTE — PLAN OF CARE
Problem: Patient Care Overview  Goal: Plan of Care/Patient Progress Review  Outcome: No Change  Resp rate 20-28 at times. Lungs coarse throughout. Able to cough when enc-loose and swallows secretions. Remains NPO-Trailed sips of water for oral meds. Tolerated fair. MD notified and suggested she may benefit swallow evaluation. Continues on Cefipime and Vanco for pneumonia. IVF. Marleny for accurate output.

## 2018-03-04 NOTE — PHARMACY-VANCOMYCIN DOSING SERVICE
Pharmacy Vancomycin Note  Date of Service 2018  Patient's  [id number]   89 year old, female    Indication: Healthcare-Associated Pneumonia , Neutropenic Fever  Goal Trough Level: 15-20 mg/L  Day of Therapy: 3  Current Vancomycin regimen:  Intermittent Dosing (last dose = 1250 mg IV)    Current estimated CrCl = Estimated Creatinine Clearance: 17.4 mL/min (based on Cr of 2.03).    Creatinine for last 3 days  3/2/2018:  7:02 AM Creatinine 2.45 mg/dL  3/3/2018:  7:07 AM Creatinine 2.35 mg/dL  3/4/2018:  6:52 AM Creatinine 2.03 mg/dL    Recent Vancomycin Levels (past 3 days)  3/4/2018: 11:23 AM Vancomycin Level 9.6 mg/L       Contrast Orders - past 72 hours     None          Interpretation of levels and current regimen:  Trough level is  Subtherapeutic  Has serum creatinine changed > 50% in last 72 hours: Yes  Urine output:  good urine output  Renal Function: improving slightly    Plan:  1.  Continue intermittent dosing as Scr still > 2, vancomycin 1500 mg IV x 1 today  2.  Pharmacy will check trough levels as appropriate in 3-5 Days.    3. Serum creatinine levels will be ordered daily for the first week of therapy and at least twice weekly for subsequent weeks.      Angel Varma, PharmD        .

## 2018-03-04 NOTE — PROGRESS NOTES
[facility]  Hospitalist Progress Note  Name: Josy Thomson    MRN: [id number]  YOB: 1927    Age: 89 year old  Date of admission: 3/2/2018  Primary care provider: Abeba Bradford        Reason for Stay (Diagnosis): Healthcare associated pneumonia/neutropenic fever          Assessment and Plan:      Summary of Stay:  Josy Thomson is a 89 year old female patient with past medical history of myelodysplastic disorder, anemia, hypertension, arthritis, recent influenza infection, was brought from transitional care unit for evaluation for change in mental status.  History was obtained from patient's daughters.  Patient was febrile and tachycardic.  Laboratory workup showed WBC 0.3, creatinine 3.4.  CT of the abdomen/pelvis showed bibasilar consolidation suspicious for pneumonia.  She was given IV vancomycin and cefepime.  She was admitted to [facility] for further management.      1.  Healthcare associated pneumonia/neutropenic fever.  --Continue vancomycin and cefepime for now.  IV fluid resuscitation.      2.  Acute toxic/infectious encephalopathy secondary to above: Slowly improving so lethargic.  Otherwise, no localizing neurologic symptoms.     3.  Pancytopenia with history of myelodysplastic disorder: Oncology input appreciated.  Given progressive anemia, will transfuse 1 unit of packed RBC.   4.  Acute kidney injury, likely due to decreased intake, sepsis:  Continue IV fluid resuscitation.     DVT Prophylaxis: Pneumatic Compression Devices  Code Status: DNR / DNI  Discharge Dispo: tbd  Estimated Disch Date / # of Days until Disch: Suspect 2-5 more days in the hospital.  Time spent: Greater than 35 minutes.  Plan of care discussed with daughter.      Interval History (Subjective):      Limited historian secondary to lethargy and confusion.  Daughter at the bedside.          Physical Exam:      Vital signs:  Temp: 96.2  F (35.7  C) Temp src: Axillary BP: 97/45   Heart Rate:  "98 Resp: 20 SpO2: 96 % O2 Device: Nasal cannula Oxygen Delivery: 4 LPM   Weight: 59.7 kg (131 lb 11.2 oz)  Estimated body mass index is 26.6 kg/(m^2) as calculated from the following:    Height as of 2/18/18: 1.499 m (4' 11\").    Weight as of this encounter: 59.7 kg (131 lb 11.2 oz).     # Pain Assessment:   Current Pain Score 3/3/2018 3/3/2018 3/3/2018   Patient currently in pain? GRIS denies yes   Pain score (0-10) - - -   Pain location - - Arm   Pain descriptors - - Aching         I/O last 3 completed shifts:  In: -   Out: 300 [Urine:300]       Vitals:     03/02/18 2331 03/03/18 0128   Weight: 52 kg (114 lb 10.2 oz) 59.7 kg (131 lb 11.2 oz)         Constitutional:  Lethargic and somnolent.  Otherwise appear comfortable and in no apparent distress   Respiratory: Nl work of breathing.  Crackles right lung base   Cardiovascular: Regular rate and rhythm, normal S1 and S2, and no murmur noted   Abdomen: Normal bowel sounds, soft, non-distended, non-tender   Skin: No rashes, no cyanosis, dry to touch   Neuro: CN 2-12 intact, mobilizes all 4 extremities and responds to pain stimuli   Extremities: No edema, normal range of motion   HEENT Normocephalic, atraumatic, normal nasal turbinates; oropharynx clear   Neck Supple; nl inspection; trachea midline; no thryomegaly   Psychiatric:  Unable           Medications:      All current medications were reviewed with changes reflected in problem list.          Data:      All new lab and imaging data was reviewed.   Labs:     Recent Labs  Lab 03/02/18  2350 03/02/18  2218 03/02/18  2127 02/26/18  0546   CULT PENDING No growth after 15 hours No growth after 15 hours 50,000 to 100,000 colonies/mLmixed urogenital floraSusceptibility testing not routinely done         Recent Labs  Lab 03/03/18  1001 03/03/18  0707 03/02/18  2114 03/02/18  2101   WBC 0.5* 0.5*  --  0.3*   HGB 6.6* 7.0* 18.7* 7.0*   HCT 20.5* 21.3*  --  21.6*   MCV 88 88  --  88   PLT 31* 23*  --  37*         Recent " Labs  Lab 03/03/18  0707 03/02/18  2114 03/02/18  2101 03/02/18  0702    136 136 136   POTASSIUM 4.0 4.5 4.4 4.6   CHLORIDE 106 99 101 100   CO2 25  --  26 26   ANIONGAP 7 10 9 10   * 75 74 95   BUN 87* 75* 86* 65*   CR 2.35*  --  2.67* 2.45*   GFRESTIMATED 19* 13* 17* 19*   GFRESTBLACK 24* 15* 20* 22*   TYREE 8.3*  --  8.7 8.7   PROTTOTAL  --   --  5.8*  --    ALBUMIN  --   --  1.7*  --    BILITOTAL  --   --  1.0  --    ALKPHOS  --   --  152*  --    AST  --   --  258*  --    ALT  --   --  349*  --          Recent Labs  Lab 03/02/18  2350   COLOR Nai   APPEARANCE Cloudy   URINEGLC Negative   URINEBILI Negative   URINEKETONE Negative   SG 1.018   UBLD Negative   URINEPH 5.0   PROTEIN Negative   NITRITE Negative   LEUKEST Negative   RBCU 2   WBCU 3      Imaging:       Recent Results (from the past 24 hour(s))   POC US ABDOMEN LIMITED     Impression     PROCEDURE NOTE --> Emergency Bedside Ultrasound     Procedure Name: Modified RUSH exam     Preformed by: Wilber Qureshi MD     Indication - Hypotension     Probe: low frequency curvilinear probe     Windows - Hepatorenal, Splenorenal, Suprapubic, Subxyphoid, Abdominal Aortic Views, IVC, bilateral lung windows     Findings - No intraperitoneal free fluid, No pericardial effusion, No Pneumothorax, No Abdominal aortic aneurysm, <18mm, >30% collapsibility IVC, grossly normal contractility,      Impression  -hypovolemia preserved contractility no sign of obstructive shock     Images saved on ultrasound internal hard drive per protocol   XR Chest Port 1 View     Narrative     PORTABLE CHEST ONE VIEW   3/2/2018  9:36 PM      HISTORY: Fever.     COMPARISON: 2/17/2018 chest x-ray.     Impression     IMPRESSION: Moderate stable cardiomegaly. Mildly prominent central  pulmonary vasculature. Increased density in the perihilar regions  bilaterally and left lower lobe. These opacities could be caused by  congestive heart failure. Cannot exclude pneumonia in the  right  perihilar region or left lower lobe.     Advanced bilateral shoulder degenerative changes.     ANURADHA SERRANO MD   Abd/pelvis CT no contrast - Stone Protocol     Narrative     CT ABDOMEN AND PELVIS WITHOUT CONTRAST   3/2/2018 10:37 PM      HISTORY: Abdominal pain, fever, altered mental status.     TECHNIQUE: No IV contrast material. Radiation dose for this scan was  reduced using automated exposure control, adjustment of the mA and/or  kV according to patient size, or iterative reconstruction technique.     COMPARISON: CT scan from 6/15/2012.     FINDINGS: Scans through the lung bases demonstrate bibasilar  consolidation suspicious for pneumonia. I suspect there is bilateral  hilar adenopathy as well.     The noncontrast appearance of the liver is normal. There appears to be  a peripherally calcified porcelain type gallbladder with variable  density within it likely sludge or cholesterol gallstones. Polyps not  excluded. The low-density areas within the gallbladder are more  prominent than on the comparison study. Spleen is atrophic. Pancreas  is atrophic but otherwise unremarkable. No adrenal lesions. No kidney  stones or hydronephrosis. No contour deforming renal masses. There is  likely a left mid pole renal cortical cyst     No evidence of abdominal aortic aneurysm or retroperitoneal  adenopathy.     Scans through the pelvis demonstrate calcified uterine fibroid.  Bladder has a normal appearance. No evidence of diverticulitis or  appendicitis. No evidence of bowel obstruction. No free air or free  fluid.     Impression     IMPRESSION:  1. Bibasilar consolidation suspicious for pneumonia. There is likely  bilateral hilar adenopathy as well seen on the images that include a  portion of the lower chest.  2. There is a peripherally calcified porcelain type gallbladder with  mixed density material within it. Two low density round areas are more  prominent than on the comparison study and could represent  cholesterol  gallstones. A polyp of some sort would be difficult to exclude. On  this study, there are two low-density areas in the gallbladder and  previously there was one.  3. No evidence of diverticulitis or appendicitis.     ANURADHA SERRANO MD   Head CT w/o contrast     Narrative     CT SCAN OF THE HEAD WITHOUT CONTRAST   3/2/2018 10:38 PM      HISTORY: Agitation, acute altered mental status.       TECHNIQUE:  Axial images of the head and coronal reformations without  IV contrast material. Radiation dose for this scan was reduced using  automated exposure control, adjustment of the mA and/or kV according  to patient size, or iterative reconstruction technique.     COMPARISON: 2/16/2018.     FINDINGS: There is no evidence of intracranial hemorrhage, mass, acute  infarct or anomaly. There is generalized atrophy of the brain. There  is low attenuation in the white matter of the cerebral hemispheres  consistent with sequelae of small vessel ischemic disease.      The visualized portions of the sinuses and mastoids appear normal. The  bony calvarium and bones of the skull base appear intact. Bilateral  pseudophakia.     Impression     IMPRESSION:     1. No evidence of acute intracranial hemorrhage, mass, or herniation.  2. There is generalized atrophy of the brain. White matter changes are  present in the cerebral hemispheres that are consistent with small  vessel ischemic disease in this age patient.        MD Jerica MALIK -[phone]

## 2018-03-04 NOTE — CONSULTS
[facility]  Hospitalist Progress Note  Name: Josy Thomson    MRN: [id number]  YOB: 1927    Age: 89 year old  Date of admission: 3/2/2018  Primary care provider: Abeba Bradford      Reason for Stay (Diagnosis): Healthcare associated pneumonia/neutropenic fever         Assessment and Plan:      Summary of Stay:  Josy Thomson is a 89 year old female patient with past medical history of myelodysplastic disorder, anemia, hypertension, arthritis, recent influenza infection, was brought from transitional care unit for evaluation for change in mental status.  History was obtained from patient's daughters.  Patient was febrile and tachycardic.  Laboratory workup showed WBC 0.3, creatinine 3.4.  CT of the abdomen/pelvis showed bibasilar consolidation suspicious for pneumonia.  She was given IV vancomycin and cefepime.  She was admitted to [facility] [place] for further management.     1.  Healthcare associated pneumonia/neutropenic fever.  --Continue vancomycin and cefepime for now.  IV fluid resuscitation.     2.  Acute toxic/infectious encephalopathy secondary to above: Slowly improving so lethargic.  Otherwise, no localizing neurologic symptoms.    3.  Pancytopenia with history of myelodysplastic disorder: Oncology input appreciated.  Given progressive anemia, will transfuse 1 unit of packed RBC.   4.  Acute kidney injury, likely due to decreased intake, sepsis:  Continue IV fluid resuscitation.     DVT Prophylaxis: Pneumatic Compression Devices  Code Status: DNR / DNI  Discharge Dispo: tbd  Estimated Disch Date / # of Days until Disch: Suspect 2-5 more days in the hospital.  Time spent: Greater than 35 minutes.  Plan of care discussed with daughter.      Interval History (Subjective):      Limited historian secondary to lethargy and confusion.  Daughter at the bedside.         Physical Exam:      Vital signs:  Temp: 96.2  F (35.7  C) Temp src: Axillary BP: 97/45   Heart Rate: 98 Resp:  "20 SpO2: 96 % O2 Device: Nasal cannula Oxygen Delivery: 4 LPM   Weight: 59.7 kg (131 lb 11.2 oz)  Estimated body mass index is 26.6 kg/(m^2) as calculated from the following:    Height as of 2/18/18: 1.499 m (4' 11\").    Weight as of this encounter: 59.7 kg (131 lb 11.2 oz).    # Pain Assessment:   Current Pain Score 3/3/2018 3/3/2018 3/3/2018   Patient currently in pain? GRIS denies yes   Pain score (0-10) - - -   Pain location - - Arm   Pain descriptors - - Aching       I/O last 3 completed shifts:  In: -   Out: 300 [Urine:300]  Vitals:    03/02/18 2331 03/03/18 0128   Weight: 52 kg (114 lb 10.2 oz) 59.7 kg (131 lb 11.2 oz)       Constitutional:  Lethargic and somnolent.  Otherwise appear comfortable and in no apparent distress   Respiratory: Nl work of breathing.  Crackles right lung base   Cardiovascular: Regular rate and rhythm, normal S1 and S2, and no murmur noted   Abdomen: Normal bowel sounds, soft, non-distended, non-tender   Skin: No rashes, no cyanosis, dry to touch   Neuro: CN 2-12 intact, mobilizes all 4 extremities and responds to pain stimuli   Extremities: No edema, normal range of motion   HEENT Normocephalic, atraumatic, normal nasal turbinates; oropharynx clear   Neck Supple; nl inspection; trachea midline; no thryomegaly   Psychiatric:  Unable          Medications:      All current medications were reviewed with changes reflected in problem list.         Data:      All new lab and imaging data was reviewed.   Labs:    Recent Labs  Lab 03/02/18  2350 03/02/18  2218 03/02/18  2127 02/26/18  0546   CULT PENDING No growth after 15 hours No growth after 15 hours 50,000 to 100,000 colonies/mLmixed urogenital floraSusceptibility testing not routinely done       Recent Labs  Lab 03/03/18  1001 03/03/18  0707 03/02/18  2114 03/02/18  2101   WBC 0.5* 0.5*  --  0.3*   HGB 6.6* 7.0* 18.7* 7.0*   HCT 20.5* 21.3*  --  21.6*   MCV 88 88  --  88   PLT 31* 23*  --  37*       Recent Labs  Lab 03/03/18  0707 " 03/02/18  2114 03/02/18  2101 03/02/18  0702    136 136 136   POTASSIUM 4.0 4.5 4.4 4.6   CHLORIDE 106 99 101 100   CO2 25  --  26 26   ANIONGAP 7 10 9 10   * 75 74 95   BUN 87* 75* 86* 65*   CR 2.35*  --  2.67* 2.45*   GFRESTIMATED 19* 13* 17* 19*   GFRESTBLACK 24* 15* 20* 22*   TYREE 8.3*  --  8.7 8.7   PROTTOTAL  --   --  5.8*  --    ALBUMIN  --   --  1.7*  --    BILITOTAL  --   --  1.0  --    ALKPHOS  --   --  152*  --    AST  --   --  258*  --    ALT  --   --  349*  --        Recent Labs  Lab 03/02/18  2350   COLOR Nai   APPEARANCE Cloudy   URINEGLC Negative   URINEBILI Negative   URINEKETONE Negative   SG 1.018   UBLD Negative   URINEPH 5.0   PROTEIN Negative   NITRITE Negative   LEUKEST Negative   RBCU 2   WBCU 3      Imaging:   Recent Results (from the past 24 hour(s))   POC US ABDOMEN LIMITED    Impression    PROCEDURE NOTE --> Emergency Bedside Ultrasound    Procedure Name: Modified RUSH exam    Preformed by: Wilber Qureshi MD    Indication - Hypotension    Probe: low frequency curvilinear probe    Windows - Hepatorenal, Splenorenal, Suprapubic, Subxyphoid, Abdominal Aortic Views, IVC, bilateral lung windows    Findings - No intraperitoneal free fluid, No pericardial effusion, No Pneumothorax, No Abdominal aortic aneurysm, <18mm, >30% collapsibility IVC, grossly normal contractility,     Impression  -hypovolemia preserved contractility no sign of obstructive shock    Images saved on ultrasound internal hard drive per protocol   XR Chest Port 1 View    Narrative    PORTABLE CHEST ONE VIEW   3/2/2018  9:36 PM     HISTORY: Fever.    COMPARISON: 2/17/2018 chest x-ray.      Impression    IMPRESSION: Moderate stable cardiomegaly. Mildly prominent central  pulmonary vasculature. Increased density in the perihilar regions  bilaterally and left lower lobe. These opacities could be caused by  congestive heart failure. Cannot exclude pneumonia in the right  perihilar region or left lower  lobe.    Advanced bilateral shoulder degenerative changes.    ANURADHA SERRANO MD   Abd/pelvis CT no contrast - Stone Protocol    Narrative    CT ABDOMEN AND PELVIS WITHOUT CONTRAST   3/2/2018 10:37 PM     HISTORY: Abdominal pain, fever, altered mental status.    TECHNIQUE: No IV contrast material. Radiation dose for this scan was  reduced using automated exposure control, adjustment of the mA and/or  kV according to patient size, or iterative reconstruction technique.    COMPARISON: CT scan from 6/15/2012.    FINDINGS: Scans through the lung bases demonstrate bibasilar  consolidation suspicious for pneumonia. I suspect there is bilateral  hilar adenopathy as well.    The noncontrast appearance of the liver is normal. There appears to be  a peripherally calcified porcelain type gallbladder with variable  density within it likely sludge or cholesterol gallstones. Polyps not  excluded. The low-density areas within the gallbladder are more  prominent than on the comparison study. Spleen is atrophic. Pancreas  is atrophic but otherwise unremarkable. No adrenal lesions. No kidney  stones or hydronephrosis. No contour deforming renal masses. There is  likely a left mid pole renal cortical cyst    No evidence of abdominal aortic aneurysm or retroperitoneal  adenopathy.    Scans through the pelvis demonstrate calcified uterine fibroid.  Bladder has a normal appearance. No evidence of diverticulitis or  appendicitis. No evidence of bowel obstruction. No free air or free  fluid.      Impression    IMPRESSION:  1. Bibasilar consolidation suspicious for pneumonia. There is likely  bilateral hilar adenopathy as well seen on the images that include a  portion of the lower chest.  2. There is a peripherally calcified porcelain type gallbladder with  mixed density material within it. Two low density round areas are more  prominent than on the comparison study and could represent cholesterol  gallstones. A polyp of some sort would be  difficult to exclude. On  this study, there are two low-density areas in the gallbladder and  previously there was one.  3. No evidence of diverticulitis or appendicitis.    ANURADHA SERRANO MD   Head CT w/o contrast    Narrative    CT SCAN OF THE HEAD WITHOUT CONTRAST   3/2/2018 10:38 PM     HISTORY: Agitation, acute altered mental status.      TECHNIQUE:  Axial images of the head and coronal reformations without  IV contrast material. Radiation dose for this scan was reduced using  automated exposure control, adjustment of the mA and/or kV according  to patient size, or iterative reconstruction technique.    COMPARISON: 2/16/2018.    FINDINGS: There is no evidence of intracranial hemorrhage, mass, acute  infarct or anomaly. There is generalized atrophy of the brain. There  is low attenuation in the white matter of the cerebral hemispheres  consistent with sequelae of small vessel ischemic disease.     The visualized portions of the sinuses and mastoids appear normal. The  bony calvarium and bones of the skull base appear intact. Bilateral  pseudophakia.      Impression    IMPRESSION:     1. No evidence of acute intracranial hemorrhage, mass, or herniation.  2. There is generalized atrophy of the brain. White matter changes are  present in the cerebral hemispheres that are consistent with small  vessel ischemic disease in this age patient.      MD Jerica MALIK -[phone]

## 2018-03-05 NOTE — PROGRESS NOTES
Oncology/Hematology Follow Up Note:    Assessment and Plan:    Josy Thomson is a 89 year old female who was admitted on 3/2/2018.         Myelodysplasia  -followed by me.  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    PLAN:  I d/w daughter via phone and had a long chat with her, about goals of care.  - She is very open, to palliative care.  - She wants comfort care for her mom.      Pancytopenia  -due to myelodysplasia  -renal failure may be contributing to anemia  -continue erythropoietin   -ok for red cell transfusion as ordered   -continue red cell transfusions for hemoglobin <7 and platelets for platelet < 10,000 or bleeding  -can consider short term addition of neupogen if fever persists  -monitor CBC serially    PLAN:  No PRBC today.      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      Labs:  All labs reviewed    CBC  Recent Labs  Lab 03/05/18  0623 03/04/18  0652 03/03/18  1001   WBC 0.8* 0.6* 0.5*   HGB 7.2* 7.7* 6.6*   MCV 88 86 88   PLT 16* 18* 31*       CMP  Recent Labs  Lab 03/05/18  0623 03/04/18  0652  03/03/18  0707 03/02/18  2114 03/02/18  2101   * 145* 138 136 136   POTASSIUM 3.6 3.6 4.0 4.5 4.4   CHLORIDE 118* 113* 106 99 101   CO2 23 23 25  --  26   ANIONGAP 7 9 7 10 9   * 133* 103* 75 74   BUN 88* 86* 87* 75* 86*   CR 1.86* 2.03* 2.35*  --  2.67*   GFRESTIMATED 25* 23* 19* 13* 17*   GFRESTBLACK 31* 28* 24* 15* 20*   TYREE 8.6 8.2* 8.3*  --  8.7   PROTTOTAL  --   --   --   --  5.8*   ALBUMIN  --   --   --   --  1.7*   BILITOTAL  --   --   --   --  1.0   ALKPHOS  --   --   --   --  152*   AST  --   --   --   --  258*   ALT  --   --   --   --  349*       INRNo lab results found in last 7 days.    Blood Culture  Recent Labs  Lab 03/02/18  2350 03/02/18  2218 03/02/18  2127   CULT <1000 colonies/mLurogenital [place] No growth after 2 days No growth after 2 days         Katelyn Morrissey MD  [facility]  3/5/2018 9:02 AM

## 2018-03-05 NOTE — PROGRESS NOTES
[facility]  Hospitalist Progress Note  Name: Josy Thomson    MRN: [id number]  YOB: 1927    Age: 89 year old  Date of admission: 3/2/2018  Primary care provider: Abeba Bradford        Reason for Stay (Diagnosis): Healthcare associated pneumonia/neutropenic fever          Assessment and Plan:      Summary of Stay:  Josy Thomson is a 89 year old female patient with past medical history of myelodysplastic disorder, anemia, hypertension, arthritis, recent influenza infection, was brought from transitional care unit for evaluation for change in mental status.  History was obtained from patient's daughters.  Patient was febrile and tachycardic.  Laboratory workup showed WBC 0.3, creatinine 3.4.  CT of the abdomen/pelvis showed bibasilar consolidation suspicious for pneumonia.  She was given IV vancomycin and cefepime.  She was admitted to [facility] for further management.  Okay to transfer to [facility] on 3/4/18.      1.  Healthcare associated pneumonia/neutropenic fever.  --Continue vancomycin and cefepime for now.  IV fluid resuscitation.      2.  Acute toxic/infectious encephalopathy secondary to above: Slowly improving so lethargic.  Otherwise, no localizing neurologic symptoms.     3.  Pancytopenia with history of myelodysplastic disorder: Oncology input appreciated.  Given progressive anemia.  Status post 1 unit of packed RBC on 3/3/18 with good results.  No evidence of acute blood loss anemia.    4.  Acute kidney injury, likely due to decreased intake, sepsis:  Continue IV fluid resuscitation.    5.  Will request palliative care consultation to discuss long-term goals as prognosis long-term is guarded     DVT Prophylaxis: Pneumatic Compression Devices  Code Status: DNR / DNI  Discharge Dispo: tbd  Estimated Disch Date / # of Days until Disch: Suspect 2-3 more days in the hospital.  Time spent: Greater than 35 minutes.  Plan of care discussed with daughter.       "Interval History (Subjective):      Limited historian secondary to lethargy and confusion.            Physical Exam:      Vital signs:  Temp: 96.2  F (35.7  C) Temp src: Axillary BP: 97/45   Heart Rate: 98 Resp: 20 SpO2: 96 % O2 Device: Nasal cannula Oxygen Delivery: 4 LPM   Weight: 59.7 kg (131 lb 11.2 oz)  Estimated body mass index is 26.6 kg/(m^2) as calculated from the following:    Height as of 2/18/18: 1.499 m (4' 11\").    Weight as of this encounter: 59.7 kg (131 lb 11.2 oz).     # Pain Assessment:   Current Pain Score 3/3/2018 3/3/2018 3/3/2018   Patient currently in pain? GRIS denies yes   Pain score (0-10) - - -   Pain location - - Arm   Pain descriptors - - Aching         I/O last 3 completed shifts:  In: -   Out: 300 [Urine:300]       Vitals:     03/02/18 2331 03/03/18 0128   Weight: 52 kg (114 lb 10.2 oz) 59.7 kg (131 lb 11.2 oz)         Constitutional:  Lethargic and somnolent.  Otherwise appear comfortable and in no apparent distress   Respiratory: Nl work of breathing.  Crackles right lung base   Cardiovascular: Regular rate and rhythm, normal S1 and S2, and no murmur noted   Abdomen: Normal bowel sounds, soft, non-distended, non-tender   Skin: No rashes, no cyanosis, dry to touch   Neuro: CN 2-12 intact, mobilizes all 4 extremities and responds to pain stimuli   Extremities: No edema, normal range of motion   HEENT Normocephalic, atraumatic, normal nasal turbinates; oropharynx clear   Neck Supple; nl inspection; trachea midline; no thryomegaly   Psychiatric:  Unable           Medications:      All current medications were reviewed with changes reflected in problem list.          Data:      All new lab and imaging data was reviewed.   Labs:     Recent Labs  Lab 03/02/18  2350 03/02/18  2218 03/02/18 2127 02/26/18  0546   CULT PENDING No growth after 15 hours No growth after 15 hours 50,000 to 100,000 colonies/mLmixed urogenital floraSusceptibility testing not routinely done         Recent Labs  Lab " 03/03/18  1001 03/03/18  0707 03/02/18 2114 03/02/18  2101   WBC 0.5* 0.5*  --  0.3*   HGB 6.6* 7.0* 18.7* 7.0*   HCT 20.5* 21.3*  --  21.6*   MCV 88 88  --  88   PLT 31* 23*  --  37*         Recent Labs  Lab 03/03/18  0707 03/02/18 2114 03/02/18  2101 03/02/18  0702    136 136 136   POTASSIUM 4.0 4.5 4.4 4.6   CHLORIDE 106 99 101 100   CO2 25  --  26 26   ANIONGAP 7 10 9 10   * 75 74 95   BUN 87* 75* 86* 65*   CR 2.35*  --  2.67* 2.45*   GFRESTIMATED 19* 13* 17* 19*   GFRESTBLACK 24* 15* 20* 22*   TYREE 8.3*  --  8.7 8.7   PROTTOTAL  --   --  5.8*  --    ALBUMIN  --   --  1.7*  --    BILITOTAL  --   --  1.0  --    ALKPHOS  --   --  152*  --    AST  --   --  258*  --    ALT  --   --  349*  --          Recent Labs  Lab 03/02/18  2350   COLOR Nai   APPEARANCE Cloudy   URINEGLC Negative   URINEBILI Negative   URINEKETONE Negative   SG 1.018   UBLD Negative   URINEPH 5.0   PROTEIN Negative   NITRITE Negative   LEUKEST Negative   RBCU 2   WBCU 3      Imaging:       Recent Results (from the past 24 hour(s))   POC US ABDOMEN LIMITED     Impression     PROCEDURE NOTE --> Emergency Bedside Ultrasound     Procedure Name: Modified RUS exam     Preformed by: Wilber Qureshi MD     Indication - Hypotension     Probe: low frequency curvilinear probe     Windows - Hepatorenal, Splenorenal, Suprapubic, Subxyphoid, Abdominal Aortic Views, IVC, bilateral lung windows     Findings - No intraperitoneal free fluid, No pericardial effusion, No Pneumothorax, No Abdominal aortic aneurysm, <18mm, >30% collapsibility IVC, grossly normal contractility,      Impression  -hypovolemia preserved contractility no sign of obstructive shock     Images saved on ultrasound internal hard drive per protocol   XR Chest Port 1 View     Narrative     PORTABLE CHEST ONE VIEW   3/2/2018  9:36 PM      HISTORY: Fever.     COMPARISON: 2/17/2018 chest x-ray.     Impression     IMPRESSION: Moderate stable cardiomegaly. Mildly prominent  central  pulmonary vasculature. Increased density in the perihilar regions  bilaterally and left lower lobe. These opacities could be caused by  congestive heart failure. Cannot exclude pneumonia in the right  perihilar region or left lower lobe.     Advanced bilateral shoulder degenerative changes.     ANURADHA SERRANO MD   Abd/pelvis CT no contrast - Stone Protocol     Narrative     CT ABDOMEN AND PELVIS WITHOUT CONTRAST   3/2/2018 10:37 PM      HISTORY: Abdominal pain, fever, altered mental status.     TECHNIQUE: No IV contrast material. Radiation dose for this scan was  reduced using automated exposure control, adjustment of the mA and/or  kV according to patient size, or iterative reconstruction technique.     COMPARISON: CT scan from 6/15/2012.     FINDINGS: Scans through the lung bases demonstrate bibasilar  consolidation suspicious for pneumonia. I suspect there is bilateral  hilar adenopathy as well.     The noncontrast appearance of the liver is normal. There appears to be  a peripherally calcified porcelain type gallbladder with variable  density within it likely sludge or cholesterol gallstones. Polyps not  excluded. The low-density areas within the gallbladder are more  prominent than on the comparison study. Spleen is atrophic. Pancreas  is atrophic but otherwise unremarkable. No adrenal lesions. No kidney  stones or hydronephrosis. No contour deforming renal masses. There is  likely a left mid pole renal cortical cyst     No evidence of abdominal aortic aneurysm or retroperitoneal  adenopathy.     Scans through the pelvis demonstrate calcified uterine fibroid.  Bladder has a normal appearance. No evidence of diverticulitis or  appendicitis. No evidence of bowel obstruction. No free air or free  fluid.     Impression     IMPRESSION:  1. Bibasilar consolidation suspicious for pneumonia. There is likely  bilateral hilar adenopathy as well seen on the images that include a  portion of the lower chest.  2.  There is a peripherally calcified porcelain type gallbladder with  mixed density material within it. Two low density round areas are more  prominent than on the comparison study and could represent cholesterol  gallstones. A polyp of some sort would be difficult to exclude. On  this study, there are two low-density areas in the gallbladder and  previously there was one.  3. No evidence of diverticulitis or appendicitis.     ANURADHA SERRANO MD   Head CT w/o contrast     Narrative     CT SCAN OF THE HEAD WITHOUT CONTRAST   3/2/2018 10:38 PM      HISTORY: Agitation, acute altered mental status.       TECHNIQUE:  Axial images of the head and coronal reformations without  IV contrast material. Radiation dose for this scan was reduced using  automated exposure control, adjustment of the mA and/or kV according  to patient size, or iterative reconstruction technique.     COMPARISON: 2/16/2018.     FINDINGS: There is no evidence of intracranial hemorrhage, mass, acute  infarct or anomaly. There is generalized atrophy of the brain. There  is low attenuation in the white matter of the cerebral hemispheres  consistent with sequelae of small vessel ischemic disease.      The visualized portions of the sinuses and mastoids appear normal. The  bony calvarium and bones of the skull base appear intact. Bilateral  pseudophakia.     Impression     IMPRESSION:     1. No evidence of acute intracranial hemorrhage, mass, or herniation.  2. There is generalized atrophy of the brain. White matter changes are  present in the cerebral hemispheres that are consistent with small  vessel ischemic disease in this age patient.        MD Jerica MALIK -[phone]

## 2018-03-05 NOTE — PROGRESS NOTES
[facility]  Hospitalist Progress Note  Name: Josy Thomson    MRN: [id number]  YOB: 1927    Age: 89 year old  Date of admission: 3/2/2018  Primary care provider: Abeba Bradford        Reason for Stay (Diagnosis): Healthcare associated pneumonia/neutropenic fever          Assessment and Plan:      Summary of Stay:  Josy Thomson is a 89 year old female patient with past medical history of myelodysplastic disorder, anemia, hypertension, arthritis, recent influenza infection, was brought from transitional care unit for evaluation for change in mental status.  History was obtained from patient's daughters.  Patient was febrile and tachycardic.  Laboratory workup showed WBC 0.3, creatinine 3.4.  CT of the abdomen/pelvis showed bibasilar consolidation suspicious for pneumonia.  She was given IV vancomycin and cefepime.  She was admitted to [facility] [place] [place] for further management.  Okay to transfer to [facility] on 3/4/18.      1.  Healthcare associated pneumonia/neutropenic fever.  --Met with both daughters in addition with palliative care.  Plan now is to transitioned to comfort measures so we will discontinue vancomycin and cefepime.      2.  Acute toxic/infectious encephalopathy secondary to above:  Focus now only on comfort cares.     3.  Pancytopenia with history of myelodysplastic disorder: Oncology input appreciated.  Guarded to poor prognosis.  No further transfusions for CBC checks.    4.  Acute kidney injury, likely due to decreased intake, sepsis:  C no further workup.  Will discontinue IV fluids.    5.  Palliative care assistance appreciated.     DVT Prophylaxis: Pneumatic Compression Devices  Code Status: DNR / DNI  Discharge Dispo:   consultation.  Per daughter's, will likely need long-term care or residential hospice with hospice support at discharge.  Estimated Disch Date / # of Days until Disch:  In 1-2 days depending on patient's progress on comfort  "measures.  Time spent: Greater than 35 minutes.  Plan of care discussed with daughters.      Interval History (Subjective):      Limited historian secondary to lethargy and confusion.            Physical Exam:      Vital signs:  Temp: 96.2  F (35.7  C) Temp src: Axillary BP: 97/45   Heart Rate: 98 Resp: 20 SpO2: 96 % O2 Device: Nasal cannula Oxygen Delivery: 4 LPM   Weight: 59.7 kg (131 lb 11.2 oz)  Estimated body mass index is 26.6 kg/(m^2) as calculated from the following:    Height as of 2/18/18: 1.499 m (4' 11\").    Weight as of this encounter: 59.7 kg (131 lb 11.2 oz).     # Pain Assessment:   Current Pain Score 3/3/2018 3/3/2018 3/3/2018   Patient currently in pain? GRIS denies yes   Pain score (0-10) - - -   Pain location - - Arm   Pain descriptors - - Aching         I/O last 3 completed shifts:  In: -   Out: 300 [Urine:300]       Vitals:     03/02/18 2331 03/03/18 0128   Weight: 52 kg (114 lb 10.2 oz) 59.7 kg (131 lb 11.2 oz)         Constitutional:  Lethargic and somnolent.  Otherwise appear comfortable and in no apparent distress   Respiratory: Nl work of breathing.  Crackles right lung base   Cardiovascular: Regular rate and rhythm, normal S1 and S2, and no murmur noted   Abdomen: Normal bowel sounds, soft, non-distended, non-tender   Skin: No rashes, no cyanosis, dry to touch   Neuro: CN 2-12 intact, mobilizes all 4 extremities and responds to pain stimuli   Extremities: No edema, normal range of motion   HEENT Normocephalic, atraumatic, normal nasal turbinates; oropharynx clear   Neck Supple; nl inspection; trachea midline; no thryomegaly   Psychiatric:  Unable           Medications:      All current medications were reviewed with changes reflected in problem list.          Data:      All new lab and imaging data was reviewed.   Labs:     Recent Labs  Lab 03/02/18  2350 03/02/18  2218 03/02/18  2127 02/26/18  0546   CULT PENDING No growth after 15 hours No growth after 15 hours 50,000 to 100,000 " colonies/mLmixed urogenital floraSusceptibility testing not routinely done         Recent Labs  Lab 03/03/18  1001 03/03/18  0707 03/02/18 2114 03/02/18 2101   WBC 0.5* 0.5*  --  0.3*   HGB 6.6* 7.0* 18.7* 7.0*   HCT 20.5* 21.3*  --  21.6*   MCV 88 88  --  88   PLT 31* 23*  --  37*         Recent Labs  Lab 03/03/18  0707 03/02/18 2114 03/02/18 2101 03/02/18  0702    136 136 136   POTASSIUM 4.0 4.5 4.4 4.6   CHLORIDE 106 99 101 100   CO2 25  --  26 26   ANIONGAP 7 10 9 10   * 75 74 95   BUN 87* 75* 86* 65*   CR 2.35*  --  2.67* 2.45*   GFRESTIMATED 19* 13* 17* 19*   GFRESTBLACK 24* 15* 20* 22*   TYREE 8.3*  --  8.7 8.7   PROTTOTAL  --   --  5.8*  --    ALBUMIN  --   --  1.7*  --    BILITOTAL  --   --  1.0  --    ALKPHOS  --   --  152*  --    AST  --   --  258*  --    ALT  --   --  349*  --          Recent Labs  Lab 03/02/18  2350   COLOR Nai   APPEARANCE Cloudy   URINEGLC Negative   URINEBILI Negative   URINEKETONE Negative   SG 1.018   UBLD Negative   URINEPH 5.0   PROTEIN Negative   NITRITE Negative   LEUKEST Negative   RBCU 2   WBCU 3      Imaging:       Recent Results (from the past 24 hour(s))   POC US ABDOMEN LIMITED     Impression     PROCEDURE NOTE --> Emergency Bedside Ultrasound     Procedure Name: Modified RUSH exam     Preformed by: Wilber Qureshi MD     Indication - Hypotension     Probe: low frequency curvilinear probe     Windows - Hepatorenal, Splenorenal, Suprapubic, Subxyphoid, Abdominal Aortic Views, IVC, bilateral lung windows     Findings - No intraperitoneal free fluid, No pericardial effusion, No Pneumothorax, No Abdominal aortic aneurysm, <18mm, >30% collapsibility IVC, grossly normal contractility,      Impression  -hypovolemia preserved contractility no sign of obstructive shock     Images saved on ultrasound internal hard drive per protocol   XR Chest Port 1 View     Narrative     PORTABLE CHEST ONE VIEW   3/2/2018  9:36 PM      HISTORY: Fever.     COMPARISON: 2/17/2018  chest x-ray.     Impression     IMPRESSION: Moderate stable cardiomegaly. Mildly prominent central  pulmonary vasculature. Increased density in the perihilar regions  bilaterally and left lower lobe. These opacities could be caused by  congestive heart failure. Cannot exclude pneumonia in the right  perihilar region or left lower lobe.     Advanced bilateral shoulder degenerative changes.     ANURADHA SERRANO MD   Abd/pelvis CT no contrast - Stone Protocol     Narrative     CT ABDOMEN AND PELVIS WITHOUT CONTRAST   3/2/2018 10:37 PM      HISTORY: Abdominal pain, fever, altered mental status.     TECHNIQUE: No IV contrast material. Radiation dose for this scan was  reduced using automated exposure control, adjustment of the mA and/or  kV according to patient size, or iterative reconstruction technique.     COMPARISON: CT scan from 6/15/2012.     FINDINGS: Scans through the lung bases demonstrate bibasilar  consolidation suspicious for pneumonia. I suspect there is bilateral  hilar adenopathy as well.     The noncontrast appearance of the liver is normal. There appears to be  a peripherally calcified porcelain type gallbladder with variable  density within it likely sludge or cholesterol gallstones. Polyps not  excluded. The low-density areas within the gallbladder are more  prominent than on the comparison study. Spleen is atrophic. Pancreas  is atrophic but otherwise unremarkable. No adrenal lesions. No kidney  stones or hydronephrosis. No contour deforming renal masses. There is  likely a left mid pole renal cortical cyst     No evidence of abdominal aortic aneurysm or retroperitoneal  adenopathy.     Scans through the pelvis demonstrate calcified uterine fibroid.  Bladder has a normal appearance. No evidence of diverticulitis or  appendicitis. No evidence of bowel obstruction. No free air or free  fluid.     Impression     IMPRESSION:  1. Bibasilar consolidation suspicious for pneumonia. There is likely  bilateral  hilar adenopathy as well seen on the images that include a  portion of the lower chest.  2. There is a peripherally calcified porcelain type gallbladder with  mixed density material within it. Two low density round areas are more  prominent than on the comparison study and could represent cholesterol  gallstones. A polyp of some sort would be difficult to exclude. On  this study, there are two low-density areas in the gallbladder and  previously there was one.  3. No evidence of diverticulitis or appendicitis.     ANURADHA SERRANO MD   Head CT w/o contrast     Narrative     CT SCAN OF THE HEAD WITHOUT CONTRAST   3/2/2018 10:38 PM      HISTORY: Agitation, acute altered mental status.       TECHNIQUE:  Axial images of the head and coronal reformations without  IV contrast material. Radiation dose for this scan was reduced using  automated exposure control, adjustment of the mA and/or kV according  to patient size, or iterative reconstruction technique.     COMPARISON: 2/16/2018.     FINDINGS: There is no evidence of intracranial hemorrhage, mass, acute  infarct or anomaly. There is generalized atrophy of the brain. There  is low attenuation in the white matter of the cerebral hemispheres  consistent with sequelae of small vessel ischemic disease.      The visualized portions of the sinuses and mastoids appear normal. The  bony calvarium and bones of the skull base appear intact. Bilateral  pseudophakia.     Impression     IMPRESSION:     1. No evidence of acute intracranial hemorrhage, mass, or herniation.  2. There is generalized atrophy of the brain. White matter changes are  present in the cerebral hemispheres that are consistent with small  vessel ischemic disease in this age patient.        MD Jerica MALIK -[phone]

## 2018-03-05 NOTE — CONSULTS
Care Transition Initial Assessment -   Reason For Consult: care coordination/care conference, community resources, discharge planning  Met with: Patient and Family    Active Problems:    HCAP (healthcare-associated pneumonia)       DATA  Lives With: alone  Living Arrangements: house  Description of Support System: Supportive, Involved  Who is your support system?: Children, Facility resident(s)/Staff  Support Assessment: Lacks necessary supervision and assistance, Lacks adequate physical care, Caregiver experiencing high stress, Caregiver difficulty providing physical care/safety.   Identified issues/concerns regarding health management: Pt admitted from TCU and now moving to comfort care with Hospice       Resources List: Skilled Nursing Facility, came from [facility],. Would like to return if bed available    Hospice- set up meeting with Hospice for tomorrow @10 am         Transportation Available: will need stretcher transport   ASSESSMENT  Pt is not alert. Met with pt's daughters. They are not comfortable with taking pt home for Hospice support. They are willing to have pt return to NH level of care with support of Hospice. They met with a provider at NH facility but not comfortable with that program   Set up meeting for tomorrow with [facility]      PLAN  Financial costs for the patient includes $5000 deposit for Placement with Hospice support  .  Patient given options and choices for discharge yes .  Will follow up with family following Hospice meeting.  Will need stretcher transport

## 2018-03-05 NOTE — PROGRESS NOTES
SPIRITUAL HEALTH SERVICES Progress Note  [facility] Med/Surg 3rd floor     consult per palliative team request. Chart review indicates that patient, Josy, has transitioned to comfort cares and has been lethargic and confused recently.   During my visit, Josy, did not open her eyes, and would occasionally mumble, but it was unintelligable. Review of previous  notes indicates that pt's dtr, Jazmine, has an overt Quaker spirituality and that Josy tends to hold her Quaker domitila more privately.    With this in mind, and being unable to communicate with Josy directly during this visit, simply sat with Josy and provided compassionate presence and offered silent prayers.    Family to meet with hospice tomorrow at 10 a.m. Will attempt to f/u with pt's daughters at that time to assess their emotional/spiritual needs as well and discuss what else Josy may find comforting at this time.  I and the other chaplains remain available per pt/family/staff need or request.     LARS Burrell.  Staff    Pager #[phone]

## 2018-03-05 NOTE — CONSULTS
"[facility]    Palliative Care Consultation   Text Page    Date of Admission:  3/2/2018    Assessment & Plan   Josy Thomson is a 89 year old female who was admitted on 3/2/2018. I was asked to see the patient for goals of care.    Recommendations:  1. Pain - tylenol 500 mg PO or 650 mg PA q 4 hours prn mild pain or fever. Voltaren 1% gel to upper arms and shoulders QID. Hydromorphone 2 mg SL q 4 hours WA and q 2 hours prn pain or dyspnea with respiratory rate greater than 20. Hydromorphone 0.3-0.5 mg IV q 2 hours prn pain if sublingual prn doses ineffective. Position for comfort. Pt has increased pain with lifting, moving of arms and shoulders.  2. Dyspnea-  See hydromorphone in #1. o2 at 2-4 l/min prn dyspnea. Fan at bedside. Lorazepam 0.5-1 mg SL q 3 hours prn. Atropine 1% 1-2 gtts q 1 hour prn secretions. Albuterol neb q 4 hours prn.  3. Delirium - Haldol 0.5-1 mg SL q 6 hours prn agitation. Nursing delirium interventions as appropriate.    Goal of Care: DNR DNI. No feeding tube. Comfort Care. Pt does not display medical decision-making capacity. Dtr Jazmine is HCA. Dtr Karla also present. They verbalize that pt would not want to have life prolonging care and would like cares that promote pt comfort. \"We don't want her to have artificial treatments, just allow the natural process and keep her comfortable\".  Appreciate SWS Corinne assistance to coordinate meeting with hospice and work with dtrs to determine discharge facility. If pt survives, will discharge to facility with hospice care. Meeting set for 10 am 3/6 with  hospice.    Disease Process/es & Symptoms:  Josy Thomson is a 89 year old patient admitted with symptoms of progressive confusion and agitation. She has been treated for pneumonia, recent influenza, encephalopathy, MDS with anemia, neutropenia, and thrombocytopenia, acute kidney injury .      This is in the setting of MDS dx 10/2015 receiving bimonthly transfusions of prbcs, epo but " "no other therapy due to performance status, HTN, arthritis, recent influenza, CKD, chronic pain, osteoarthritis, depression, GIB, anemia due to vit B12 deficiency.  She has been hospitalized 2 times in the past 1 month for recurrent confusion, altered mental status . Patient has moved to inpatient care from TCU for higher level of care and needs help with at least one ADL. There is a documented unitentional weight loss of 7 pounds over the past 12 months.    The following symptoms are noted by patient's daughters  as concerning to her quality of life.  Chronic pain  Dyspnea  Confusion  Deconditioning and loss of independence    Psychosocial/Spiritual Needs:  Pt is Gnosticism and Gnosticism  Oriented to Spiritual Health and Social Work Services as part of Palliative Care team.  is following. Consultation placed for  to follow.    Decision-Making & Goals of Care:  Discussion/counseling today about goals of care/decisions:   3/5/2018  Met at pt's bedside with dtcassie Lucero and dtr Karla. They share that pt has been a very independent, strong person and has been able to live in her home for over 56 years up to her TCU admission after increased falling. \"Those falls really take it out of a person\".   They note that pt verbalized to a  at one point that she misses being able to sit in the sun, and was resentful to not be independent in her living any more. She \" was angry at me [daughter] for bringing her to the hospital earlier this month . . . She felt betrayed\". Jazmine and Karla verbalized that they have had the chance to speak with Dr. Morrissey and her partner, and the hospitalist about pt's condition. They understand that pt had influenza, and now has PNA, PAXTON in addition to her MDS with dependence on blood transfusions, and progressive decline in her mental status despite treatment. Jazmine states \"Mom never wanted to have all of these artificial things done to her . . . We want her to be kept " "comfortable\". We discussed artificial hydration. Pt is congested at present, in PAXTON/CKD with increasing swelling in her hands. It is likely that additional IV fluids would increase her respiratory congestion, swelling and prolong her dying process. Frequent mouth care will help with feeling of thirst and if pt is awake, pt can eat or drink. Pt's often time lose the feeling of thirst or hunger as their bodies prepare for death. Jazmine and Karla agree with this. Dr. Olivier joined us. He recommended to stop antibiotics as well, as they will not likely provide any benefit to pt. Jazmine and Karla share with him that they want care to be focused on comfort. We agreed to SWS consult, meeting with hospice. Described hospice philosophy, goal of care, processes. Also described options for where care is received. Jazmine does not feel that the family can care for pt with hospice at home. She would like to explore LTC or hospice home. She also would like to meet with a different hospice than the one they have met with previously.  We will make pt comfort care. I will order medications and treatment that focus on pt comfort. SWS Corinne is present and continued meeting with daughters to further explore hospice and discharge facility. Will also consult  for support and prayer at pt bedside.    Patient has decision-making capacity Unreliable  Patient has a Health Care Agent named in a legal Advance Directive document dated 12/26/2014. See name(s) and contact information in Health Care Agent/Legal Guardian section below.  Name: Jazmine Diaz, Relationship: dtr  See contact information in ACP tab in Pt medical record.    Patient has a completed health care directive available in the chart (Y/N): Y  There is no POLST (Physician orders for life-sustaining treatment) form on file for this patient  Code Status: Do not resuscitate / Do not intubate     Findings & plan of care discussed with: Dr. Olivier, SWS Corinne, Chaplain David, bedside " nurse Sharp  Follow-up plan from palliative team: Will continue to follow this pt for symptom management and support for pt and family with decision-making.   Thank you for involving us in the patient's care.     Diamante PHIPPS, CNS  Pain Management and Palliative Care  [facility]  Pgr: [phone]    Time Spent on this Encounter   I spent 10:45-11:45 am in assessment of the patient and discussion with the family at bedside. Another 40 minutes in review of chart, documentation and discussion with the health care team.    Reason for Consult   Reason for consult: I was asked by Dr. Olivier to evaluate this patient for Goals of care.    Primary Care Physician   Abeba Bradford    Chief Complaint    Progressive confusion and agitation x 24 hours.    History is obtained from the electronic health record and patient's daughter    History of Present Illness   Josy Thomson is a 89 year old female who presents with 24 hour history of worsening confusion and agitation with fever at her TCU.     Pt was discharged from [facility] on 2/24 after 8 day hospitalization for generalized weakness, dehydration, diarrhea, encephalopathy and poor PO intake to TCU. At TCU she developed influenza and was started on tamiflu which was completed on 3/2.     Upon readmission to [facility] ED, work up significant for total wbc of 0.3, hgb 7.0 gm/Dl, platelets of 37K, sCR 2.67 with GFR 17 and BUN 86, N-BNP 70565, troponin <0.015. Pt head CT without contrast negative for acute pathology, positive for brain atrophy. Cxr with increased density in perihilar regions bilat and L lower lobe. CT abdomen/pelvis reviewed bibasilar lung consolidation suspicious for PNA. Pt was admitted for antibiotics and IVF. Despite treatment, pt displays progressive decrease in LOC, inability to eat, inability to participate in ADLs.    Pt with known MDS, diagnosed 10/2015, followed by Dr. Morrissey at [facility]. She has anemia,  thrombocytopenia and neutropenia and receives prbc transfusions and EPO, but no other treatments due to risk of toxicities, pt performance status and age. Poor prognosis per note by Dr. Gagnon 3/4/2018.    Past Medical History   I have reviewed this patient's medical history and updated it with pertinent information if needed.   Past Medical History:   Diagnosis Date     Anemia      Arthritis      History of blood transfusion      History of pneumonia      Hypertension      Macular degeneration      Tachycardia        Past Surgical History     Past Surgical History:   Procedure Laterality Date     BONE MARROW BIOPSY, BONE SPECIMEN, NEEDLE/TROCAR Right 10/12/2015    Procedure: BIOPSY BONE MARROW;  Surgeon: David Mercado MD;  Location: [facility] OR     ENT SURGERY       ORTHOPEDIC SURGERY  2006    right shoulder pinning     Prior to Admission Medications   Prior to Admission Medications   Prescriptions Last Dose Informant Patient Reported? Taking?   Acetaminophen (TYLENOL PO) 3/1/2018 at Unknown time  Yes Yes   Sig: Take 500 mg by mouth every 4 hours as needed for mild pain or fever   B Complex Vitamins (B COMPLEX 1 PO) 3/2/2018 at am  Yes Yes   Sig: Take 1 tablet by mouth daily    DARBEPOETIN KEVIN-POLYSORBATE IJ   Yes No   Sig: Inject 4 mLs as directed Every other week   HYDROmorphone HCl (DILAUDID PO)   Yes Yes   Sig: Take 2 mg by mouth nightly as needed (break-through pain at night)   HYDROmorphone HCl (DILAUDID PO) 3/2/2018 at 1800  Yes Yes   Sig: Take 2 mg by mouth 3 times daily   LevoFLOXacin (LEVAQUIN PO) 3/2/2018 at 1200  Yes Yes   Sig: Take 250 mg by mouth daily For 5 days, last dose on 3-5-18   Multiple Vitamins-Minerals (OCUVITE PO) 3/2/2018 at 2nd-pm  Yes Yes   Sig: Take 1 tablet by mouth 2 times daily   Oseltamivir Phosphate (TAMIFLU PO) 3/2/2018 at pm  Yes Yes   Sig: Take 30 mg by mouth 2 times daily For 5 days (last day 3-3-18)   albuterol (2.5 MG/3ML) 0.083% neb solution   Yes Yes   Sig: Take 1 vial  by nebulization every 6 hours as needed for shortness of breath / dyspnea or wheezing   albuterol (2.5 MG/3ML) 0.083% neb solution 3/2/2018 at 1800  Yes Yes   Sig: Take 1 vial by nebulization 3 times daily   calcium carb 1250 mg, 500 mg Douglas,/vitamin D 200 units (OSCAL WITH D) 500-200 MG-UNIT per tablet 3/2/2018 at am Other Yes Yes   Sig: Take 2 tablets by mouth daily    cyanocobalamin (VITAMIN B12) 1000 MCG/ML injection due 3-29-18 at Unknown time  Yes Yes   Sig: Inject 1 mL into the muscle every 30 days   deferoxamine   Yes No   Sig: Inject 2,000 mg into the vein Every 2 weeks With blood transfusions   metoprolol (TOPROL-XL) 100 MG 24 hr tablet 3/2/2018 at am Other Yes Yes   Sig: Take 100 mg by mouth daily.   omeprazole (PRILOSEC) 20 MG capsule 3/2/2018 at am Other Yes Yes   Sig: Take 40 mg by mouth daily    propylene glycol (SYSTANE BALANCE) 0.6 % SOLN ophthalmic solution 3/2/2018 at 1600  Yes Yes   Sig: Place 1 drop into both eyes 3 times daily   saccharomyces boulardii (FLORASTOR) 250 MG capsule 3/2/2018 at am  Yes Yes   Sig: Take 250 mg by mouth daily For 7 days (last day 3-8-18)   trimethoprim-polymyxin b (POLYTRIM) ophthalmic solution 3/2/2018 at 2000  Yes Yes   Sig: Place 1 drop Into the left eye 4 times daily For 7 days (llast day 3-5-18)   venlafaxine (EFFEXOR XR) 75 MG 24 hr capsule 3/2/2018 at am  Yes Yes   Sig: Take 75 mg by mouth daily.        Facility-Administered Medications: None     Allergies   No Known Allergies    Social History   I have updated and reviewed the following Social History Narrative:   Social History     Social History Narrative      Living situation: prior to rehab at [facility], pt was living in her home with services.  Family system: . 3 adult children. Supportive. Oldest son is in rehab. Middle daughter Jazmine is HCA and POA. Youngest daughter Karla is here from [place].   Functional status (needs help with ADLs or IADLs): Pt is totally dependent for  cares.  Employment/education: Pt was  and raised her children. 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  Use of community resources: [employer]. [employer].   Activities/interests: Sitting in the sun, being with family and friends.  History of substance use/abuse: Not assessed.  Mormonism affiliation: Mormon - does not go to formal Faith but is Mu-ism, believes in Combined Power, and welcome prayers to Gigi.   Involvement in domitila community: none  Impact of illness on patient: Pt prognosis is days to weeks.    Family History   I have reviewed this patient's family history and updated it with pertinent information if needed.   No family history on file.    Review of Systems /Palliative Symptom Review   GRIS due to pt LOC.    Physical Exam   Temp:  [97.2  F (36.2  C)-98.3  F (36.8  C)] 97.9  F (36.6  C)  Heart Rate:  [100-115] 115  Resp:  [20-26] 24  BP: (122-158)/(51-79) 132/61  SpO2:  [84 %-100 %] 95 %  131 lbs 11.2 oz  GEN:  Somulent - rouses when I attempt to lift up her arms, appears comfortable, NAD.  HEENT:  Normocephalic/atraumatic, no scleral icterus, no nasal discharge, mouth dry.  CV:  Tachy, S1, S2, S4;  +3 DP/PT pulses bilatererally; no edema BLE. +2 Edema BUEs.  RESP:  bilat RH with exp wheezes, and prolonged expiration.  Symmetric chest rise on inhalation noted.  Labored breathing. O2 at 2l/min NC.  ABD:  Rounded, soft, non-tender/non-distended.  +BS  EXT:  Edema & pulses as noted above.  CMS intact x 4.     M/S:   Bilateral arm and shoulder pain with any movement of her arms, knee pain from osteoarthrits - deferred extensive palpation.    SKIN:  Sl diaphoretic, no exanthems noted in the visualized areas.    NEURO: MOLINA. Spontaneous movement but does not follow commands  Psych:  Somulent affect.  Non-verbal. Rouses when her arms are moved due to pain.    Delirium Screen/CAM:  GRIS due to LOC.  Data   Results for orders placed or performed during the hospital  encounter of 03/02/18 (from the past 24 hour(s))   Vancomycin level   Result Value Ref Range    Vancomycin Level 9.6 mg/L   Lactic acid level STAT   Result Value Ref Range    Lactic Acid 1.3 0.7 - 2.0 mmol/L   CBC with platelets differential   Result Value Ref Range    WBC 0.8 (LL) 4.0 - 11.0 10e9/L    RBC Count 2.53 (L) 3.8 - 5.2 10e12/L    Hemoglobin 7.2 (L) 11.7 - 15.7 g/dL    Hematocrit 22.2 (L) 35.0 - 47.0 %    MCV 88 78 - 100 fl    MCH 28.5 26.5 - 33.0 pg    MCHC 32.4 31.5 - 36.5 g/dL    RDW 15.4 (H) 10.0 - 15.0 %    Platelet Count 16 (LL) 150 - 450 10e9/L    Diff Method Manual Differential     % Neutrophils 78.0 %    % Lymphocytes 18.0 %    % Monocytes 1.0 %    % Eosinophils 3.0 %    % Basophils 0.0 %    Absolute Neutrophil 0.6 (L) 1.6 - 8.3 10e9/L    Absolute Lymphocytes 0.1 (L) 0.8 - 5.3 10e9/L    Absolute Monocytes 0.0 0.0 - 1.3 10e9/L    Absolute Eosinophils 0.0 0.0 - 0.7 10e9/L    Absolute Basophils 0.0 0.0 - 0.2 10e9/L    Acanthocytes Moderate     Dohle Bodies Present     Toxic Granulation Present     Platelet Estimate       Automated count confirmed.  Giant platelets are present.   Basic metabolic panel   Result Value Ref Range    Sodium 148 (H) 133 - 144 mmol/L    Potassium 3.6 3.4 - 5.3 mmol/L    Chloride 118 (H) 94 - 109 mmol/L    Carbon Dioxide 23 20 - 32 mmol/L    Anion Gap 7 3 - 14 mmol/L    Glucose 120 (H) 70 - 99 mg/dL    Urea Nitrogen 88 (H) 7 - 30 mg/dL    Creatinine 1.86 (H) 0.52 - 1.04 mg/dL    GFR Estimate 25 (L) >60 mL/min/1.7m2    GFR Estimate If Black 31 (L) >60 mL/min/1.7m2    Calcium 8.6 8.5 - 10.1 mg/dL

## 2018-03-05 NOTE — PLAN OF CARE
Problem: Patient Care Overview  Goal: Plan of Care/Patient Progress Review  Outcome: No Change  [id number]: Pt resting comfortably. Confused, Disoriented to time, place, and situation. VSS on 2L O2. Pain controlled with scheduled po medication. NPO ex ice chips. Tele reads SR/ST. Has scheduled nebs. Coughing encouraged. Respiratory paged- about patient lungs sounding wet/congested, RT suctioned with minimal output. Patent calle with AUO. Turned and repositioned for comfort. Will continue to monitor.

## 2018-03-05 NOTE — PLAN OF CARE
Problem: Patient Care Overview  Goal: Plan of Care/Patient Progress Review  Outcome: Declining  Placed on comfort care this morning. Plan hospice meeting with the 2 daughters tomorrow. Dilaudid as ordered. Oral care. Repositioned.

## 2018-03-05 NOTE — PROGRESS NOTES
NUTRITION BRIEF NOTE    Positive nutrition risk screen - Unintentional weight loss of 10# or more in past 2 months    Per chart review, palliative care to see patient today, poor prognosis and remains NPO.  Plan includes comfort cares.    Will follow daily during IDT rounds and complete a full nutrition assessment if goals of care are restorative.      Jazz Goss RDN, LD, [facility]  Pager - 3rd floor/ICU: [phone]  Pager - All other floors: [phone]  Pager - Weekend/holiday: [phone]  Office: [phone]

## 2018-03-05 NOTE — PROGRESS NOTES
CM: Called [facility]. PT was still in TCU however there was a discussion about going comfort care. Will touch base with family to see if they plan to have pt return on Hospice take pt home

## 2018-03-06 NOTE — DISCHARGE SUMMARY
[facility]  Discharge Summary        Josy Thomson MRN# [id number]   YOB: 1927 Age: 89 year old     Date of Admission:  3/2/2018  Date of Discharge:  3/6/2018  Admitting Physician:  Jerica Olivier MD  Discharge Physician: Jerica Olivier MD  Discharging Service: Hospitalist     Primary Provider: Dr. Abeba Chance  Primary Care Physician Phone Number: [phone]         Discharge Diagnoses/Problem Oriented Hospital Course (Providers):    Josy Thomson was admitted on 3/2/2018 by Jerica Olivier MD and I would refer you to their history and physical.      Patient was seen and examined on the day of discharge    Hospital course:  1. Acute acute sepsis secondary to sepsis secondary to healthcare associated pneumonia/neutropenic fever   2. Acute infectious encephalopathy  3. Chronic pancytopenia secondary to myelodysplastic disorder  4. Acute kidney injury secondary to decreased intake/sepsis    Hospital course:  1. Healthcare associated pneumonia/neutropenic fever: Josy Thomson is a 89 year old female patient with past medical history of myelodysplastic disorder, anemia, hypertension, arthritis, recent influenza infection, was brought from transitional care unit for evaluation for change in mental status.  History was obtained from patient's daughters.  Patient was febrile and tachycardic.  Laboratory workup showed WBC 0.3, creatinine 3.4.  CT of the abdomen/pelvis showed bibasilar consolidation suspicious for pneumonia.  She was given IV vancomycin and cefepime.  She was admitted to [facility] for further management.  Respiratory status did improve but she did require some low-dose FiO2 at discharge.  She remains lethargic and has progressively failed to improve from a functional perspective.  A care conference was held with the family, specifically her 2 daughters with myself along with the palliative care team and they have decided on hospice at discharge.  Patient continues to decline and has a guarded to  poor prognosis given her significant neutropenia and pancytopenia.  Oncology was also consulted who recommended no further management of her MDS.  This, the patient was made comfort care and will be transitioned to long-term care with hospice support.  2. Acute infectious encephalopathy: Secondary #1.  Remains lethargic and not able to engage much in terms of conversation.  3. Pancytopenia secondary to myelodysplastic disorder: Again, guarded to poor short-term prognosis.  Hospice at discharge.  4. Acute kidney injury, prerenal: Sepsis may be playing a component.  Given decision to transition to comfort cares and hospice at discharge, IV fluids were discontinued.      Disposition: Discharged to [facility], [facility] Molina, with [facility].               Pending Results:        Unresulted Labs Ordered in the Past 30 Days of this Admission     Date and Time Order Name Status Description    3/2/2018 2221 Blood culture Preliminary     3/2/2018 2117 Blood culture Preliminary             Discharge Instructions and Follow-Up:      Follow-up Appointments     Follow Up and recommended labs and tests       Follow-up with [facility] team at discharge.                      Discharge Disposition:      Discharged to home         Discharge Medications:        Current Discharge Medication List      START taking these medications    Details   MEDICATION INSTRUCTION If care facility cannot accept or use ranges, facility is instructed to use lower end of dosing range    Associated Diagnoses: End of life care      HYDROmorphone, HIGH CONC, (DILAUDID) 10 mg/mL LIQD oral Place 0.2 mLs (2 mg) under the tongue every 4 hours And 0.2-0.4 ML (2-4 mg) q 2 hours prn breakthrough pain or dyspnea with respiratory rate greater than 20.  Qty: 30 mL, Refills: 0    Associated Diagnoses: End of life care      atropine 1 % ophthalmic solution Take 2-4 drops by mouth, place under tongue or place inside cheek every 2 hours  as needed for other (terminal respiratory secretions) Not for ophthalmic use.  Qty: 5 mL, Refills: 0    Associated Diagnoses: End of life care      LORazepam (ATIVAN) 2 MG/ML (HIGH CONC) solution Take 0.25 mLs (0.5 mg) by mouth, place under tongue or insert rectally every 3 hours as needed for anxiety (restlessness)  Qty: 30 mL, Refills: 0    Associated Diagnoses: End of life care      haloperidol (HALDOL) 2 MG/ML (HIGH CONC) solution Take 0.25-0.5 mLs (0.5-1 mg) by mouth, place under tongue or insert rectally every 6 hours as needed for agitation (nausea)  Qty: 30 mL, Refills: 0    Associated Diagnoses: End of life care      bisacodyl (DULCOLAX) 10 MG Suppository Unwrap and insert 1 suppository rectally twice daily as needed for constipation.  Qty: 4 suppository, Refills: 0    Associated Diagnoses: End of life care      acetaminophen (TYLENOL) 650 MG Suppository Place 1 suppository (650 mg) rectally every 4 hours as needed for fever or mild pain (Do not exceed 4000 mg total acetaminophen per day.) Unwrap prior to insertion.  Qty: 12 suppository, Refills: 0    Associated Diagnoses: End of life care      diclofenac (VOLTAREN) 1 % GEL topical gel Apply 2 g topically 4 times daily To upper arms and shoulders  Qty: 1 Tube, Refills: 0    Comments: Please relabel tube from 3rd floor and send with pt.  Associated Diagnoses: End of life care         CONTINUE these medications which have NOT CHANGED    Details   albuterol (2.5 MG/3ML) 0.083% neb solution Take 1 vial by nebulization every 6 hours as needed for shortness of breath / dyspnea or wheezing      propylene glycol (SYSTANE BALANCE) 0.6 % SOLN ophthalmic solution Place 1 drop into both eyes 3 times daily         STOP taking these medications       HYDROmorphone HCl (DILAUDID PO) Comments:   Reason for Stopping:         HYDROmorphone HCl (DILAUDID PO) Comments:   Reason for Stopping:         LevoFLOXacin (LEVAQUIN PO) Comments:   Reason for Stopping:          trimethoprim-polymyxin b (POLYTRIM) ophthalmic solution Comments:   Reason for Stopping:         saccharomyces boulardii (FLORASTOR) 250 MG capsule Comments:   Reason for Stopping:         Oseltamivir Phosphate (TAMIFLU PO) Comments:   Reason for Stopping:         Acetaminophen (TYLENOL PO) Comments:   Reason for Stopping:         Multiple Vitamins-Minerals (OCUVITE PO) Comments:   Reason for Stopping:         deferoxamine Comments:   Reason for Stopping:         cyanocobalamin (VITAMIN B12) 1000 MCG/ML injection Comments:   Reason for Stopping:         DARBEPOETIN KEVIN-POLYSORBATE IJ Comments:   Reason for Stopping:         B Complex Vitamins (B COMPLEX 1 PO) Comments:   Reason for Stopping:         venlafaxine (EFFEXOR XR) 75 MG 24 hr capsule Comments:   Reason for Stopping:         omeprazole (PRILOSEC) 20 MG capsule Comments:   Reason for Stopping:         metoprolol (TOPROL-XL) 100 MG 24 hr tablet Comments:   Reason for Stopping:         calcium carb 1250 mg, 500 mg [place],/vitamin D 200 units (OSCAL WITH D) 500-200 MG-UNIT per tablet Comments:   Reason for Stopping:                 Allergies:       No Known Allergies        Consultations This Hospital Stay:      Consultation during this admission received from oncology           Image Results From This Hospital Stay (For Non-EPIC Providers):        Results for orders placed or performed during the hospital encounter of 03/02/18   XR Chest Port 1 View    Narrative    PORTABLE CHEST ONE VIEW   3/2/2018  9:36 PM     HISTORY: Fever.    COMPARISON: 2/17/2018 chest x-ray.      Impression    IMPRESSION: Moderate stable cardiomegaly. Mildly prominent central  pulmonary vasculature. Increased density in the perihilar regions  bilaterally and left lower lobe. These opacities could be caused by  congestive heart failure. Cannot exclude pneumonia in the right  perihilar region or left lower lobe.    Advanced bilateral shoulder degenerative changes.    ANURADHA SERRANO MD   Head  CT w/o contrast    Narrative    CT SCAN OF THE HEAD WITHOUT CONTRAST   3/2/2018 10:38 PM     HISTORY: Agitation, acute altered mental status.      TECHNIQUE:  Axial images of the head and coronal reformations without  IV contrast material. Radiation dose for this scan was reduced using  automated exposure control, adjustment of the mA and/or kV according  to patient size, or iterative reconstruction technique.    COMPARISON: 2/16/2018.    FINDINGS: There is no evidence of intracranial hemorrhage, mass, acute  infarct or anomaly. There is generalized atrophy of the brain. There  is low attenuation in the white matter of the cerebral hemispheres  consistent with sequelae of small vessel ischemic disease.     The visualized portions of the sinuses and mastoids appear normal. The  bony calvarium and bones of the skull base appear intact. Bilateral  pseudophakia.      Impression    IMPRESSION:     1. No evidence of acute intracranial hemorrhage, mass, or herniation.  2. There is generalized atrophy of the brain. White matter changes are  present in the cerebral hemispheres that are consistent with small  vessel ischemic disease in this age patient.      BETHANY HUANG MD   Abd/pelvis CT no contrast - Stone Protocol    Narrative    CT ABDOMEN AND PELVIS WITHOUT CONTRAST   3/2/2018 10:37 PM     HISTORY: Abdominal pain, fever, altered mental status.    TECHNIQUE: No IV contrast material. Radiation dose for this scan was  reduced using automated exposure control, adjustment of the mA and/or  kV according to patient size, or iterative reconstruction technique.    COMPARISON: CT scan from 6/15/2012.    FINDINGS: Scans through the lung bases demonstrate bibasilar  consolidation suspicious for pneumonia. I suspect there is bilateral  hilar adenopathy as well.    The noncontrast appearance of the liver is normal. There appears to be  a peripherally calcified porcelain type gallbladder with variable  density within it likely sludge  or cholesterol gallstones. Polyps not  excluded. The low-density areas within the gallbladder are more  prominent than on the comparison study. Spleen is atrophic. Pancreas  is atrophic but otherwise unremarkable. No adrenal lesions. No kidney  stones or hydronephrosis. No contour deforming renal masses. There is  likely a left mid pole renal cortical cyst    No evidence of abdominal aortic aneurysm or retroperitoneal  adenopathy.    Scans through the pelvis demonstrate calcified uterine fibroid.  Bladder has a normal appearance. No evidence of diverticulitis or  appendicitis. No evidence of bowel obstruction. No free air or free  fluid.      Impression    IMPRESSION:  1. Bibasilar consolidation suspicious for pneumonia. There is likely  bilateral hilar adenopathy as well seen on the images that include a  portion of the lower chest.  2. There is a peripherally calcified porcelain type gallbladder with  mixed density material within it. Two low density round areas are more  prominent than on the comparison study and could represent cholesterol  gallstones. A polyp of some sort would be difficult to exclude. On  this study, there are two low-density areas in the gallbladder and  previously there was one.  3. No evidence of diverticulitis or appendicitis.    ANURADHA SERRANO MD   POC US ABDOMEN LIMITED    Impression    PROCEDURE NOTE --> Emergency Bedside Ultrasound    Procedure Name: Modified RUSH exam    Preformed by: Wilber Qureshi MD    Indication - Hypotension    Probe: low frequency curvilinear probe    Windows - Hepatorenal, Splenorenal, Suprapubic, Subxyphoid, Abdominal Aortic Views, IVC, bilateral lung windows    Findings - No intraperitoneal free fluid, No pericardial effusion, No Pneumothorax, No Abdominal aortic aneurysm, <18mm, >30% collapsibility IVC, grossly normal contractility,     Impression  -hypovolemia preserved contractility no sign of obstructive shock    Images saved on ultrasound internal hard  drive per protocol

## 2018-03-06 NOTE — PLAN OF CARE
Problem: Patient Care Overview  Goal: Plan of Care/Patient Progress Review  Outcome: No Change  Pt on comfort cares to be discharged to hospice care at [facility] this afternoon. Dilaudid prn and scheduled. Pt slept most of the shift. Oral care provided. Repositioned q2hrs.

## 2018-03-06 NOTE — PLAN OF CARE
Problem: Patient Care Overview  Goal: Plan of Care/Patient Progress Review  Outcome: Declining  Pain:  Pt resting comfortably.  She does call out when time to turn and reposition.  Scheduled oral dilaudid given per orders.  LOC: confused to time, place situation.  Awakens to voice.  Mobility:  Assist of 2 to turn and reposition q 2 hrs  Lungs: Shallow, diminished. 92% 3L  Tele: No tele  GI: Regular diet. Per palliative note, pt may eat or drink if awake and desiring food/water.  Pt did not want food this shift, but she did ask for water.  Small sips of water were given with a spoon.  Ice chips given too.  : calle catheter  IV: PIV saline locked  Other: Plan hospice meeting tomorrow with family.  Comfort cares. P.O. dilaudid as ordered. Oral care. Repositioned. Neutropenic precautions maintained.  Info re: precautions given to pt

## 2018-03-06 NOTE — PROGRESS NOTES
"[facility]  Palliative Care Progress Note  Text Page     Assessment & Plan   Josy Thomson is a 89 year old female who was admitted on 3/2/2018. I was asked to see the patient for goals of care.     Recommendations:  1. Pain - tylenol 500 mg PO or 650 mg SD q 4 hours prn mild pain or fever. Voltaren 1% gel to upper arms and shoulders QID. Hydromorphone 2 mg SL q 4 hours and 2-4 mg SL q 2 hours prn pain or dyspnea with respiratory rate greater than 20. Hydromorphone 0.3-0.5 mg IV q 2 hours prn pain if sublingual prn doses ineffective. Position for comfort. Pt has increased pain with lifting, moving of arms and shoulders.  2. Dyspnea-  See hydromorphone in #1. o2 at 2-4 l/min prn dyspnea. Fan at bedside. Lorazepam 0.5-1 mg SL q 3 hours prn. Atropine 1% 1-2 gtts q 1 hour prn secretions. Albuterol neb q 4 hours prn.  3. Delirium - Haldol 0.5-1 mg SL q 6 hours prn agitation. Nursing delirium interventions as appropriate.     Goal of Care: DNR DNI. No feeding tube. Comfort Care. Pt does not display medical decision-making capacity. Dtr Jazmine is HCA. Dtr Karla also present. They verbalize that pt would not want to have life prolonging care and would like cares that promote pt comfort. \"We don't want her to have artificial treatments, just allow the natural process and keep her comfortable\".  Appreciate SWS Corinne assistance to coordinate meeting with  hospice this am. Pt will discharge to [facility] with  hospice at 4:30pm today. POLST completed. Comfort meds ordered.  Appreciate chaplain Chisholm visits to pt.     Disease Process/es & Symptoms:  Josy Thomson is a 89 year old patient admitted with symptoms of progressive confusion and agitation. She has been treated for pneumonia, recent influenza, encephalopathy, MDS with anemia, neutropenia, and thrombocytopenia, acute kidney injury .       This is in the setting of MDS dx 10/2015 receiving bimonthly transfusions of prbcs, epo but no other therapy due to " "performance status, HTN, arthritis, recent influenza, CKD, chronic pain, osteoarthritis, depression, GIB, anemia due to vit B12 deficiency.  She has been hospitalized 2 times in the past 1 month for recurrent confusion, altered mental status . Patient has moved to inpatient care from TCU for higher level of care and needs help with at least one ADL. There is a documented unitentional weight loss of 7 pounds over the past 12 months.     The following symptoms are noted by patient's daughters  as concerning to her quality of life.  Chronic pain  Dyspnea  Confusion  Deconditioning and loss of independence     Psychosocial/Spiritual Needs:  Pt is Zoroastrian and Jain  Oriented to Spiritual Health and Social Work Services as part of Palliative Care team.  is following. Consultation placed for  to follow.     Decision-Making & Goals of Care:  Discussion/counseling today about goals of care/decisions:   3/6/2018  Met with Bob at pt's bedside. Introduced them to the hospice team this am. Will complete POLST.  3/5/2018  Met at pt's bedside with dtr Jazmine and dtr Karla. They share that pt has been a very independent, strong person and has been able to live in her home for over 56 years up to her TCU admission after increased falling. \"Those falls really take it out of a person\".   They note that pt verbalized to a  at one point that she misses being able to sit in the sun, and was resentful to not be independent in her living any more. She \" was angry at me [daughter] for bringing her to the hospital earlier this month . . . She felt betrayed\". Bob verbalized that they have had the chance to speak with Dr. Morrissey and her partner, and the hospitalist about pt's condition. They understand that pt had influenza, and now has PNA, PAXTON in addition to her MDS with dependence on blood transfusions, and progressive decline in her mental status despite treatment. Jazmine states \"Mom " "never wanted to have all of these artificial things done to her . . . We want her to be kept comfortable\". We discussed artificial hydration. Pt is congested at present, in PAXTON/CKD with increasing swelling in her hands. It is likely that additional IV fluids would increase her respiratory congestion, swelling and prolong her dying process. Frequent mouth care will help with feeling of thirst and if pt is awake, pt can eat or drink. Pt's often time lose the feeling of thirst or hunger as their bodies prepare for death. Jazmine and Karal agree with this. Dr. Olivier joined us. He recommended to stop antibiotics as well, as they will not likely provide any benefit to pt. Jazmine and Karla share with him that they want care to be focused on comfort. We agreed to SWS consult, meeting with hospice. Described hospice philosophy, goal of care, processes. Also described options for where care is received. Jazmine does not feel that the family can care for pt with hospice at home. She would like to explore LTC or hospice home. She also would like to meet with a different hospice than the one they have met with previously.  We will make pt comfort care. I will order medications and treatment that focus on pt comfort. SWS Corinne is present and continued meeting with daughters to further explore hospice and discharge facility. Will also consult  for support and prayer at pt bedside.     Patient has decision-making capacity Unreliable  Patient has a Health Care Agent named in a legal Advance Directive document dated 12/26/2014. See name(s) and contact information in Health Care Agent/Legal Guardian section below.  Name: Jazmine Joe, Relationship: dtr  See contact information in ACP tab in Pt medical record.     Patient has a completed health care directive available in the chart (Y/N): Y  There is no POLST (Physician orders for life-sustaining treatment) form on file for this patient  Code Status:                     Do not " "resuscitate / Do not intubate      Findings & plan of care discussed with: Dr. Olivier, MELISSA Cano, malik Chisholm, bedside nurses  Follow-up plan from palliative team: Will continue to follow this pt for symptom management and support for pt and family with decision-making.   Thank you for involving us in the patient's care.        Diamante WALESKAGraciela PHIPPS, CNS  Pain Management and Palliative Care  [facility]  Pgr: [phone]    Time Spent on this Encounter   I spent  20 minutes in assessment of the patient and discussion with the patient and family. Another 15 minutes in review of chart, documentation and discussion with the health care team.    Interval History   Reviewed chart. More alert, agitated this am. Interacting some with her daughters, but is confused and can't \"see\" them. Breathing slightly more labored, but less audible RH.     Review of Systems  /Palliative Symptom Review   GRIS due to pt LOC.    Physical Exam   Temp:  [96.8  F (36  C)] 96.8  F (36  C)  Pulse:  [111] 111  Heart Rate:  [111] 111  Resp:  [20] 20  BP: (132)/(78) 132/78  SpO2:  [92 %] 92 %  131 lbs 11.2 oz     GEN:  Drowsy, weak, GRIS orientation, appears to recognize dtrs, appears sl agitated.  HEENT:  Normocephalic/atraumatic, no scleral icterus, no nasal discharge, mouth dry.   CV:  Tachy, S1, S2, S4;  +3 DP/PT pulses bilatererally; no edema BLE. +1 Edema BUEs.  RESP:  bilat RA with exp wheezes, and prolonged expiration.  Symmetric chest rise on inhalation noted.  Labored breathing. O2 at 2l/min NC.  ABD:  Rounded, soft, non-tender/non-distended.  +BS  EXT:  Edema & pulses as noted above.  CMS intact x 4.     M/S:   Bilateral arm and shoulder pain with any movement of her arms, knee pain from osteoarthritis - deferred extensive palpation.    SKIN:  Dry to touch, no exanthems noted in the visualized areas.  Farmer draining concentrated keke urine.  NEURO: MOLINA. Spontaneous movement but does not follow commands  Psych:  Drowsy, sl " agitated affect.  Non-verbal. Keeps eyes closed unless asked to open them.    Medications       HYDROmorphone  2-4 mg Sublingual Q4H     diclofenac  2 g Topical 4x Daily     haloperidol lactate  2 mg Intravenous Once     Carboxymethylcellulose Sod PF  1 drop Both Eyes TID       Data   Results for orders placed or performed during the hospital encounter of 03/02/18 (from the past 24 hour(s))   Social Work IP Consult    Narrative    White, Corinne C, LORAINE     3/5/2018  1:03 PM  Care Transition Initial Assessment -   Reason For Consult: care coordination/care conference, community   resources, discharge planning  Met with: Patient and Family    Active Problems:    HCAP (healthcare-associated pneumonia)       DATA  Lives With: alone  Living Arrangements: house  Description of Support System: Supportive, Involved  Who is your support system?: Children, Facility resident(s)/Staff  Support Assessment: Lacks necessary supervision and assistance,   Lacks adequate physical care, Caregiver experiencing high stress,   Caregiver difficulty providing physical care/safety.   Identified issues/concerns regarding health management: Pt   admitted from TCU and now moving to comfort care with Hospice       Resources List: Skilled Nursing Facility, came from [facility],. Would   like to return if bed available    Hospice- set up meeting with Hospice for tomorrow @10 am         Transportation Available: will need stretcher transport   ASSESSMENT  Pt is not alert. Met with pt's daughters. They are not   comfortable with taking pt home for Hospice support. They are   willing to have pt return to NH level of care with support of   Hospice. They met with a provider at NH facility but not   comfortable with that program   Set up meeting for tomorrow with [facility]      PLAN  Financial costs for the patient includes $5000 deposit for   Placement with Hospice support  .  Patient given options and choices for discharge yes .  Will follow up with family  following Hospice meeting.  Will need   stretcher transport

## 2018-03-06 NOTE — PLAN OF CARE
Problem: Patient Care Overview  Goal: Plan of Care/Patient Progress Review  Discharge Planner OT   Patient plan for discharge: comfort care  Current status: Per chart review and discussion with nursing pt has transitioned to comfort care. No skilled OT needs.  Barriers to return to prior living situation: N/A  Recommendations for discharge: N/A  Rationale for recommendations: OT will sign off and complete order.        Entered by: Erickson Campbell 03/06/2018 1:31 PM

## 2018-03-06 NOTE — PROGRESS NOTES
Discharge Planner   Discharge Plans in progress: Family meeting with Hospice to sign consent   Barriers to discharge plan: none. Will need stretcher transport   Follow up plan: [facility] to ML:JOHANN PORTILLO stretcher set up for 1630       Entered by: Corinne C. White 03/06/2018 10:34 AM

## 2018-03-06 NOTE — CONSULTS
[facility] Consult    Writer and RN Meagan met with pts dtrs Jazmine and Karla in a private conference room for a hospice consult this morning.  Provided info about our philosophy of care, medicare benefit, and available services.  Pt will discharge to [facility] this afternoon, consents have been signed so that she will be on our service when she arrives.  Writer ordered liquid O2 to be delivered to the facility and all parties understand our team will visit tomorrow morning to complete the admission process.  Coordinated care with RICCARDO Emanuel and palliative provider Diamante for discharge planning.    Thank you for this referral.    NAVIN Tee, LICSW  [phone]

## 2018-03-06 NOTE — PROGRESS NOTES
16:40 Pt d/c to [facility] (on hospice) at this time.  Transported via stretcher with [facility]. [employer]agency transfer forms sent and discharge medications along.

## 2018-03-06 NOTE — PLAN OF CARE
Problem: Patient Care Overview  Goal: Plan of Care/Patient Progress Review  Outcome: No Change  Pt on comfort cares. Repositioned. Pt sleeping through shift.  Oral care done. PIV SL. Continue with POC.

## 2018-03-06 NOTE — PROGRESS NOTES
Oncology/Hematology Follow Up Note:    Assessment and Plan:    Josy Thomson is a 89 year old female who was admitted on 3/2/2018.           Myelodysplasia  -followed by me.  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     PLAN:  I d/w daughter via phone and had a long chat with her, about goals of care.  - She is very open, to palliative care.  - She wants comfort care for her mom.  - Noted change in plan.      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  -continue desferal      ID  -healthcare associated pneumonia  -agree with broad spectrum antibiotics      Renal Failure  -on IV fluids  -appreciate hospitalist care      Encephalopathy  -monitor with supportive care      Liver Function Test Elevations  -iron over load and infection  may be contributing  -CT abdomen noted  -monitor              Code Status: DNR/DNI and comfort cares  Subjective:    Asleep,did not arouse      Labs:  All labs reviewed    CBC  Recent Labs  Lab 03/05/18  0623 03/04/18  0652 03/03/18  1001   WBC 0.8* 0.6* 0.5*   HGB 7.2* 7.7* 6.6*   MCV 88 86 88   PLT 16* 18* 31*       CMP  Recent Labs  Lab 03/05/18  0623 03/04/18  0652 03/03/18  0707 03/02/18  2114 03/02/18  2101   * 145* 138 136 136   POTASSIUM 3.6 3.6 4.0 4.5 4.4   CHLORIDE 118* 113* 106 99 101   CO2 23 23 25  --  26   ANIONGAP 7 9 7 10 9   * 133* 103* 75 74    BUN 88* 86* 87* 75* 86*   CR 1.86* 2.03* 2.35*  --  2.67*   GFRESTIMATED 25* 23* 19* 13* 17*   GFRESTBLACK 31* 28* 24* 15* 20*   TYREE 8.6 8.2* 8.3*  --  8.7   PROTTOTAL  --   --   --   --  5.8*   ALBUMIN  --   --   --   --  1.7*   BILITOTAL  --   --   --   --  1.0   ALKPHOS  --   --   --   --  152*   AST  --   --   --   --  258*   ALT  --   --   --   --  349*       INRNo lab results found in last 7 days.    Blood Culture  Recent Labs  Lab 03/02/18  2350 03/02/18  2218 03/02/18  2127   CULT <1000 colonies/mLurogenital carolina No growth after 3 days No growth after 3 days         Katelyn Morrissey MD  [facility]  3/6/2018 9:35 AM

## 2018-03-07 NOTE — PROGRESS NOTES
Handoff to [facility] completed via in-basket for Dr. Abeba Bradford--they are to call w/any ?.